# Patient Record
Sex: MALE | Race: WHITE
[De-identification: names, ages, dates, MRNs, and addresses within clinical notes are randomized per-mention and may not be internally consistent; named-entity substitution may affect disease eponyms.]

---

## 2017-09-10 ENCOUNTER — HOSPITAL ENCOUNTER (EMERGENCY)
Dept: HOSPITAL 45 - C.EDB | Age: 67
Discharge: HOME | End: 2017-09-10
Payer: COMMERCIAL

## 2017-09-10 VITALS
BODY MASS INDEX: 26.73 KG/M2 | HEIGHT: 70.98 IN | WEIGHT: 190.92 LBS | WEIGHT: 190.92 LBS | BODY MASS INDEX: 26.73 KG/M2 | HEIGHT: 70.98 IN

## 2017-09-10 VITALS — HEART RATE: 65 BPM | OXYGEN SATURATION: 97 % | SYSTOLIC BLOOD PRESSURE: 134 MMHG | DIASTOLIC BLOOD PRESSURE: 87 MMHG

## 2017-09-10 VITALS — TEMPERATURE: 97.88 F

## 2017-09-10 DIAGNOSIS — R10.9: ICD-10-CM

## 2017-09-10 DIAGNOSIS — Z79.82: ICD-10-CM

## 2017-09-10 DIAGNOSIS — N23: Primary | ICD-10-CM

## 2017-09-10 DIAGNOSIS — Z82.49: ICD-10-CM

## 2017-09-10 LAB
ALBUMIN/GLOB SERPL: 1.4 {RATIO} (ref 0.9–2)
ALP SERPL-CCNC: 75 U/L (ref 45–117)
ALT SERPL-CCNC: 28 U/L (ref 12–78)
ANION GAP SERPL CALC-SCNC: 11 MMOL/L (ref 3–11)
APPEARANCE UR: CLEAR
AST SERPL-CCNC: 20 U/L (ref 15–37)
BILIRUB UR-MCNC: (no result) MG/DL
BUN SERPL-MCNC: 15 MG/DL (ref 7–18)
BUN/CREAT SERPL: 13.3 (ref 10–20)
CALCIUM SERPL-MCNC: 9.8 MG/DL (ref 8.5–10.1)
CHLORIDE SERPL-SCNC: 105 MMOL/L (ref 98–107)
CO2 SERPL-SCNC: 23 MMOL/L (ref 21–32)
COLOR UR: YELLOW
CREAT CL PREDICTED SERPL C-G-VRATE: 67.5 ML/MIN
CREAT SERPL-MCNC: 1.1 MG/DL (ref 0.6–1.4)
EOSINOPHIL NFR BLD AUTO: 205 K/UL (ref 130–400)
GLOBULIN SER-MCNC: 3.1 GM/DL (ref 2.5–4)
GLUCOSE SERPL-MCNC: 161 MG/DL (ref 70–99)
HCT VFR BLD CALC: 45.2 % (ref 42–52)
MANUAL MICROSCOPIC REQUIRED?: NO
MCH RBC QN AUTO: 32.3 PG (ref 25–34)
MCHC RBC AUTO-ENTMCNC: 36.3 G/DL (ref 32–36)
MCV RBC AUTO: 89 FL (ref 80–100)
NITRITE UR QL STRIP: (no result)
PH UR STRIP: 7.5 [PH] (ref 4.5–7.5)
PMV BLD AUTO: 10.6 FL (ref 7.4–10.4)
POTASSIUM SERPL-SCNC: 3.9 MMOL/L (ref 3.5–5.1)
RBC # BLD AUTO: 5.08 M/UL (ref 4.7–6.1)
REVIEW REQ?: NO
SODIUM SERPL-SCNC: 139 MMOL/L (ref 136–145)
SP GR UR STRIP: 1.02 (ref 1–1.03)
URINE BILL WITH OR WITHOUT MIC: (no result)
URINE EPITHELIAL CELL AUTO: (no result) /LPF (ref 0–5)
UROBILINOGEN UR-MCNC: (no result) MG/DL
WBC # BLD AUTO: 14.5 K/UL (ref 4.8–10.8)
ZZUR CULT IF INDIC CLEAN CATCH: NO

## 2017-09-10 NOTE — DIAGNOSTIC IMAGING REPORT
ABD/PELVIS WITHOUT FOR STONE



CT DOSE: 966.87 mGycm



HISTORY: Flank pain  EVALUATE FLANK PAIN/HEMATURIA



TECHNIQUE: Multiaxial CT images of the abdomen and pelvis were performed without

the use of intravenous and oral contrast according to the standard department

stone protocol.  A dose lowering technique was utilized adhering to the

principles of ALARA.



COMPARISON STUDY: None.



FINDINGS: Lung bases are clear. Liver spleen and pancreas are unremarkable in

overall morphology. The adrenal glands are unremarkable.



Left kidney is negative for calcification or hydronephrosis.



There are findings of mild right renal hydronephrosis. There is a 6 x 4 mm

obstructing calculus at the right ureteropelvic junction. Distal aspects of the

ureters show no evidence for distention. Exophytic nodule from the mid right

kidney measuring 2.6 x 2.0 cm. Hounsfield units are indeterminate with this

potentially representing renal lesion versus hyperdense cyst.



The bowel pattern is nonobstructive. The appendix is normal. There are several

scattered colonic diverticuli with no evidence of diverticulitis. Prostate is

enlarged.



IMPRESSION:  



1. 6 x 4 mm obstructing calculus right ureteropelvic junction.

2. Mild/moderate right renal hydronephrosis.

3. Exophytic nodule mid right kidney measuring 2.6 x 2.0 cm. Renal ultrasound on

a routine basis is recommended to exclude the possibility of a renal mass versus

hyperdense cyst.

4. Scattered colonic diverticulosis 









The above report was generated using voice recognition software.  It may contain

grammatical, syntax or spelling errors.







Electronically signed by:  Derrick Gomez M.D.

9/10/2017 3:07 PM



Dictated Date/Time:  9/10/2017 3:02 PM

## 2017-09-10 NOTE — EMERGENCY ROOM VISIT NOTE
History


Report prepared by Deana:  Krystle Roberts


Under the Supervision of:  Dr. Tito Lomas M.D.


First contact with patient:  14:21


Chief Complaint:  FLANK PAIN


Stated Complaint:  EXTREME PAIN IN RIGHT SIDE, KIDNEY AREA, NAUSEA





History of Present Illness


The patient is a 67 year old male who presents to the Emergency Room with 

complaints of worsening right flank pain starting four hours ago. The patient 

states that the pain came on gradually. He states he noticed it when he went to 

[facility]. He reports that it worsened when at home watching the football game. He 

states that he couldn't stand the pain anymore so he decided to come into the 

hospital. The patient currently rates his pain as a 9/10 in severity. He notes 

that the pain stays in his back and does not radiate anywhere. He notes that he 

felt fine yesterday. The patient complains of nausea and feeling tingly. The 

patient denies pain in his testicles, burning with urination, frequent urination

, fever, and trauma. His wife notes that he is scheduled for a prostate MRI due 

to an increased PSA.





Patient arrived with previous records. They show a previous finding of a 6 mm 

stone within the right kidney.





   Source of History:  patient, spouse/significant other


   Onset:  four hours ago


   Position:  other (right flank)


   Symptom Intensity:  9/10


   Quality:  tingling


   Timing:  worsening


   Associated Symptoms:  + nausea, No fevers, No urinary symptoms


Note:


The patient denies pain in his testicles and trauma.





Review of Systems


See HPI for pertinent positives & negatives. A total of 10 systems reviewed and 

were otherwise negative.





Family History





FH: CHF (congestive heart failure)


  SISTER


FH: HTN (hypertension)


  BROTHER


  SISTER


No significant history


  FATHER


  MOTHER





Social History


Smoking Status:  Never Smoker


Marital Status:  


Housing Status:  lives with family


Occupation Status:  retired





Current/Historical Medications


Scheduled


Alfuzosin Hcl (Uroxatral), 10 MG PO DAILY


Aspirin (Aspirin Chewable), 81 MG PO DAILY


Atorvastatin (Atorvastatin Calcium), 10 MG PO DAILY


Levothyroxine Sodium (Levothyroxine Sodium), 125 MCG PO QAM


Multivitamins/Minerals (Mvi With Minerals), 1 TAB PO DAILY


Ondasetron Odt (Zofran Odt), 4 MG SL Q6H





Scheduled PRN


Oxycodone Ir (Roxicodone Ir), 1-2 TAB PO Q4H PRN for Pain





Allergies


Coded Allergies:  


     Tamsulosin (Unverified  Allergy, Unknown, ., 9/10/17)





Physical Exam


Vital Signs











  Date Time  Temp Pulse Resp B/P (MAP) Pulse Ox O2 Delivery O2 Flow Rate FiO2


 


9/10/17 16:14  65 18 134/87 97 Room Air  


 


9/10/17 14:38  51      


 


9/10/17 14:18 36.6 60 18 179/105 99 Room Air  











Physical Exam


GENERAL: Patient is in moderate distress secondary to pain.


HEENT: No acute trauma, normocephalic atraumatic, mucous membranes moist, no 

nasal congestion, no scleral icterus.


NECK: No stridor, no adenopathy, no meningismus, trachea is midline.


LUNGS: Clear to auscultation bilaterally, no wheeze, no rhonchi, breath sounds 

equal.


HEART: Without murmurs gallops or rubs, regular rate and rhythm.


ABDOMEN: Soft, nontender, bowel sounds positive, no hernias, no peritonitis.


BACK: Right flank discomfort with percussion.


EXTREMITIES: No cyanosis or edema, full range of motion of all the joints 

without pain or difficulty, no signs for acute trauma.


NEUROLOGIC: Oriented x 3, no acute motor or sensory deficits, no focal weakness.


SKIN: No rash, no jaundice, no diaphoresis.





Medical Decision & Procedures


ER Provider


Diagnostic Interpretation:


Radiology results as stated below per my review and radiologist interpretation:





ABD/PELVIS WITHOUT FOR STONE





CT DOSE: 966.87 mGycm





HISTORY: Flank pain  EVALUATE FLANK PAIN/HEMATURIA





TECHNIQUE: Multiaxial CT images of the abdomen and pelvis were performed without


the use of intravenous and oral contrast according to the standard department


stone protocol.  A dose lowering technique was utilized adhering to the


principles of ALARA.





COMPARISON STUDY: None.





FINDINGS: Lung bases are clear. Liver spleen and pancreas are unremarkable in


overall morphology. The adrenal glands are unremarkable.





Left kidney is negative for calcification or hydronephrosis.





There are findings of mild right renal hydronephrosis. There is a 6 x 4 mm


obstructing calculus at the right ureteropelvic junction. Distal aspects of the


ureters show no evidence for distention. Exophytic nodule from the mid right


kidney measuring 2.6 x 2.0 cm. Hounsfield units are indeterminate with this


potentially representing renal lesion versus hyperdense cyst.





The bowel pattern is nonobstructive. The appendix is normal. There are several


scattered colonic diverticuli with no evidence of diverticulitis. Prostate is


enlarged.





IMPRESSION:  





1. 6 x 4 mm obstructing calculus right ureteropelvic junction.


2. Mild/moderate right renal hydronephrosis.


3. Exophytic nodule mid right kidney measuring 2.6 x 2.0 cm. Renal ultrasound on


a routine basis is recommended to exclude the possibility of a renal mass versus


hyperdense cyst.


4. Scattered colonic diverticulosis 














The above report was generated using voice recognition software.  It may contain


grammatical, syntax or spelling errors.











Electronically signed by:  Derrick Gomez M.D.


9/10/2017 3:07 PM





Dictated Date/Time:  9/10/2017 3:02 PM





Laboratory Results


9/10/17 14:40








9/10/17 14:40

















Test


  9/10/17


14:40 9/10/17


15:34


 


Red Blood Count


  5.08 M/uL


(4.7-6.1) 


 


 


Mean Corpuscular Volume


  89.0 fL


() 


 


 


Mean Corpuscular Hemoglobin


  32.3 pg


(25-34) 


 


 


Mean Corpuscular Hemoglobin


Concent 36.3 g/dl


(32-36) 


 


 


RDW Standard Deviation


  40.8 fL


(36.4-46.3) 


 


 


RDW Coefficient of Variation


  12.7 %


(11.5-14.5) 


 


 


Mean Platelet Volume


  10.6 fL


(7.4-10.4) 


 


 


Anion Gap


  11.0 mmol/L


(3-11) 


 


 


Est Creatinine Clear Calc


Drug Dose 67.5 ml/min 


  


 


 


Estimated GFR (


American) 79.0 


  


 


 


Estimated GFR (Non-


American 68.1 


  


 


 


BUN/Creatinine Ratio 13.3 (10-20)  


 


Calcium Level


  9.8 mg/dl


(8.5-10.1) 


 


 


Total Bilirubin


  0.8 mg/dl


(0.2-1) 


 


 


Aspartate Amino Transf


(AST/SGOT) 20 U/L (15-37) 


  


 


 


Alanine Aminotransferase


(ALT/SGPT) 28 U/L (12-78) 


  


 


 


Alkaline Phosphatase


  75 U/L


() 


 


 


Total Protein


  7.3 gm/dl


(6.4-8.2) 


 


 


Albumin


  4.2 gm/dl


(3.4-5.0) 


 


 


Globulin


  3.1 gm/dl


(2.5-4.0) 


 


 


Albumin/Globulin Ratio 1.4 (0.9-2)  


 


Lipase


  218 U/L


() 


 


 


Urine Color  YELLOW 


 


Urine Appearance  CLEAR (CLEAR) 


 


Urine pH  7.5 (4.5-7.5) 


 


Urine Specific Gravity


  


  1.018


(1.000-1.030)


 


Urine Protein  NEG (NEG) 


 


Urine Glucose (UA)  NEG (NEG) 


 


Urine Ketones  1+ (NEG) 


 


Urine Occult Blood  2+ (NEG) 


 


Urine Nitrite  NEG (NEG) 


 


Urine Bilirubin  NEG (NEG) 


 


Urine Urobilinogen  NEG (NEG) 


 


Urine Leukocyte Esterase  NEG (NEG) 


 


Urine WBC (Auto)  1-5 /hpf (0-5) 


 


Urine RBC (Auto)  >30 /hpf (0-4) 


 


Urine Hyaline Casts (Auto)  0 /lpf (0-5) 


 


Urine Epithelial Cells (Auto)


  


  20-30 /lpf


(0-5)


 


Urine Bacteria (Auto)  NEG (NEG) 





Laboratory results reviewed by me.





Medications Administered











 Medications


  (Trade)  Dose


 Ordered  Sig/Mera


 Route  Start Time


 Stop Time Status Last Admin


Dose Admin


 


 Ondansetron HCl


  (Zofran Inj)  4 mg  NOW  STAT


 IV  9/10/17 14:25


 9/10/17 14:27 DC 9/10/17 14:41


4 MG


 


 Sodium Chloride  1,000 ml @ 


 999 mls/hr  Q1H1M STAT


 IV  9/10/17 14:25


 9/10/17 15:25 DC 9/10/17 14:41


999 MLS/HR


 


 Morphine Sulfate


  (MoRPHine


 SULFATE INJ)  4 mg  Q15M  PRN


 IV  9/10/17 14:30


 9/24/17 14:29  9/10/17 14:42


4 MG


 


 Ketorolac


 Tromethamine


  (Toradol Inj)  30 mg  NOW  STAT


 IV  9/10/17 14:25


 9/10/17 14:27 DC 9/10/17 14:41


30 MG


 


 Alfuzosin HCl


  (Uroxatral Tab)  10 mg  NOW  STAT


 PO  9/10/17 15:53


 9/10/17 15:54 DC 9/10/17 16:12


10 MG


 


 Oxycodone HCl


  (Roxicodone


 Immediate Rel 5MG


 Home Pack)  1 homepack  UD  ONCE


 PO  9/10/17 16:45


 9/10/17 16:46 DC 9/10/17 16:49


1 HOMEPACK


 


 Ondansetron HCl


  (ZOFRAN ODT 4MG


 Home Pack)  1 homepack  UD  ONCE


 PO  9/10/17 16:45


 9/10/17 16:46 DC 9/10/17 16:49


1 HOMEPACK











ED Course


1422: The patient was evaluated in room B11A. A complete history and physical 

exam was performed.





1425: Ordered Toradol Inj 30 mg IV, NSS 1000 ml @ 999 mls/hr IV, Zofran Inj 4 

mg IV.





1430: Ordered Morphine Sulfate 4 mg PRN IV pain.





1553: Ordered Alfuzosin HCl 10 mg PO.





1554: I reevaluated the patient and he is doing okay.





1618: Reevaluated the patient. Discussed results and discharge instructions: he 

verbalized understanding and agreement. The patient is ready for discharge.





1645: Ordered Ondansetron HCl 1 homepack PO, Oxycodone HCl 1 homepack PO.





Medical Decision


Differential diagnoses include renal colic, UTI, renal hematoma, dehydration, 

electrolyte imbalance, renal failure.





There is a mild leukocytosis, likely consistent with his pain.  No anemia.  No 

significant electrolyte abnormality, kidney failure or hepatitis.  Abdominal 

and pelvis CT shows a 6 mm right ureteral stone with hydronephrosis.  A nodule 

was seen on the right kidney for which follow-up was suggested.  Urinalysis 

shows hematuria, no infection.





The patient received IV saline, IV Toradol, IV morphine and IV Zofran, he is 

more comfortable.  He received oral Uroxatral.





The patient is stable, he is being discharged on Motrin, oxycodone, Uroxatral, 

Zofran.  He will strain all his urine for the stone.  He will return here for 

fever, vomiting or uncontrolled pain.  He will talk with his urologist tomorrow 

about follow-up.





[place] Drug Monitoring Program


Search Results:  no issues identified





Medication Reconcilliation


Current Medication List:  was personally reviewed by me





Blood Pressure Screening


Patient's blood pressure:  Elevated blood pressure


Blood pressure disposition:  Elevated BP felt to be situational





Impression





 Primary Impression:  


 Renal colic


 Additional Impression:  


 Right flank pain





Scribe Attestation


The scribe's documentation has been prepared under my direction and personally 

reviewed by me in its entirety. I confirm that the note above accurately 

reflects all work, treatment, procedures, and medical decision making performed 

by me.





Departure Information


Dispostion


Home / Self-Care





Prescriptions





Alfuzosin Hcl (Uroxatral) 10 Mg Tab


10 MG PO DAILY, #10 TAB


   Prov: Tito Lomas M.D.         9/10/17 


Ondasetron Odt (ZOFRAN ODT) 4 Mg Tab


4 MG SL Q6H for Nausea, #10 TAB


   Prov: Tito Lomas M.D.         9/10/17 


Oxycodone Ir (Roxicodone Ir) 5 Mg Tab


1-2 TAB PO Q4H Y for Pain, #10 TAB


   Prov: Tito Lomas M.D.         9/10/17





Referrals


No Doctor, Assigned (PCP)





Forms


HOME CARE DOCUMENTATION FORM,                                                 

               IMPORTANT VISIT INFORMATION





Patient Instructions


My [facility]





Additional Instructions





strain all the urine for the stone


stay hydrated


motrin for pain


oxy ir for severe pain, 1-2 tab as needed every 4 hours


stool softners to prevent constipation


Uroxatral to try and help pass the stone


zofran 1 tab every 6 hours for nausea


call and set up appt with your urologist tomorrow





return for worsening symptoms, fever, vomiting or uncontrolled pain





Problem Qualifiers

## 2017-09-12 ENCOUNTER — HOSPITAL ENCOUNTER (INPATIENT)
Dept: HOSPITAL 45 - C.EDB | Age: 67
LOS: 1 days | Discharge: HOME | DRG: 694 | End: 2017-09-13
Attending: FAMILY MEDICINE | Admitting: FAMILY MEDICINE
Payer: COMMERCIAL

## 2017-09-12 VITALS
HEIGHT: 70 IN | WEIGHT: 193.35 LBS | HEIGHT: 70 IN | BODY MASS INDEX: 27.68 KG/M2 | BODY MASS INDEX: 27.68 KG/M2 | WEIGHT: 193.35 LBS

## 2017-09-12 DIAGNOSIS — N40.0: ICD-10-CM

## 2017-09-12 DIAGNOSIS — K59.03: ICD-10-CM

## 2017-09-12 DIAGNOSIS — N20.1: Primary | ICD-10-CM

## 2017-09-12 DIAGNOSIS — Z82.49: ICD-10-CM

## 2017-09-12 DIAGNOSIS — I10: ICD-10-CM

## 2017-09-12 DIAGNOSIS — N23: ICD-10-CM

## 2017-09-12 DIAGNOSIS — Z79.82: ICD-10-CM

## 2017-09-12 DIAGNOSIS — Z96.653: ICD-10-CM

## 2017-09-12 DIAGNOSIS — E03.9: ICD-10-CM

## 2017-09-12 DIAGNOSIS — I73.9: ICD-10-CM

## 2017-09-12 DIAGNOSIS — E78.5: ICD-10-CM

## 2017-09-12 LAB
ALP SERPL-CCNC: 67 U/L (ref 45–117)
ALT SERPL-CCNC: 20 U/L (ref 12–78)
ANION GAP SERPL CALC-SCNC: 7 MMOL/L (ref 3–11)
APPEARANCE UR: (no result)
AST SERPL-CCNC: 15 U/L (ref 15–37)
BASOPHILS # BLD: 0.01 K/UL (ref 0–0.2)
BASOPHILS NFR BLD: 0.1 %
BILIRUB UR-MCNC: (no result) MG/DL
BUN SERPL-MCNC: 17 MG/DL (ref 7–18)
BUN/CREAT SERPL: 13.3 (ref 10–20)
CALCIUM SERPL-MCNC: 8.6 MG/DL (ref 8.5–10.1)
CHLORIDE SERPL-SCNC: 103 MMOL/L (ref 98–107)
CO2 SERPL-SCNC: 27 MMOL/L (ref 21–32)
COLOR UR: YELLOW
COMPLETE: YES
CREAT CL PREDICTED SERPL C-G-VRATE: 59.8 ML/MIN
CREAT SERPL-MCNC: 1.3 MG/DL (ref 0.6–1.4)
EOSINOPHIL NFR BLD AUTO: 176 K/UL (ref 130–400)
GLUCOSE SERPL-MCNC: 106 MG/DL (ref 70–99)
HCT VFR BLD CALC: 43.2 % (ref 42–52)
IG%: 0.6 %
IMM GRANULOCYTES NFR BLD AUTO: 8.2 %
LYMPHOCYTES # BLD: 1.01 K/UL (ref 1.2–3.4)
MANUAL MICROSCOPIC REQUIRED?: NO
MCH RBC QN AUTO: 31.3 PG (ref 25–34)
MCHC RBC AUTO-ENTMCNC: 34 G/DL (ref 32–36)
MCV RBC AUTO: 91.9 FL (ref 80–100)
MONOCYTES NFR BLD: 8.9 %
NEUTROPHILS # BLD AUTO: 0.1 %
NEUTROPHILS NFR BLD AUTO: 82.1 %
NITRITE UR QL STRIP: (no result)
PH UR STRIP: 5 [PH] (ref 4.5–7.5)
PMV BLD AUTO: 10.6 FL (ref 7.4–10.4)
POTASSIUM SERPL-SCNC: 4.1 MMOL/L (ref 3.5–5.1)
RBC # BLD AUTO: 4.7 M/UL (ref 4.7–6.1)
REVIEW REQ?: NO
SODIUM SERPL-SCNC: 137 MMOL/L (ref 136–145)
SP GR UR STRIP: 1.02 (ref 1–1.03)
URINE BILL WITH OR WITHOUT MIC: (no result)
URINE EPITHELIAL CELL AUTO: (no result) /LPF (ref 0–5)
UROBILINOGEN UR-MCNC: (no result) MG/DL
WBC # BLD AUTO: 12.31 K/UL (ref 4.8–10.8)

## 2017-09-12 NOTE — DIAGNOSTIC IMAGING REPORT
KUB



CLINICAL HISTORY: Right flank pain. History of kidney stones.    



COMPARISON STUDY:  CT of the abdomen and pelvis September 10, 2017. 



FINDINGS: Pelvic calcifications represent phleboliths, as shown on prior

abdominal CT. A 6 mm right ureteropelvic junction calculus is unchanged in

position since CT of September 10, 2017. No additional urinary calculi are

identified. There is no evidence for a bowel obstruction.



IMPRESSION:  No change in position of the 6 mm right ureteropelvic junction

calculus. 







Electronically signed by:  Akshat Rose M.D.

9/12/2017 8:25 PM



Dictated Date/Time:  9/12/2017 8:22 PM

## 2017-09-12 NOTE — EMERGENCY ROOM VISIT NOTE
History


Report prepared by Deana:  Krystle Roberts


Under the Supervision of:  Dr. Lauri Arroyo M.D.


First contact with patient:  19:27


Chief Complaint:  KIDNEY STONE


Stated Complaint:  PAIN FROM KIDNEY STONE





History of Present Illness


The patient is a 67 year old male who presents to the Emergency Room with 

complaints of a worsening right kidney stone starting a few days ago. The 

patient states that he was here two days ago and they found a 6 mm kidney 

stone. He states he has an appointment tomorrow with urology, but reports that 

he is struggling with pain control. The patient states that he cannot get 

comfortable and the Oxycodone hasn't been helping. He complains of being 

constipated from the Oxycodone. The patient currently rates his pain as a 7/10 

in severity. The patient denies taking Ibuprofen.





   Source of History:  patient


   Onset:  a few days ago


   Position:  other (right flank)


   Symptom Intensity:  7/10


   Timing:  worsening


Note:


The patient complains of constipation. The patient denies taking Ibuprofen.





Review of Systems


See HPI for pertinent positives & negatives. A total of 10 systems reviewed and 

were otherwise negative.





Past Medical & Surgical


Medical Problems:


(1) HTN (hypertension)


(2) Renal colic


Surgical Problems:


(1) History of total bilateral knee replacement








Family History





FH: CHF (congestive heart failure)


  SISTER


FH: HTN (hypertension)


  BROTHER


  SISTER


No significant history


  FATHER


  MOTHER





Social History


Smoking Status:  Never Smoker


Marital Status:  


Housing Status:  lives with family


Occupation Status:  retired





Current/Historical Medications


Scheduled


Alfuzosin Hcl (Uroxatral), 10 MG PO DAILY


Aspirin (Aspirin Chewable), 81 MG PO DAILY


Atorvastatin (Atorvastatin Calcium), 10 MG PO DAILY


Docusate Sodium (Stool Softener), 100 MG PO AS DIRECTED


Levothyroxine Sodium (Levothyroxine Sodium), 125 MCG PO QAM


Multivitamins/Minerals (Mvi With Minerals), 1 TAB PO DAILY


Ondasetron Odt (Zofran Odt), 4 MG SL Q6H





Scheduled PRN


Oxycodone Ir (Roxicodone Ir), 1-2 TAB PO Q4H PRN for Pain





Allergies


Coded Allergies:  


     Tamsulosin (Unverified  Allergy, Unknown, ., 9/12/17)





Physical Exam


Vital Signs











  Date Time  Temp Pulse Resp B/P (MAP) Pulse Ox O2 Delivery O2 Flow Rate FiO2


 


9/12/17 21:21  89      


 


9/12/17 20:17  92 16 151/94 96 Room Air  


 


9/12/17 18:51 37.5 92 20 159/91 96 Room Air  











Physical Exam


GENERAL: Patient is a healthy-appearing well-nourished 


HEAD: Normocephalic atraumatic


EYES: Ocular movements intact pupils equal and react to light


OROPHARYNX mucous membranes are moist no exudates present no erythema or edema 

present


NECK: Supple no nuchal rigidity


CHEST: Good equal expansion


LUNGS: Clear and equal to auscultation


CARDIAC: Normal S1 and S2


ABDOMEN: Soft nontender no guarding


BACK: No CVA tenderness


EXTREMITIES: No pain upon palpation normal muscle strength in all groups no 

clubbing cyanosis or edema


NEURO: Patient is following commands and answering questions appropriately. 

Alert and oriented x3 Cranial Nerves 2-12 grossly intact





Medical Decision & Procedures


ER Provider


Diagnostic Interpretation:


Radiology results as stated below per my review and radiologist interpretation:





KUB





CLINICAL HISTORY: Right flank pain. History of kidney stones.    





COMPARISON STUDY:  CT of the abdomen and pelvis September 10, 2017. 





FINDINGS: Pelvic calcifications represent phleboliths, as shown on prior


abdominal CT. A 6 mm right ureteropelvic junction calculus is unchanged in


position since CT of September 10, 2017. No additional urinary calculi are


identified. There is no evidence for a bowel obstruction.





IMPRESSION:  No change in position of the 6 mm right ureteropelvic junction


calculus. 











Electronically signed by:  Akshat Rose M.D.


9/12/2017 8:25 PM





Dictated Date/Time:  9/12/2017 8:22 PM








RENAL ULTRASOUND





CLINICAL HISTORY: Right flank pain.    





COMPARISON STUDY:  CT of the abdomen and pelvis September 10, 2017 and KUB


performed earlier today.





TECHNIQUE:  Sonography of the kidneys and the urinary bladder was performed.





FINDINGS: The right kidney measures 12.9 x 5.9 x 6.3 cm and the left measures


10.7 x 6.2 x 4.7 cm. There is a 2.8 cm right renal cyst. Mild right


hydronephrosis is similar to CT of September 10, 2017. The right ureteropelvic


junction calculus shown on CT and KUB is not visualized on this exam but may be


occult by sonography. There is no left hydronephrosis. The right ureteral jet


was not identified. The prostate is moderately enlarged.





IMPRESSION: 





1. Mild right hydronephrosis which is similar to CT of September 10, 2017. Right


ureteropelvic junction calculus shown on CT and KUB is not visualized on this


exam but is likely occult by sonography.





2. No left hydronephrosis.





3. Nonvisualization of the right ureteral jet.











Electronically signed by:  Akshat Rose M.D.


9/12/2017 9:19 PM





Dictated Date/Time:  9/12/2017 9:15 PM





Laboratory Results


9/12/17 19:45








Red Blood Count 4.70, Mean Corpuscular Volume 91.9, Mean Corpuscular Hemoglobin 

31.3, Mean Corpuscular Hemoglobin Concent 34.0, Mean Platelet Volume 10.6, 

Neutrophils (%) (Auto) 82.1, Lymphocytes (%) (Auto) 8.2, Monocytes (%) (Auto) 

8.9, Eosinophils (%) (Auto) 0.1, Basophils (%) (Auto) 0.1, Neutrophils # (Auto) 

10.10, Lymphocytes # (Auto) 1.01, Monocytes # (Auto) 1.10, Eosinophils # (Auto) 

0.01, Basophils # (Auto) 0.01





9/12/17 19:45

















Test


  9/12/17


19:45


 


White Blood Count


  12.31 K/uL


(4.8-10.8)


 


Red Blood Count


  4.70 M/uL


(4.7-6.1)


 


Hemoglobin


  14.7 g/dL


(14.0-18.0)


 


Hematocrit 43.2 % (42-52) 


 


Mean Corpuscular Volume


  91.9 fL


()


 


Mean Corpuscular Hemoglobin


  31.3 pg


(25-34)


 


Mean Corpuscular Hemoglobin


Concent 34.0 g/dl


(32-36)


 


Platelet Count


  176 K/uL


(130-400)


 


Mean Platelet Volume


  10.6 fL


(7.4-10.4)


 


Neutrophils (%) (Auto) 82.1 % 


 


Lymphocytes (%) (Auto) 8.2 % 


 


Monocytes (%) (Auto) 8.9 % 


 


Eosinophils (%) (Auto) 0.1 % 


 


Basophils (%) (Auto) 0.1 % 


 


Neutrophils # (Auto)


  10.10 K/uL


(1.4-6.5)


 


Lymphocytes # (Auto)


  1.01 K/uL


(1.2-3.4)


 


Monocytes # (Auto)


  1.10 K/uL


(0.11-0.59)


 


Eosinophils # (Auto)


  0.01 K/uL


(0-0.5)


 


Basophils # (Auto)


  0.01 K/uL


(0-0.2)


 


RDW Standard Deviation


  43.1 fL


(36.4-46.3)


 


RDW Coefficient of Variation


  12.8 %


(11.5-14.5)


 


Immature Granulocyte % (Auto) 0.6 % 


 


Immature Granulocyte # (Auto)


  0.08 K/uL


(0.00-0.02)


 


Urine Color YELLOW 


 


Urine Appearance CLOUDY (CLEAR) 


 


Urine pH 5.0 (4.5-7.5) 


 


Urine Specific Gravity


  1.019


(1.000-1.030)


 


Urine Protein 1+ (NEG) 


 


Urine Glucose (UA) NEG (NEG) 


 


Urine Ketones TRACE (NEG) 


 


Urine Occult Blood 3+ (NEG) 


 


Urine Nitrite NEG (NEG) 


 


Urine Bilirubin NEG (NEG) 


 


Urine Urobilinogen NEG (NEG) 


 


Urine Leukocyte Esterase NEG (NEG) 


 


Urine WBC (Auto) 1-5 /hpf (0-5) 


 


Urine RBC (Auto)


  10-30 /hpf


(0-4)


 


Urine Hyaline Casts (Auto) 1-5 /lpf (0-5) 


 


Urine Epithelial Cells (Auto) 0-5 /lpf (0-5) 


 


Urine Bacteria (Auto) NEG (NEG) 


 


Anion Gap


  7.0 mmol/L


(3-11)


 


Est Creatinine Clear Calc


Drug Dose 59.8 ml/min 


 


 


Estimated GFR (


American) 64.5 


 


 


Estimated GFR (Non-


American 55.7 


 


 


BUN/Creatinine Ratio 13.3 (10-20) 


 


Calcium Level


  8.6 mg/dl


(8.5-10.1)


 


Total Bilirubin


  1.1 mg/dl


(0.2-1)


 


Direct Bilirubin


  0.2 mg/dl


(0-0.2)


 


Aspartate Amino Transf


(AST/SGOT) 15 U/L (15-37) 


 


 


Alanine Aminotransferase


(ALT/SGPT) 20 U/L (12-78) 


 


 


Alkaline Phosphatase


  67 U/L


()


 


Total Protein


  6.9 gm/dl


(6.4-8.2)


 


Albumin


  3.6 gm/dl


(3.4-5.0)


 


Lipase


  151 U/L


()





Labs reviewed by ED physician.





Medications Administered











 Medications


  (Trade)  Dose


 Ordered  Sig/Mera


 Route  Start Time


 Stop Time Status Last Admin


Dose Admin


 


 Ketorolac


 Tromethamine


  (Toradol Inj)  30 mg  NOW  STAT


 IV  9/12/17 19:43


 9/12/17 19:47 DC 9/12/17 20:24


30 MG


 


 Hydromorphone HCl


  (Dilaudid Inj)  1 mg  NOW  STAT


 IV  9/12/17 19:43


 9/12/17 19:47 DC 9/12/17 20:24


1 MG


 


 Metoclopramide HCl


  (Reglan Inj)  10 mg  NOW  STAT


 IV  9/12/17 19:43


 9/12/17 19:47 DC 9/12/17 20:24


10 MG


 


 Sodium Chloride  1,000 ml @ 


 999 mls/hr  Q1H1M STAT


 IV  9/12/17 19:43


 9/12/17 20:43 DC 9/12/17 20:18


999 MLS/HR


 


 Ceftriaxone Sodium


  (Rocephin Inj)  1 gm  NOW  STAT


 IV  9/12/17 20:33


 9/12/17 20:34 DC 9/12/17 21:09


1 GM


 


 Sodium Chloride  1,000 ml @ 


 80 mls/hr  [id number]


 IV  9/12/17 21:30


 10/12/17 21:29  9/13/17 00:18


80 MLS/HR











ECG


Indication:  abdominal pain


Rate (beats per minute):  90


Rhythm:  normal sinus


Findings:  no acute ischemic change, no ectopy





ED Course


1938: Past medical records reviewed. The patient was evaluated in room B4B. A 

complete history and physical examination was performed. 





1943: Ordered NSS 1000 ml @ 999 mls/hr IV, Reglan Inj 10 mg IV, Dilaudid Inj 1 

mg IV, Toradol Inj 30 mg IV.





2033: Ordered Vancomycin HCl 1 gm IV, Rocephin Inj 1 gm IV.





2103: I discussed the patient's case with Dr. Calderon, he has agreed to evaluate 

the patient for further management and care.





Medical Decision


Etiologies such as appendicitis, diverticulitis, PUD, biliary pathology, UTI, 

pancreatitis, obstruction, mesenteric ischemia, aortic pathology, infections, 

inflammatory bowel disease, renal colic, as well as others were entertained. 





This is a 67-year-old male who presents emergency department complaining of 

flank pain.  The patient was just diagnosed with a CAT scan as a kidney stone.  

It is an exceptionally large kidney stone at 6 mm x 4 mm.  Based on this 

finding I sent the patient for KUB as well as an ultrasound.  The kidney stone 

does not appear to have moved and that time.  The patient cannot get his pain 

under control at home therefore an IV was established here, the patient is 

given Toradol as well as Compazine.  He is running a slight fever and has an 

elevation in his white blood count cell count.  For this reason the patient was 

started on Rocephin as well as vancomycin.  I did discuss the case with the 

hospitalist service who agreed to admit the patient.  Patient was in agreement 

with treatment plan.





Medication Reconcilliation


Current Medication List:  was personally reviewed by me





Blood Pressure Screening


Patient's blood pressure:  Elevated blood pressure


Will be further monitored by hospitalist.





Consults


Time Called:  2101


Consulting Physician:  Dr. Kam Yadav


Returned Call:  2103


I discussed the patient's case with Dr. Calderon, he has agreed to evaluate the 

patient for further management and care.





Impression





 Primary Impression:  


 Kidney stone





Scribe Attestation


The scribe's documentation has been prepared under my direction and personally 

reviewed by me in its entirety. I confirm that the note above accurately 

reflects all work, treatment, procedures, and medical decision making performed 

by me.





Departure Information


Dispostion


Being Evaluated By Hospitalist





Referrals


Dev Post M.D. (PCP)





Patient Instructions


My [facility]

## 2017-09-12 NOTE — DIAGNOSTIC IMAGING REPORT
RENAL ULTRASOUND



CLINICAL HISTORY: Right flank pain.    



COMPARISON STUDY:  CT of the abdomen and pelvis September 10, 2017 and KUB

performed earlier today.



TECHNIQUE:  Sonography of the kidneys and the urinary bladder was performed.



FINDINGS: The right kidney measures 12.9 x 5.9 x 6.3 cm and the left measures

10.7 x 6.2 x 4.7 cm. There is a 2.8 cm right renal cyst. Mild right

hydronephrosis is similar to CT of September 10, 2017. The right ureteropelvic

junction calculus shown on CT and KUB is not visualized on this exam but may be

occult by sonography. There is no left hydronephrosis. The right ureteral jet

was not identified. The prostate is moderately enlarged.



IMPRESSION: 



1. Mild right hydronephrosis which is similar to CT of September 10, 2017. Right

ureteropelvic junction calculus shown on CT and KUB is not visualized on this

exam but is likely occult by sonography.



2. No left hydronephrosis.



3. Nonvisualization of the right ureteral jet.







Electronically signed by:  Akshat Rose M.D.

9/12/2017 9:19 PM



Dictated Date/Time:  9/12/2017 9:15 PM

## 2017-09-12 NOTE — HISTORY & PHYSICAL EXAMINATION
DATE OF ADMISSION:  09/12/2017

 

PRIMARY CARE DOCTOR:  Dr. Post.

 

CHIEF COMPLAINT:  Right flank/kidney stone pain.

 

HISTORY OF PRESENT ILLNESS: 



History obtained from patient and records. 



Medical history significant for BPH, urolithiasis, hypertension, PVD, arthritis,

hypothyroidism.  



Recent confinement under orthopedic service 2013 for right knee surgery.  



As per the patient, in 05/2017, the patient was found to have a

nonobstructing right kidney stone on CT scan done at [facility]

in preparation for kidney donor candidacy, no symptoms.  

He was declined as a kidney transplant donor due to incidental finding of 

50-60% stenosis of the renal arteries. 



Last few days, the patient noted right flank pain, achy, some nausea, no 
vomiting.

Seen at the Emergency Room a few days ago.  

CAT scan of the abdomen and pelvis showed 6 x

4 mm obstructing calculus right UPJ with mild to moderate right

hydronephrosis, exophytic nodule mid right kidney.  

Patient discharged on pain medication.

Told to follow up w/ outpatient urology.  

No improvement of symptoms.  

Patient constipated last few days, some chills, no hematuria. 



Patient returned to the Emergency Room with intractable pain.

 

MEDICAL HISTORY:  As above.

 

SURGERIES:  He has had orthopedic procedures, prostate procedures.

 

HOME MEDICATIONS:  Include aspirin, atorvastatin, stool softener,

levothyroxine, multivitamins, Uroxatral.

 

ALLERGIES:  TAMSULOSIN.

 

FAMILY HISTORY:  Kidney disease, heart disease.

 

PERSONAL AND SOCIAL HISTORY:  Nonsmoker, no chronic intake of alcoholic

beverages.  Retired educator.  

 

REVIEW OF SYSTEMS:  As per HPI, all other ROS negative.

 

PHYSICAL EXAMINATION:

VITAL SIGNS:  Blood pressure was noted to be 150/93, pulse 72, RR 16,

temperature 36.5, sats 96 on room air.

GENERAL:  Noted to be slightly uncomfortable.  No respiratory distress. 

Looks younger for stated age.

SKIN:  Normal color.

HEENT:  Pink palpebral conjunctivae.  Dry mucosa.

NECK:  No JVD.  Supple.

CHEST:  Clear to auscultation.

HEART:  Regular rate and rhythm.

ABDOMEN:  Soft.

BACK:  No flank tenderness.

EXTREMITIES:  No edema, no tenderness.

NEUROLOGIC:  No gross focality.

 

LABORATORY DATA:  Hemoglobin 14.7, hematocrit 42.2, white cells 12.31,

platelets 176.  Sodium 137, potassium 4.1, chloride 103, CO2 of 27, BUN 17,

creatinine 0.3, glucose 106.

 

KUB x-ray showed no change in position of 6 mm right UPJ calculus.  Renal

ultrasound, mild right hydronephrosis, 2.8 mm right renal cyst.

 

ASSESSMENT:

1.  Right renal colic.

No sepsis.  

Failed outpatient treatment.  

Intractable symptoms.

2.  Narcotic-induced constipation.

3.  Hypertension, slightly elevated.

4.  History of BPH as per records

 

PLAN:  

GMF

Analgesia.  IV fluids.  Hold off on antibiotics for now as patient not septic.

Urology consult.  RE renal colic (Patient known to Dr. Cadena.)

Bowel regimen.  

DVT prophylaxis, SCDs.  

Full code.

 

 

 

MTDD

## 2017-09-13 VITALS
OXYGEN SATURATION: 95 % | SYSTOLIC BLOOD PRESSURE: 160 MMHG | HEART RATE: 72 BPM | TEMPERATURE: 98.42 F | DIASTOLIC BLOOD PRESSURE: 71 MMHG

## 2017-09-13 VITALS
OXYGEN SATURATION: 93 % | TEMPERATURE: 98.24 F | SYSTOLIC BLOOD PRESSURE: 124 MMHG | DIASTOLIC BLOOD PRESSURE: 81 MMHG | HEART RATE: 66 BPM

## 2017-09-13 VITALS — OXYGEN SATURATION: 95 %

## 2017-09-13 VITALS
SYSTOLIC BLOOD PRESSURE: 130 MMHG | HEART RATE: 66 BPM | OXYGEN SATURATION: 95 % | TEMPERATURE: 99.32 F | DIASTOLIC BLOOD PRESSURE: 80 MMHG

## 2017-09-13 VITALS
DIASTOLIC BLOOD PRESSURE: 101 MMHG | TEMPERATURE: 98.6 F | OXYGEN SATURATION: 98 % | HEART RATE: 73 BPM | SYSTOLIC BLOOD PRESSURE: 156 MMHG

## 2017-09-13 VITALS
TEMPERATURE: 98.42 F | HEART RATE: 68 BPM | SYSTOLIC BLOOD PRESSURE: 134 MMHG | OXYGEN SATURATION: 95 % | DIASTOLIC BLOOD PRESSURE: 82 MMHG

## 2017-09-13 VITALS — HEART RATE: 68 BPM | SYSTOLIC BLOOD PRESSURE: 115 MMHG | DIASTOLIC BLOOD PRESSURE: 73 MMHG | TEMPERATURE: 98.6 F

## 2017-09-13 VITALS
DIASTOLIC BLOOD PRESSURE: 70 MMHG | TEMPERATURE: 98.6 F | SYSTOLIC BLOOD PRESSURE: 128 MMHG | HEART RATE: 73 BPM | OXYGEN SATURATION: 98 %

## 2017-09-13 PROCEDURE — 0T768DZ DILATION OF RIGHT URETER WITH INTRALUMINAL DEVICE, VIA NATURAL OR ARTIFICIAL OPENING ENDOSCOPIC: ICD-10-PCS | Performed by: UROLOGY

## 2017-09-13 RX ADMIN — SODIUM CHLORIDE SCH MLS/HR: 900 INJECTION, SOLUTION INTRAVENOUS at 13:07

## 2017-09-13 RX ADMIN — SODIUM CHLORIDE SCH MLS/HR: 900 INJECTION, SOLUTION INTRAVENOUS at 00:18

## 2017-09-13 NOTE — [facility] OPERATIVE REPORT
Operative Report


Operative Date


Sep 13, 2017.





Pre-Operative Diagnosis





Right Uretero-pelvic Junction Nephrolithiasis





Post-Operative Diagnosis





Right Ureteropelvic Junction Nephrolithiasis





Procedure(s) Performed





Cystoscopy, Stent placement right





Surgeon


Dr. Angelic Cadena





Assistant Surgeon(s)


none





Estimated Blood Loss


10ML





Findings


radio-opaque right upper ureteral stone, tight UVJ, large vascular prostate





Fluids


500mL





Specimens





none per surgeon





Drains


6 fr 24 centimeter double J stent





Anesthesia


LMA





Complication(s)


None





Disposition


Recovery Room / PACU





Indications


6mm right upj stone failed outpatient management.





Description of Procedure


Patient was given general LMA anesthesia and placed in lithotomy position.  His 

genitals were prepped and draped in sterile fashion.  Time out held with team.  

I placed a 21 fr rigid cystoscope to bladder.  The urethra is unremarkable.  

The prostate is large long occlusive and elevates the bladder neck.  It bleeds 

upon scope insertion.  The UOs are medially displaced.  I placed a road runner 

wire up right ureter to kidney.  Wire went passed radio-opaque stone easily.  I 

passed 5 fr open ended and changed wire to stiff wire.  I passed a dual lumen 

catheter to try to calibrate the UVJ.  The dual lumen wont pass the UVJ.  I 

next tried to pass the flexible ureteroscope over the second wire and it wont 

pass the UVJ.  I then removed scope.  I placed a 6 fr 24 centimeter double J 

stent easily.  I replaced the rigid cystoscope and removed a few mLs of clot 

and observed the stent.  It is briskly effluxing urine with some debris from 

the right kidney.  I left bladder empty and concluded case.  I placed a 

belladonna and opium suppository for post-op pain.    


He transferred to recovery under my escort, in stable condition.


Plan:


Home today


Pyridium for dysuria x 3 days


flomax daily


oral pain meds as needed


back to OR next week for repeat attempt at stone treatment. 


   





ASA 2


clean contaminated case


46 seconds fluoro


ancef antibiotic on call


I attest to the content of the Intraoperative Record and any orders documented 

therein.  Any exceptions are noted below.

## 2017-09-13 NOTE — ANESTHESIOLOGY PROGRESS NOTE
Anesthesia Post Op Note


Date & Time


Sep 13, 2017 at 18:27





Vital Signs


Pain Intensity:  0





Vital Signs Past 12 Hours








  Date Time  Temp Pulse Resp B/P (MAP) Pulse Ox O2 Delivery O2 Flow Rate FiO2


 


9/13/17 18:17 37.0       


 


9/13/17 18:16    128/70    


 


9/13/17 18:13  71 16  98   


 


9/13/17 18:13  73 16     


 


9/13/17 18:11    139/98    


 


9/13/17 18:08  73 14     


 


9/13/17 18:08  72 14  100   


 


9/13/17 18:07  71 19     


 


9/13/17 18:07  71 19  98   


 


9/13/17 18:06    143/91    


 


9/13/17 18:02  74 16     


 


9/13/17 18:02  73 16  97   


 


9/13/17 18:01    146/90    


 


9/13/17 17:57  73 17     


 


9/13/17 17:57  73 17  98   


 


9/13/17 17:56    148/90    


 


9/13/17 17:52  72 16  100   


 


9/13/17 17:52  71 16     


 


9/13/17 17:51    146/87    


 


9/13/17 17:47  72 17     


 


9/13/17 17:47  71 17  100   


 


9/13/17 17:46    147/91    


 


9/13/17 17:42  77 15  100   


 


9/13/17 17:42  77 15     


 


9/13/17 17:41    147/95    


 


9/13/17 17:38    155/94    


 


9/13/17 17:37  79 16  100   


 


9/13/17 17:37  79 16     


 


9/13/17 17:37 37.9 80 16 155/94 100 Oxymask 10 


 


9/13/17 16:48 37.0 73 18 156/101 (119) 98 Room Air  


 


9/13/17 16:00      Room Air  


 


9/13/17 08:36     95 Room Air  


 


9/13/17 08:00     95 Room Air  


 


9/13/17 07:59 37.4 66 16 130/80 (97) 95 Room Air  


 


9/13/17 07:32 36.9 68 18 134/82 (99) 95 Room Air  











Notes


Mental Status:  alert / awake / arousable, participated in evaluation


Pt Amnestic to Procedure:  Yes


Nausea / Vomiting:  adequately controlled


Pain:  adequately controlled


Airway Patency, RR, SpO2:  stable & adequate


BP & HR:  stable & adequate


Hydration State:  stable & adequate


Anesthetic Complications:  no major complications apparent

## 2017-09-13 NOTE — DISCHARGE INSTRUCTIONS
Discharge Instructions


Date of Service


Sep 13, 2017.





Admission


Reason for Admission:  Renal Colic





Discharge


Discharge Diagnosis / Problem:  Renal Colic





Discharge Goals


Goal(s):  Decrease discomfort, Improve function, Diagnostic testing, 

Therapeutic intervention





Activity Recommendations


Activity Limitations:  resume your previous activity





.





Instructions / Follow-Up


Instructions / Follow-Up


Please follow-up with your primary care physician


Please follow-up with Dr. Angelic Cadena, urology, in one week


Take Pyridium as needed for pain


Take Flomax daily





Current Hospital Diet


Patient's current hospital diet: Regular Diet





Discharge Diet


Recommended Diet:  Regular Diet





Procedures


Procedures Performed:  


Cystoscopy, Stent placement right





Pending Studies


Studies pending at discharge:  no





Medical Emergencies








.


Who to Call and When:





Medical Emergencies:  If at any time you feel your situation is an emergency, 

please call 911 immediately.





.





Non-Emergent Contact


Non-Emergency issues call your:  Primary Care Provider, Urologist





.


.








"Provider Documentation" section prepared by Chayo Ron.








.





VTE Core Measure


Inpt VTE Proph given/why not?:  SCD's

## 2017-09-13 NOTE — UROLOGY CONSULTATION
History


General


Date of Service:


Sep 13, 2017.


Primary Care Physician:


Dev Post M.D.


Pt seen a urologist before?:  Yes





History of Present Illness


I am asked by Dr Fairbanks to evaluate and treat patient for right ureteral stone.

  he has failed outpatient med therapy.  he is also constipated.  The 6mm right 

UPJ stone has not moved at all since ct scan.  Pain is severe.  No fevers.





Imaging


Imaging:  CT, KUB, Ultrasound





Laboratory





Results Past 24 Hours








Test


  9/12/17


19:45 Range/Units


 


 


White Blood Count 12.31 4.8-10.8  K/uL


 


Red Blood Count 4.70 4.7-6.1  M/uL


 


Hemoglobin 14.7 14.0-18.0  g/dL


 


Hematocrit 43.2 42-52  %


 


Mean Corpuscular Volume 91.9   fL


 


Mean Corpuscular Hemoglobin 31.3 25-34  pg


 


Mean Corpuscular Hemoglobin


Concent 34.0


  32-36  g/dl


 


 


Platelet Count 176 130-400  K/uL


 


Mean Platelet Volume 10.6 7.4-10.4  fL


 


Neutrophils (%) (Auto) 82.1  %


 


Lymphocytes (%) (Auto) 8.2  %


 


Monocytes (%) (Auto) 8.9  %


 


Eosinophils (%) (Auto) 0.1  %


 


Basophils (%) (Auto) 0.1  %


 


Neutrophils # (Auto) 10.10 1.4-6.5  K/uL


 


Lymphocytes # (Auto) 1.01 1.2-3.4  K/uL


 


Monocytes # (Auto) 1.10 0.11-0.59  K/uL


 


Eosinophils # (Auto) 0.01 0-0.5  K/uL


 


Basophils # (Auto) 0.01 0-0.2  K/uL


 


RDW Standard Deviation 43.1 36.4-46.3  fL


 


RDW Coefficient of Variation 12.8 11.5-14.5  %


 


Immature Granulocyte % (Auto) 0.6  %


 


Immature Granulocyte # (Auto) 0.08 0.00-0.02  K/uL


 


Urine Color YELLOW  


 


Urine Appearance CLOUDY CLEAR  


 


Urine pH 5.0 4.5-7.5  


 


Urine Specific Gravity 1.019 1.000-1.030  


 


Urine Protein 1+ NEG  


 


Urine Glucose (UA) NEG NEG  


 


Urine Ketones TRACE NEG  


 


Urine Occult Blood 3+ NEG  


 


Urine Nitrite NEG NEG  


 


Urine Bilirubin NEG NEG  


 


Urine Urobilinogen NEG NEG  


 


Urine Leukocyte Esterase NEG NEG  


 


Urine WBC (Auto) 1-5 0-5  /hpf


 


Urine RBC (Auto) 10-30 0-4  /hpf


 


Urine Hyaline Casts (Auto) 1-5 0-5  /lpf


 


Urine Epithelial Cells (Auto) 0-5 0-5  /lpf


 


Urine Bacteria (Auto) NEG NEG  


 


Sodium Level 137 136-145  mmol/L


 


Potassium Level 4.1 3.5-5.1  mmol/L


 


Chloride Level 103   mmol/L


 


Carbon Dioxide Level 27 21-32  mmol/L


 


Anion Gap 7.0 3-11  mmol/L


 


Blood Urea Nitrogen 17 7-18  mg/dl


 


Creatinine


  1.30


  0.60-1.40


mg/dl


 


Est Creatinine Clear Calc


Drug Dose 59.8


   ml/min


 


 


Estimated GFR (


American) 64.5


   


 


 


Estimated GFR (Non-


American 55.7


   


 


 


BUN/Creatinine Ratio 13.3 10-20  


 


Random Glucose 106 70-99  mg/dl


 


Calcium Level 8.6 8.5-10.1  mg/dl


 


Total Bilirubin 1.1 0.2-1  mg/dl


 


Direct Bilirubin 0.2 0-0.2  mg/dl


 


Aspartate Amino Transf


(AST/SGOT) 15


  15-37  U/L


 


 


Alanine Aminotransferase


(ALT/SGPT) 20


  12-78  U/L


 


 


Alkaline Phosphatase 67   U/L


 


Total Protein 6.9 6.4-8.2  gm/dl


 


Albumin 3.6 3.4-5.0  gm/dl


 


Lipase 151   U/L





Labs were reviewed and are within normal limits unless listed below. Labs are 

available in the chart and at [facility]





Problem List


Medical Problems:


(1) Kidney stone


Status: Acute  





(2) Renal colic


Status: Acute  





(3) Right flank pain


Status: Acute  











Past History


hypertension


Past Surgical History:  orthopedic surgery (bilateral knee replace)





Family History





FH: CHF (congestive heart failure)


  SISTER


FH: HTN (hypertension)


  BROTHER


  SISTER


No significant history


  FATHER


  MOTHER





Social History


Hx Tobacco Use In Past Year?:  No


Smoking:  non-smoker


Alcohol:  socially


Marital status:  single, 


Housing status:  lives with family


Occupation status:  retired





Immunizations


History of Influenza Vaccine:  No


History of Tetanus Vaccine?:  No


History of Pneumococcal:  No


History of Hepatitis B Vaccine:  No





Allergies


Coded Allergies:  


     Tamsulosin (Unverified  Allergy, Unknown, ., 9/12/17)





Medications


Home Medications:





Home Meds and Scripts








 Medications  Dose


 Route/Sig


 Max Daily Dose Days Date Category


 


 Stool Softener


  (Docusate Sodium)


 100 Mg Cap  100 Mg


 PO AS DIRECTED


    9/12/17 Reported


 


 Uroxatral


  (Alfuzosin HCl)


 10 Mg Tab  10 Mg


 PO DAILY


    9/10/17 Rx


 


 Zofran Odt


  (Ondansetron HCl)


 4 Mg Tab  4 Mg


 SL Q6H


    9/10/17 Rx


 


 Roxicodone Ir


  (Oxycodone HCl) 5


 Mg Tab  1-2 Tab


 PO Q4H PRN


    9/10/17 Rx


 


 Atorvastatin


 Calcium


  (Atorvastatin) 10


 Mg Tab  10 Mg


 PO DAILY


    9/10/17 Reported


 


 Aspirin Chewable


  (Aspirin) 81 Mg


 Chew  81 Mg


 PO DAILY


    2/12/16 Reported


 


 Levothyroxine


 Sodium 125 Mcg Tab  125 Mcg


 PO QAM


    10/18/13 Reported


 


 Mvi With Minerals


  (Multivitamins/Minerals)


 Tab  1 Tab


 PO DAILY


    9/4/09 Reported








Inpatient Medications:





Current Inpatient Medications








 Medications


  (Trade)  Dose


 Ordered  Sig/Mera


 Route  Start Time


 Stop Time Status Last Admin


Dose Admin


 


 Miscellaneous


  (Iv Fluids


 Completed)  1 ea  PRN  PRN


 N/A  9/12/17 21:30


 9/12/18 21:29   


 


 


 Acetaminophen


  (Tylenol Tab)  650 mg  Q4H  PRN


 PO  9/12/17 21:30


 10/12/17 21:29   


 


 


 Sodium Chloride  1,000 ml @ 


 80 mls/hr  [id number]


 IV  9/12/17 21:30


 10/12/17 21:29  9/13/17 13:07


80 MLS/HR


 


 Ondansetron HCl


  (Zofran Inj)  4 mg  Q6H  PRN


 IV  9/12/17 21:30


 10/12/17 21:29   


 


 


 Ketorolac


 Tromethamine


  (Toradol Inj)  15 mg  Q6H  PRN


 IV.  9/12/17 21:30


 9/17/17 21:29   


 


 


 Hydromorphone HCl


  (Dilaudid Inj)  0.5 mg  Q3H  PRN


 IV  9/12/17 21:30


 9/26/17 21:29   


 


 


 Lorazepam


  (Ativan Inj)  0.5 mg  Q4H  PRN


 IV  9/12/17 21:30


 10/12/17 21:29   


 


 


 Senna/Docusate


 Sodium


  (Senokot S Tab)  1 tab  BID


 PO  9/13/17 09:00


 10/13/17 08:59  9/13/17 08:45


1 TAB


 


 Polyethylene


  (Miralax Powder


 Packet)  17 gm  DAILY  PRN


 PO  9/12/17 21:30


 10/12/17 21:29   


 


 


 Alfuzosin HCl


  (Uroxatral Tab)  10 mg  DAILY


 PO  9/13/17 09:00


 10/13/17 08:59  9/13/17 08:45


10 MG


 


 Aspirin


  (Aspirin Chew)  81 mg  DAILY


 PO  9/13/17 09:00


 10/13/17 08:59  9/13/17 08:45


81 MG


 


 Atorvastatin


 Calcium


  (Lipitor Tab)  10 mg  DAILY


 PO  9/13/17 09:00


 10/13/17 08:59  9/13/17 08:45


10 MG


 


 Levothyroxine


 Sodium


  (Synthroid Tab)  125 mcg  DAILYBB


 PO  9/13/17 06:30


 10/13/17 06:59  9/13/17 05:42


125 MCG


 


 Multivitamins/


 Minerals


  (Multivitamin W/


 Minerals Tab)  1 tab  DAILY


 PO  9/13/17 09:00


 10/13/17 08:59  9/13/17 08:45


1 TAB


 


 Oxycodone/


 Acetaminophen


  (Percocet


 5-325mg Tab)  pain not


 relieved


 by tylenol  Q4H  PRN


 PO  9/12/17 21:30


 9/26/17 21:29   


 











Review of Systems


Review of Systems


Constitutional:  No fever, No chills, No weight loss


Neurological:  No dizzy


Endocrine:  No excessive thirst, No too hot, No too cold, No tired/sluggish


Cardiovascular:  No palpitations, No swelling ankles/feet


Respiratory:  No shortness of breath, No chronic cough


Male :  + weak stream, + kidney stones, + nocturia more than once/night





Physical Exam


Vital Signs:





Vital Signs Past 12 Hours








  Date Time  Temp Pulse Resp B/P (MAP) Pulse Ox O2 Delivery O2 Flow Rate FiO2


 


9/13/17 08:36     95 Room Air  


 


9/13/17 08:00     95 Room Air  


 


9/13/17 07:59 37.4 66 16 130/80 (97) 95 Room Air  


 


9/13/17 07:32 36.9 68 18 134/82 (99) 95 Room Air  








Physical Exam:


General Appearance:  WD/WN, no apparent distress, + thin


Eyes:  bilateral eyes normal inspection


ENT:  hearing grossly normal


Neck:  no JVD, trachea midline


Respiratory/Chest:  no respiratory distress, no accessory muscle use


Cardiovascular:  no edema


Extremities:  non-tender, no pedal edema, no calf tenderness


Neurologic/Psychiatric:  alert, normal mood/affect, oriented x 3


Skin:  normal color, warm/dry, no rash


Lymphatic:  no adenopathy





Assessment & Plan


Assessment & Plan


Imaging:  CT





right UPJ stone


plan uscope laser litho basket stone extraction stent


I described surgery and risk of failure infection bleeding ureteral injury


ancef on call. 


home tonight.

## 2017-09-13 NOTE — DIAGNOSTIC IMAGING REPORT
FLUOROSCOPIC IMAGES FROM RETROGRADE EXAM, LITHOTRIPSY AND STENT INSERTION



CLINICAL HISTORY: CYSTO/LASER/STENT    



COMPARISON STUDY:  CT of the abdomen and pelvis September 10, 2017 and KUB and

renal ultrasound performed September 12, 2017.



Fluoroscopy time: 46 seconds.



FINDINGS: Single fluoroscopic image demonstrates placement of a right ureteral

stent. Proximal aspect of stent projects over right renal pelvis. Distal aspect

is not visualized on this exam. The right ureteral calculus shown on prior CT

projects over the proximal aspect of the stent.



IMPRESSION:  Fluoroscopic image demonstrating placement of right ureteral stent

with suspected visualization of the proximal right ureteral calculus. 









Electronically signed by:  Akshat Rose M.D.

9/13/2017 5:42 PM



Dictated Date/Time:  9/13/2017 5:40 PM

## 2017-09-13 NOTE — PROGRESS NOTE
Subjective


Date of Service:


Sep 13, 2017.


Subjective


Pt evaluation today including:  conversation w/ patient, physical exam, lab 

review, review of studies, review of inpatient medication list


Saw/examined the patient in room 257


No problems/issues to note; flank pain subsided almost completely


Denies fevers/chills





Problem List


Medical Problems:


(1) Kidney stone


Status: Acute  





(2) Renal colic


Status: Acute  





(3) Right flank pain


Status: Acute  











Review of Systems


Constitutional:  No fever, No chills


Abdomen:  No pain (resolved), No nausea, No vomiting, No diarrhea


Male :  No dysuria, No urinary frequency, No hematuria





Medications





Current Inpatient Medications








 Medications


  (Trade)  Dose


 Ordered  Sig/Mera


 Route  Start Time


 Stop Time Status Last Admin


Dose Admin


 


 Miscellaneous


  (Iv Fluids


 Completed)  1 ea  PRN  PRN


 N/A  9/12/17 21:30


 9/12/18 21:29   


 


 


 Acetaminophen


  (Tylenol Tab)  650 mg  Q4H  PRN


 PO  9/12/17 21:30


 10/12/17 21:29   


 


 


 Sodium Chloride  1,000 ml @ 


 80 mls/hr  [id number]


 IV  9/12/17 21:30


 10/12/17 21:29  9/13/17 13:07


80 MLS/HR


 


 Ondansetron HCl


  (Zofran Inj)  4 mg  Q6H  PRN


 IV  9/12/17 21:30


 10/12/17 21:29   


 


 


 Ketorolac


 Tromethamine


  (Toradol Inj)  15 mg  Q6H  PRN


 IV.  9/12/17 21:30


 9/17/17 21:29   


 


 


 Hydromorphone HCl


  (Dilaudid Inj)  0.5 mg  Q3H  PRN


 IV  9/12/17 21:30


 9/26/17 21:29   


 


 


 Lorazepam


  (Ativan Inj)  0.5 mg  Q4H  PRN


 IV  9/12/17 21:30


 10/12/17 21:29   


 


 


 Senna/Docusate


 Sodium


  (Senokot S Tab)  1 tab  BID


 PO  9/13/17 09:00


 10/13/17 08:59  9/13/17 08:45


1 TAB


 


 Polyethylene


  (Miralax Powder


 Packet)  17 gm  DAILY  PRN


 PO  9/12/17 21:30


 10/12/17 21:29   


 


 


 Alfuzosin HCl


  (Uroxatral Tab)  10 mg  DAILY


 PO  9/13/17 09:00


 10/13/17 08:59  9/13/17 08:45


10 MG


 


 Aspirin


  (Aspirin Chew)  81 mg  DAILY


 PO  9/13/17 09:00


 10/13/17 08:59  9/13/17 08:45


81 MG


 


 Atorvastatin


 Calcium


  (Lipitor Tab)  10 mg  DAILY


 PO  9/13/17 09:00


 10/13/17 08:59  9/13/17 08:45


10 MG


 


 Levothyroxine


 Sodium


  (Synthroid Tab)  125 mcg  DAILYBB


 PO  9/13/17 06:30


 10/13/17 06:59  9/13/17 05:42


125 MCG


 


 Multivitamins/


 Minerals


  (Multivitamin W/


 Minerals Tab)  1 tab  DAILY


 PO  9/13/17 09:00


 10/13/17 08:59  9/13/17 08:45


1 TAB


 


 Oxycodone/


 Acetaminophen


  (Percocet


 5-325mg Tab)  pain not


 relieved


 by tylenol  Q4H  PRN


 PO  9/12/17 21:30


 9/26/17 21:29   


 











Objective


Vital Signs











  Date Time  Temp Pulse Resp B/P (MAP) Pulse Ox O2 Delivery O2 Flow Rate FiO2


 


9/13/17 08:36     95 Room Air  


 


9/13/17 08:00     95 Room Air  


 


9/13/17 07:59 37.4 66 16 130/80 (97) 95 Room Air  


 


9/13/17 07:32 36.9 68 18 134/82 (99) 95 Room Air  


 


9/13/17 00:37 36.8 66 20 124/81 (95) 93 Room Air  


 


9/13/17 00:02 37.0 68 18 115/73  Room Air  


 


9/12/17 22:49  72 16 119/73 96   


 


9/12/17 22:42  72 16 119/73 95 Room Air  


 


9/12/17 21:48 37.1 78 16 124/79 94 Room Air  


 


9/12/17 21:21  89      


 


9/12/17 20:17  92 16 151/94 96 Room Air  


 


9/12/17 18:51 37.5 92 20 159/91 96 Room Air  











Physical Exam


General Appearance:  no apparent distress


Respiratory/Chest:  no respiratory distress, no accessory muscle use


Cardiovascular:  regular rate, rhythm


Abdomen:  normal bowel sounds, non tender, soft


Extremities:  normal inspection, no pedal edema





Laboratory Results





Last 24 Hours








Test


  9/12/17


19:45


 


White Blood Count 12.31 K/uL 


 


Red Blood Count 4.70 M/uL 


 


Hemoglobin 14.7 g/dL 


 


Hematocrit 43.2 % 


 


Mean Corpuscular Volume 91.9 fL 


 


Mean Corpuscular Hemoglobin 31.3 pg 


 


Mean Corpuscular Hemoglobin


Concent 34.0 g/dl 


 


 


Platelet Count 176 K/uL 


 


Mean Platelet Volume 10.6 fL 


 


Neutrophils (%) (Auto) 82.1 % 


 


Lymphocytes (%) (Auto) 8.2 % 


 


Monocytes (%) (Auto) 8.9 % 


 


Eosinophils (%) (Auto) 0.1 % 


 


Basophils (%) (Auto) 0.1 % 


 


Neutrophils # (Auto) 10.10 K/uL 


 


Lymphocytes # (Auto) 1.01 K/uL 


 


Monocytes # (Auto) 1.10 K/uL 


 


Eosinophils # (Auto) 0.01 K/uL 


 


Basophils # (Auto) 0.01 K/uL 


 


RDW Standard Deviation 43.1 fL 


 


RDW Coefficient of Variation 12.8 % 


 


Immature Granulocyte % (Auto) 0.6 % 


 


Immature Granulocyte # (Auto) 0.08 K/uL 


 


Urine Color YELLOW 


 


Urine Appearance CLOUDY 


 


Urine pH 5.0 


 


Urine Specific Gravity 1.019 


 


Urine Protein 1+ 


 


Urine Glucose (UA) NEG 


 


Urine Ketones TRACE 


 


Urine Occult Blood 3+ 


 


Urine Nitrite NEG 


 


Urine Bilirubin NEG 


 


Urine Urobilinogen NEG 


 


Urine Leukocyte Esterase NEG 


 


Urine WBC (Auto) 1-5 /hpf 


 


Urine RBC (Auto) 10-30 /hpf 


 


Urine Hyaline Casts (Auto) 1-5 /lpf 


 


Urine Epithelial Cells (Auto) 0-5 /lpf 


 


Urine Bacteria (Auto) NEG 


 


Sodium Level 137 mmol/L 


 


Potassium Level 4.1 mmol/L 


 


Chloride Level 103 mmol/L 


 


Carbon Dioxide Level 27 mmol/L 


 


Anion Gap 7.0 mmol/L 


 


Blood Urea Nitrogen 17 mg/dl 


 


Creatinine 1.30 mg/dl 


 


Est Creatinine Clear Calc


Drug Dose 59.8 ml/min 


 


 


Estimated GFR (


American) 64.5 


 


 


Estimated GFR (Non-


American 55.7 


 


 


BUN/Creatinine Ratio 13.3 


 


Random Glucose 106 mg/dl 


 


Calcium Level 8.6 mg/dl 


 


Total Bilirubin 1.1 mg/dl 


 


Direct Bilirubin 0.2 mg/dl 


 


Aspartate Amino Transf


(AST/SGOT) 15 U/L 


 


 


Alanine Aminotransferase


(ALT/SGPT) 20 U/L 


 


 


Alkaline Phosphatase 67 U/L 


 


Total Protein 6.9 gm/dl 


 


Albumin 3.6 gm/dl 


 


Lipase 151 U/L 











Assessment and Plan


This is a 67 year old male with a PMH of hypothyroidism, HLD presents with 

right renal colic





Right UVJ Nephrolithiasis


CT shows a 6mm UVJ stone


presented with extreme pain


started on Percocet PRN


noted Flomax allergy; started on an alpha blocker


IVFs


urology consulted for further input





Hypothyroid


continue current medications/dose (125mcg)





HLD


continue statin





DVT ppx


SCDs





FULL CODE

## 2017-09-13 NOTE — DISCHARGE SUMMARY
Discharge Summary


Date of Service


Sep 13, 2017.





Discharge Summary


Admission Date:


Sep 12, 2017 at 21:34


Discharge Date:  Sep 13, 2017


Discharge Disposition:  Home


Principal Diagnosis:


Renal Colic, R UVJ stone





Medication Reconciliation


New Medications:  


Phenazopyridine HCl (Phenazopyridine HCl) 200 Mg Tab


200 MG PO TID PRN for Bladder pain for 3 Days, #9 TAB





 


Changed Medications:  


Tamsulosin Hcl (Flomax) 0.4 Mg Cap


1 CAP PO DAILY for 30 Days, #30 CAP 0 Refills (Changed from: Alfuzosin Hcl (

Uroxatral) 10 Mg Tab 10 Mg PO DAILY #10 TAB)





 


Continued Medications:  


Aspirin (Aspirin Chewable) 81 Mg Chew


81 MG PO DAILY





Atorvastatin (Atorvastatin Calcium) 10 Mg Tab


10 MG PO DAILY





Docusate Sodium (Stool Softener) 100 Mg Cap


100 MG PO AS DIRECTED





Levothyroxine Sodium (Levothyroxine Sodium) 125 Mcg Tab


125 MCG PO QAM





Multivitamins/Minerals (Mvi With Minerals)  Tab


1 TAB PO DAILY





Ondasetron Odt (Zofran Odt) 4 Mg Tab


4 MG SL Q6H for Nausea, #10 TAB





Oxycodone Ir (Roxicodone Ir) 5 Mg Tab


1-2 TAB PO Q4H PRN for Pain, #10 TAB











Admission Information


Physical Exam (per Admitting):


DATE OF ADMISSION:  09/12/2017


 


PRIMARY CARE DOCTOR:  Dr. Post.


 


CHIEF COMPLAINT:  Right flank/kidney stone pain.


 


HISTORY OF PRESENT ILLNESS: 





History obtained from patient and records. 





Medical history significant for BPH, urolithiasis, hypertension, PVD, arthritis,


hypothyroidism.  





Recent confinement under orthopedic service 2013 for right knee surgery.  





As per the patient, in 05/2017, the patient was found to have a


nonobstructing right kidney stone on CT scan done at [facility]


in preparation for kidney donor candidacy, no symptoms.  


He was declined as a kidney transplant donor due to incidental finding of 


50-60% stenosis of the renal arteries. 





Last few days, the patient noted right flank pain, achy, some nausea, no 

vomiting.


Seen at the Emergency Room a few days ago.  


CAT scan of the abdomen and pelvis showed 6 x


4 mm obstructing calculus right UPJ with mild to moderate right


hydronephrosis, exophytic nodule mid right kidney.  


Patient discharged on pain medication.


Told to follow up w/ outpatient urology.  


No improvement of symptoms.  


Patient constipated last few days, some chills, no hematuria. 





Patient returned to the Emergency Room with intractable pain.


 


MEDICAL HISTORY:  As above.


 


SURGERIES:  He has had orthopedic procedures, prostate procedures.


 


HOME MEDICATIONS:  Include aspirin, atorvastatin, stool softener,


levothyroxine, multivitamins, Uroxatral.


 


ALLERGIES:  TAMSULOSIN.


 


FAMILY HISTORY:  Kidney disease, heart disease.


 


PERSONAL AND SOCIAL HISTORY:  Nonsmoker, no chronic intake of alcoholic


beverages.  Retired educator.  


 


REVIEW OF SYSTEMS:  As per HPI, all other ROS negative.


 


PHYSICAL EXAMINATION:


VITAL SIGNS:  Blood pressure was noted to be 150/93, pulse 72, RR 16,


temperature 36.5, sats 96 on room air.


GENERAL:  Noted to be slightly uncomfortable.  No respiratory distress. 


Looks younger for stated age.


SKIN:  Normal color.


HEENT:  Pink palpebral conjunctivae.  Dry mucosa.


NECK:  No JVD.  Supple.


CHEST:  Clear to auscultation.


HEART:  Regular rate and rhythm.


ABDOMEN:  Soft.


BACK:  No flank tenderness.


EXTREMITIES:  No edema, no tenderness.


NEUROLOGIC:  No gross focality.


 


LABORATORY DATA:  Hemoglobin 14.7, hematocrit 42.2, white cells 12.31,


platelets 176.  Sodium 137, potassium 4.1, chloride 103, CO2 of 27, BUN 17,


creatinine 0.3, glucose 106.


 


KUB x-ray showed no change in position of 6 mm right UPJ calculus.  Renal


ultrasound, mild right hydronephrosis, 2.8 mm right renal cyst.


 


ASSESSMENT:


1.  Right renal colic.


No sepsis.  


Failed outpatient treatment.  


Intractable symptoms.


2.  Narcotic-induced constipation.


3.  Hypertension, slightly elevated.


4.  History of BPH as per records


 


PLAN:  


GMF


Analgesia.  IV fluids.  Hold off on antibiotics for now as patient not septic.


Urology consult.  RE renal colic (Patient known to Dr. Cadena.)


Bowel regimen.  


DVT prophylaxis, SCDs.  


Full code.





Hospital Course


This is a 67 year old male with a PMH of hypothyroidism, HLD presents with 

right renal colic





Right UVJ Nephrolithiasis - stent placed as per urology


CT shows a 6mm UVJ stone


presented with extreme pain


started on Percocet PRN


noted Flomax allergy; started on an alpha blocker


IVFs


urology consulted for further input





Hypothyroid


continue current medications/dose (125mcg)





HLD


continue statin





DVT ppx


SCDs





FULL CODE


Total time spent on discharge = 20 minutes


This includes examination of the patient, discharge planning, medication 

reconciliation, and communication with other providers.





Discharge Instructions


Please follow-up with your primary care physician


Please follow-up with Dr. Angelic Cadena, urology, in one week


Take Pyridium as needed for pain


Take Flomax daily

## 2018-03-27 ENCOUNTER — HOSPITAL ENCOUNTER (INPATIENT)
Dept: HOSPITAL 45 - C.EDB | Age: 68
LOS: 5 days | Discharge: HOME | DRG: 854 | End: 2018-04-01
Attending: HOSPITALIST | Admitting: HOSPITALIST
Payer: COMMERCIAL

## 2018-03-27 VITALS — TEMPERATURE: 98.78 F

## 2018-03-27 VITALS
DIASTOLIC BLOOD PRESSURE: 75 MMHG | HEART RATE: 85 BPM | TEMPERATURE: 100.76 F | SYSTOLIC BLOOD PRESSURE: 125 MMHG | OXYGEN SATURATION: 94 %

## 2018-03-27 VITALS
BODY MASS INDEX: 26.39 KG/M2 | HEIGHT: 70 IN | WEIGHT: 184.31 LBS | HEIGHT: 70 IN | WEIGHT: 184.31 LBS | BODY MASS INDEX: 26.39 KG/M2

## 2018-03-27 VITALS
SYSTOLIC BLOOD PRESSURE: 143 MMHG | HEART RATE: 113 BPM | TEMPERATURE: 101.48 F | OXYGEN SATURATION: 93 % | DIASTOLIC BLOOD PRESSURE: 66 MMHG

## 2018-03-27 VITALS — HEART RATE: 113 BPM | SYSTOLIC BLOOD PRESSURE: 143 MMHG | DIASTOLIC BLOOD PRESSURE: 66 MMHG | TEMPERATURE: 101.48 F

## 2018-03-27 DIAGNOSIS — Z72.89: ICD-10-CM

## 2018-03-27 DIAGNOSIS — M70.21: ICD-10-CM

## 2018-03-27 DIAGNOSIS — Z91.81: ICD-10-CM

## 2018-03-27 DIAGNOSIS — Z79.899: ICD-10-CM

## 2018-03-27 DIAGNOSIS — E03.9: ICD-10-CM

## 2018-03-27 DIAGNOSIS — L03.113: ICD-10-CM

## 2018-03-27 DIAGNOSIS — A41.9: Primary | ICD-10-CM

## 2018-03-27 DIAGNOSIS — E78.5: ICD-10-CM

## 2018-03-27 DIAGNOSIS — Z79.82: ICD-10-CM

## 2018-03-27 LAB
ALBUMIN SERPL-MCNC: 3.7 GM/DL (ref 3.4–5)
ALP SERPL-CCNC: 68 U/L (ref 45–117)
ALT SERPL-CCNC: 23 U/L (ref 12–78)
AST SERPL-CCNC: 18 U/L (ref 15–37)
BASOPHILS # BLD: 0.02 K/UL (ref 0–0.2)
BASOPHILS NFR BLD: 0.1 %
BUN SERPL-MCNC: 17 MG/DL (ref 7–18)
CA-I BLD-SCNC: 1.14 MMOL/L (ref 1.12–1.32)
CALCIUM SERPL-MCNC: 9.2 MG/DL (ref 8.5–10.1)
CK MB SERPL-MCNC: 0.7 NG/ML (ref 0.5–3.6)
CO2 SERPL-SCNC: 27 MMOL/L (ref 21–32)
CREAT BLD-MCNC: 1 MG/DL (ref 0.6–1.3)
CREAT SERPL-MCNC: 0.96 MG/DL (ref 0.6–1.4)
EOS ABS #: 0.03 K/UL (ref 0–0.5)
EOSINOPHIL NFR BLD AUTO: 207 K/UL (ref 130–400)
GLUCOSE SERPL-MCNC: 104 MG/DL (ref 70–99)
HCT VFR BLD CALC: 44.1 % (ref 42–52)
HGB BLD-MCNC: 15.5 G/DL (ref 14–18)
IG#: 0.08 K/UL (ref 0–0.02)
IMM GRANULOCYTES NFR BLD AUTO: 7 %
INR PPP: 0.9 (ref 0.9–1.1)
ISTAT POTASSIUM: 4.1 MEQ/L (ref 3.3–5)
LYMPHOCYTES # BLD: 1.17 K/UL (ref 1.2–3.4)
MCH RBC QN AUTO: 32 PG (ref 25–34)
MCHC RBC AUTO-ENTMCNC: 35.1 G/DL (ref 32–36)
MCV RBC AUTO: 90.9 FL (ref 80–100)
MONO ABS #: 1.24 K/UL (ref 0.11–0.59)
MONOCYTES NFR BLD: 7.4 %
NEUT ABS #: 14.11 K/UL (ref 1.4–6.5)
NEUTROPHILS # BLD AUTO: 0.2 %
NEUTROPHILS NFR BLD AUTO: 84.8 %
PMV BLD AUTO: 10.7 FL (ref 7.4–10.4)
POTASSIUM SERPL-SCNC: 4.1 MMOL/L (ref 3.5–5.1)
PROT SERPL-MCNC: 7.5 GM/DL (ref 6.4–8.2)
RED CELL DISTRIBUTION WIDTH CV: 13.3 % (ref 11.5–14.5)
RED CELL DISTRIBUTION WIDTH SD: 43.8 FL (ref 36.4–46.3)
SODIUM BLD-SCNC: 141 MEQ/L (ref 135–144)
SODIUM SERPL-SCNC: 137 MMOL/L (ref 136–145)
WBC # BLD AUTO: 16.65 K/UL (ref 4.8–10.8)

## 2018-03-27 RX ADMIN — PIPERACILLIN AND TAZOBACTAM SCH MLS/HR: 3; .375 INJECTION, POWDER, LYOPHILIZED, FOR SOLUTION INTRAVENOUS; PARENTERAL at 21:40

## 2018-03-27 NOTE — DIAGNOSTIC IMAGING REPORT
CHEST ONE VIEW PORTABLE



CLINICAL HISTORY: 68 years-old Male presenting with fever. 



TECHNIQUE: Portable upright AP view of the chest was obtained.



COMPARISON: 10/18/2013.



FINDINGS:

Atherosclerosis of the aortic arch. Tortuosity of the descending thoracic aorta.

Cardiac silhouette normal in size. Lungs and pleural spaces clear. Degenerative

changes of the thoracic spine. Upper abdomen normal.



IMPRESSION:

1.  No acute cardiopulmonary disease.







Electronically signed by:  Dev Tabares M.D.

3/27/2018 1:33 PM



Dictated Date/Time:  3/27/2018 1:32 PM

## 2018-03-27 NOTE — DIAGNOSTIC IMAGING REPORT
R ELBOW MIN 3 VIEWS ROUTINE



CLINICAL HISTORY: Right elbow pain. Fever.    



COMPARISON: None



FINDINGS:  Alignment of the right elbow is anatomic. There is no joint effusion.

No fracture or suspicious lesion is present. 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

the right elbow. Note is made of diffuse soft tissue swelling of the right

elbow, most pronounced overlying the olecranon. 



IMPRESSION: 



1. No acute fracture or joint effusion of the right elbow.



2. No radiographic evidence of osteomyelitis.



3. Diffuse right elbow soft tissue swelling, most pronounced overlying the

olecranon.







Electronically signed by:  Akshat Rose M.D.

3/27/2018 1:20 PM



Dictated Date/Time:  3/27/2018 1:19 PM

## 2018-03-27 NOTE — HISTORY AND PHYSICAL
History & Physical


Date & Time of Service:


Mar 27, 2018 at 14:44


Chief Complaint:


R Elbow Inflammation


Primary Care Physician:


Dev Post M.D.


History of Present Illness


Source:  patient, clinic records, hospital records


This is a 68-year-old male with a past medical history of hypothyroidism, HLD 

and history of renal stones who presents with worsening right elbow pain and 

swelling 2 days.  Patient was carrying plywood up a flight of stairs ~6 weeks 

ago when he lost his balance and fell into the wall, hitting his elbow.  Has 

experienced pain and tenderness since then but has been able to continue daily 

routine including working on his house, so was not too concerned.  Started to 

notice some redness to his elbow earlier this week but as of 2 days ago, 

redness had spread down the forearm with associated warmth.  Went running this 

morning and once he returned, he felt unable to get warm.  Was seen by PCP and 

had a low grade fever ~100 degree F and was sent over to the ED with concern 

for septic joint.  Patient denies any lightheadedness, visual changes, confusion

, chest pain, shortness of breath, abdominal pain, nausea, vomiting, bowel or 

bladder changes or LE swelling.





Past Medical/Surgical History


Medical Problems:


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


Status: Chronic  





(2) HLD (hyperlipidemia)


Status: Chronic  





(3) Hypothyroidism


Status: Chronic  





(4) Renal colic


Status: Chronic  





Surgical Problems:


(1) History of total bilateral knee replacement


Status: Chronic  








Family History





FH: CHF (congestive heart failure)


  SISTER


FH: HTN (hypertension)


  BROTHER


  SISTER


No significant history


  FATHER


  MOTHER





Social History


Smoking Status:  Never Smoker


Alcohol Use:  socially (2 drinks every evening (wine) )


Marital Status:  


Housing status:  lives with significant other


Occupational Status:  retired





Immunizations


History of Influenza Vaccine:  No


History of Tetanus Vaccine?:  No


History of Pneumococcal:  No


History of Hepatitis B Vaccine:  No





Allergies


Coded Allergies:  


     No Known Allergies (Unverified , 3/27/18)





Home Medications


Scheduled


Aspirin (Aspirin Chewable), 81 MG PO QAM


Atorvastatin (Lipitor), 10 MG PO QAM


Levothyroxine Sodium (Levothyroxine Sodium), 125 MCG PO QAM


Multivitamins/Minerals (Mvi With Minerals), 1 TAB PO QAM





Scheduled PRN


Melatonin-Pyridoxine (Melatonin), 200 MCG PO HS PRN for Sleep





Review of Systems


Ten systems reviewed and negative except as noted in the HPI.





Physical Exam


Vital Signs











  Date Time  Temp Pulse Resp B/P (MAP) Pulse Ox O2 Delivery O2 Flow Rate FiO2


 


3/27/18 12:17 37.2 107 18 141/89 97 Room Air  








General Appearance:  WD/WN, no apparent distress


Head:  normocephalic, atraumatic


Eyes:  normal inspection, sclerae normal


ENT:  normal ENT inspection, hearing grossly normal, pharynx normal


Neck:  supple, thyroid normal, trachea midline


Respiratory/Chest:  chest non-tender, lungs clear, normal breath sounds, no 

respiratory distress, no accessory muscle use


Cardiovascular:  regular rate, rhythm, no murmur, normal peripheral pulses


Abdomen/GI:  non tender, soft, no organomegaly


Back:  normal inspection


Extremities/Musculoskelatal:  no calf tenderness, no pedal edema, + pertinent 

finding (Diffuse erythema, edema of R elbow with extension down forearm. Warm 

to touch, TTP at the olecranon. ROM intact. )


Neurologic/Psych:  no motor/sensory deficits, alert, normal mood/affect, 

oriented x 3


Skin:  normal color, warm/dry





Diagnostics


Laboratory Results





Results Past 24 Hours








Test


  3/27/18


13:00 3/27/18


13:12 3/27/18


13:17 Range/Units


 


 


White Blood Count 16.65   4.8-10.8  K/uL


 


Red Blood Count 4.85   4.7-6.1  M/uL


 


Hemoglobin 15.5   14.0-18.0  g/dL


 


Hematocrit 44.1   42-52  %


 


Mean Corpuscular Volume 90.9     fL


 


Mean Corpuscular Hemoglobin 32.0   25-34  pg


 


Mean Corpuscular Hemoglobin


Concent 35.1


  


  


  32-36  g/dl


 


 


Platelet Count 207   130-400  K/uL


 


Mean Platelet Volume 10.7   7.4-10.4  fL


 


Neutrophils (%) (Auto) 84.8    %


 


Lymphocytes (%) (Auto) 7.0    %


 


Monocytes (%) (Auto) 7.4    %


 


Eosinophils (%) (Auto) 0.2    %


 


Basophils (%) (Auto) 0.1    %


 


Neutrophils # (Auto) 14.11   1.4-6.5  K/uL


 


Lymphocytes # (Auto) 1.17   1.2-3.4  K/uL


 


Monocytes # (Auto) 1.24   0.11-0.59  K/uL


 


Eosinophils # (Auto) 0.03   0-0.5  K/uL


 


Basophils # (Auto) 0.02   0-0.2  K/uL


 


RDW Standard Deviation 43.8   36.4-46.3  fL


 


RDW Coefficient of Variation 13.3   11.5-14.5  %


 


Immature Granulocyte % (Auto) 0.5    %


 


Immature Granulocyte # (Auto) 0.08   0.00-0.02  K/uL


 


Prothrombin Time


  9.9


  


  


  9.0-12.0


SECONDS


 


Prothromb Time International


Ratio 0.9


  


  


  0.9-1.1  


 


 


Sodium Level 137   136-145  mmol/L


 


Potassium Level 4.1   3.5-5.1  mmol/L


 


Chloride Level 105     mmol/L


 


Carbon Dioxide Level 27   21-32  mmol/L


 


Anion Gap 6.0  18.0 16-25  mmol/L


 


Blood Urea Nitrogen 17   7-18  mg/dl


 


Creatinine


  0.96


  


  


  0.60-1.40


mg/dl


 


Est Creatinine Clear Calc


Drug Dose 73.9


  


  


   ml/min


 


 


Estimated GFR (


American) 92.4


  


  


   


 


 


Estimated GFR (Non-


American 79.8


  


  


   


 


 


BUN/Creatinine Ratio 17.6   10-20  


 


Random Glucose 104   70-99  mg/dl


 


Calcium Level 9.2   8.5-10.1  mg/dl


 


Magnesium Level 2.1   1.8-2.4  mg/dl


 


Total Bilirubin 0.8   0.2-1  mg/dl


 


Direct Bilirubin 0.2   0-0.2  mg/dl


 


Aspartate Amino Transf


(AST/SGOT) 18


  


  


  15-37  U/L


 


 


Alanine Aminotransferase


(ALT/SGPT) 23


  


  


  12-78  U/L


 


 


Alkaline Phosphatase 68     U/L


 


Total Creatine Kinase 73     U/L


 


Creatine Kinase MB 0.7   0.5-3.6  ng/ml


 


Creatine Kinase MB Ratio 1.0   0-3.0  


 


Troponin I < 0.015   0-0.045  ng/ml


 


Total Protein 7.5   6.4-8.2  gm/dl


 


Albumin 3.7   3.4-5.0  gm/dl


 


Bedside Lactic Acid Venous


  


  0.63


  


  0.90-1.70


mmol/L


 


Bedside Hemoglobin   14.3 14.0-18.0  g/dl


 


Bedside Hematocrit   42 42-52  %


 


Bedside Sodium   141 135-144  mEq/L


 


Bedside Potassium   4.1 3.3-5.0  mEq/L


 


Bedside Chloride   101 101-112  mEq/L


 


Bedside Total CO2   27 24-31  mEq/l


 


Bedside Blood Urea Nitrogen   18 7-18  mg/dl


 


Bedside Creatinine   1.0 0.6-1.3  mg/dl


 


Bedside Glucose (other)   112 70-99  mg/dl


 


Bedside Ionized Calcium (Nathan)


  


  


  1.14


  1.12-1.32


mmol/l








Microbiology Results


3/27/18 Blood Culture, Received


          Pending


3/27/18 Blood Culture, Received


          Pending





Diagnostic Radiology


R elbow XR: 


IMPRESSION: 


1. No acute fracture or joint effusion of the right elbow.


2. No radiographic evidence of osteomyelitis.


3. Diffuse right elbow soft tissue swelling, most pronounced overlying the


olecranon.


CXR normal





Impression


Assessment and Plan


This is a 68-year-old male with a past medical history of hypothyroidism, HLD 

and history of renal stones who presents with worsening right elbow pain and 

swelling 2 days.








RUE cellulitis, possible septic olecranon bursitis:


-Trauma to R elbow ~ 6 weeks ago


-Redness, swelling worsened over last 2 days 


- Meets SIRS criteria with tachycardia, leukocytosis of 16, known source of 

infection 


-Lactic acid wnl 


-R elbow XR with diffuse right elbow tissue swelling, most pronounced overlying 

the olecranon


 No evidence of osteomyelitis, no acute fracture or joint effusion


-Ortho consulted


   -24-48 hours of IV antibiotics


   -If no improvement, consider MRI vs surgical intervention


-Vanc and zosyn empirically 


-Blood cultures pending 


-IVF resuscitation 





Hypothyroidism:


-Cont levothyroxine 





HLD:


-Cont statin 





DVT Ppx: SCDs 


Code status: FULL per discussion with patient 


PCP: Sejal 


Dispo: Admit to med/surg. Plan to return home once medically stable.





Patient seen in collaboration with Dr. Elder. Please see addendum.  








Attending Note:





Patient is a 68 yr male with no significant PMH presents with history of 

worsening Right UE swelling, redness and tenderness. Patient admits to haveing 

a H/O trauma to his hand about 6 weeks ago. Also reports generalized weakness 

and fever. Denies any open wound, discharge.     








Physical Exam:


Vitals signs as noted above 


General Appearance:Moderately built and nourished, no apparent distress


Head:  normocephalic, Atraumatic 


Eyes:  normal inspection, EOMI, PERRL


Neck:  supple, Trachea midline


Respiratory/Chest: Normal breath sounds, CTA


Cardiovascular: S1, S2, No murmur


Abdomen/GI:Soft, Non tender, Bowel sounds present


Extremities/Musculoskelatal:normal inspection,RUE swelling, redness, tenderness 


Neurologic/Psych:AAOX3, grossly no focal neurological deficits 


Skin:normal color,warm and positive findings as above 








Assessment and Plan:





RUE cellulitis


Possible septic olecranon bursitis


Meets SIRS criteria


Started on IV Vanco and Zosyn


Appreciate Orthopedics Input


Follow up Blood cultures


IV fluids


Normal POC lactate levels


May need surgical intervention if clinically deteriorates





I personally reviewed the record. Patient is interviewed and examined at 

bedside. Patient's care is coordinated with Giovanna Farr PA-C. Please refer to 

the documentation above for details of patient's presentation and for 

discussion of  other issues.





Resuscitation Status








VTE Prophylaxis


Will order VTE Prophylaxis:  Yes

## 2018-03-27 NOTE — PHARMACY PROGRESS NOTE
Pharmacy Abx Initial Consult


Date of Service


Mar 27, 2018.





Pharmacy Dosing Scope


Date of Consult:  3/27/18


Consultation requested by: Giovanna Farr PAC





Pharmacy is consulted to initiate Vancomycin and Zosyn dosing therapy, order 

appropriate labs and adjust drug dose/frequency.





Subjective


The patient is a 68 year old male admitted on Mar 27, 2018 at 14:32 with septic 

joint following and incident with his elbow.  He had bumped recently while 

carrying a piece of plywood while working on his house.  It had not bothered 

him until today he noticed it was more red and warm and after returning from 

jogging he was unable to get warm.  He is now admitted for broad spectrum 

antibiotic therapy by DREA Farr.





Objective


Height (Feet):  5


Height (Inches):  10.00


Weight (Kilograms):  84.200


Vital Signs (Past 12Hrs)





Vital Signs Past 12 Hours








  Date Time  Temp Pulse Resp B/P (MAP) Pulse Ox O2 Delivery O2 Flow Rate FiO2


 


3/27/18 15:40 38.6 113 18 143/66 (91) 93 Room Air  


 


3/27/18 15:17 39.3 111 20 143/91 95   


 


3/27/18 15:06 39.3 111 20 143/91 95 Room Air  


 


3/27/18 12:17 37.2 107 18 141/89 97 Room Air  








Lab Results (24Hrs)





Laboratory Tests (24 Hours)








Test


  3/27/18


13:00


 


White Blood Count


  16.65 K/uL


(4.8-10.8)  H


 


Red Blood Count


  4.85 M/uL


(4.7-6.1)


 


Hemoglobin


  15.5 g/dL


(14.0-18.0)


 


Hematocrit


  44.1 % (42-52)


 


 


Mean Corpuscular Volume


  90.9 fL


()


 


Mean Corpuscular Hemoglobin


  32.0 pg


(25-34)


 


Mean Corpuscular Hemoglobin


Concent 35.1 g/dl


(32-36)


 


Platelet Count


  207 K/uL


(130-400)


 


Mean Platelet Volume


  10.7 fL


(7.4-10.4)  H


 


Neutrophils (%) (Auto) 84.8 %  


 


Lymphocytes (%) (Auto) 7.0 %  


 


Monocytes (%) (Auto) 7.4 %  


 


Eosinophils (%) (Auto) 0.2 %  


 


Basophils (%) (Auto) 0.1 %  


 


Neutrophils # (Auto)


  14.11 K/uL


(1.4-6.5)  H


 


Lymphocytes # (Auto)


  1.17 K/uL


(1.2-3.4)  L


 


Monocytes # (Auto)


  1.24 K/uL


(0.11-0.59)  H


 


Eosinophils # (Auto)


  0.03 K/uL


(0-0.5)


 


Basophils # (Auto)


  0.02 K/uL


(0-0.2)


 


Total Creatine Kinase


  73 U/L


()








Micro Results











 Date/Time


Source Procedure


Growth Status


 


 


 3/27/18 13:05


Blood Blood Culture


Pending Received


 


 3/27/18 13:00


Blood Blood Culture


Pending Received











Assessment & Plan


Assessment








68 year old male with septic joint (Elbow)





Plan





Vancomycin IV 


* Loading dose: 1500mg  (18 mg/kg)


* Maintenance dose: 1250 mg IV (15 mg/kg) every 12 hours


* Goal trough level : 15-20 mcg/mL


* Trough level ordered prior to noon dose on 3/29/18





Piperacillin/tazobactam


* 3.375 g bolus administered over 30 minutes, then 3.375g IV extended infusion 

every 8 hours for CrCl greater than 20 mL/min 








Pharmacy will continue to follow and will adjust dose/frequency as necessary.  

Thank you.

## 2018-03-27 NOTE — EMERGENCY ROOM VISIT NOTE
History


Report prepared by Gloriaibteressa:  Yessica Vega


Under the Supervision of:  Dr. Everette Loco D.O.


First contact with patient:  12:37


Chief Complaint:  ELBOW PAIN/INJURY


Stated Complaint:  R ELBOW INFLAMMATION





History of Present Illness


The patient is a 68 year old male who presents to the Emergency Room with 

complaints of worsening right elbow pain beginning two days ago. The patient 

reports swelling and redness to his right elbow beginning two days ago. He 

reports 10/10 stabbing pain when his elbow is being touched or movement. The 

patient reports he was carrying a piece of plywood and fell and hit his elbow 

about a month ago. He notes a fever beginning yesterday.  Patient denies any 

other complaints.  Pt denies headache, change in vision, chest pain, shortness 

of breath, nausea, vomiting, diarrhea, pain with urination, and melena.





   Source of History:  patient


   Onset:  two days ago


   Position:  elbow (right)


   Symptom Intensity:  10/10


   Quality:  stabbing


   Timing:  worsening


   Associated Symptoms:  + fevers, No chest pain, No SOB, No nausea, No vomiting

, No urinary symptoms





Review of Systems


See HPI for pertinent positives & negatives. A total of 10 systems reviewed and 

were otherwise negative.





Past Medical & Surgical


Medical Problems:


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


(2) HLD (hyperlipidemia)


(3) Hypothyroidism


(4) Renal colic


Surgical Problems:


(1) History of total bilateral knee replacement








Family History





FH: CHF (congestive heart failure)


  SISTER


FH: HTN (hypertension)


  BROTHER


  SISTER


No significant history


  FATHER


  MOTHER





Social History


Smoking Status:  Never Smoker


Marital Status:  single, 


Housing Status:  lives with family


Occupation Status:  retired





Current/Historical Medications


Scheduled


Aspirin (Aspirin Chewable), 81 MG PO QAM


Atorvastatin (Lipitor), 10 MG PO QAM


Levothyroxine Sodium (Levothyroxine Sodium), 125 MCG PO QAM


Multivitamins/Minerals (Mvi With Minerals), 1 TAB PO QAM





Scheduled PRN


Melatonin-Pyridoxine (Melatonin), 200 MCG PO HS PRN for Sleep





Allergies


Coded Allergies:  


     No Known Allergies (Unverified , 3/27/18)





Physical Exam


Vital Signs











  Date Time  Temp Pulse Resp B/P (MAP) Pulse Ox O2 Delivery O2 Flow Rate FiO2


 


3/27/18 12:17 37.2 107 18 141/89 97 Room Air  











Physical Exam


GENERAL: Sitting up in bed, alert, well appearing, well nourished, no distress, 

non-toxic 


EYE EXAM: normal conjunctiva. 


OROPHARYNX: no exudate, no erythema, lips, buccal mucosa, and tongue normal and 

mucous membranes are moist


NECK: supple, no nuchal rigidity, no adenopathy, non-tender


LUNGS: Clear to auscultation. Normal chest wall mechanics


HEART: no murmurs, S1 normal and S2 normal 


ABDOMEN: abdomen soft, non-tender, normo-active bowel sounds, no masses, no 

rebound or guarding. 


BACK: Back is symmetrical on inspection and there is no deformity, no midline 

tenderness, no CVA tenderness. 


SKIN: no rashes and no bruising 


UPPER EXTREMITIES: Swelling and erythema tracking from right wrist to just 

distal axilla, warm and tender to palpation, small amount of fluctuance over 

right elbow, radial pulses 2/6, gross sensation intact.


LOWER EXTREMITIES: No pitting edema.


NEURO EXAM: Normal sensorium, cranial nerves II-XII grossly intact, normal 

speech,  no gross weakness of arms, no gross weakness of legs.





Medical Decision & Procedures


ER Provider


Diagnostic Interpretation:


Radiology results as stated below per my review and the radiologist's 

interpretation: 





R ELBOW MIN 3 VIEWS ROUTINE





FINDINGS:  Alignment of the right elbow is anatomic. There is no joint effusion.


No fracture or suspicious lesion is present. 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


the right elbow. Note is made of diffuse soft tissue swelling of the right


elbow, most pronounced overlying the olecranon. 





IMPRESSION: 





1. No acute fracture or joint effusion of the right elbow.





2. No radiographic evidence of osteomyelitis.





3. Diffuse right elbow soft tissue swelling, most pronounced overlying the


olecranon.











Electronically signed by:  Akshat Rose M.D.





CHEST ONE VIEW PORTABLE





FINDINGS:


Atherosclerosis of the aortic arch. Tortuosity of the descending thoracic aorta.


Cardiac silhouette normal in size. Lungs and pleural spaces clear. Degenerative


changes of the thoracic spine. Upper abdomen normal.





IMPRESSION:


1.  No acute cardiopulmonary disease.











Electronically signed by:  Dev Tabares M.D.





Laboratory Results


3/27/18 13:00








Red Blood Count 4.85, Mean Corpuscular Volume 90.9, Mean Corpuscular Hemoglobin 

32.0, Mean Corpuscular Hemoglobin Concent 35.1, Mean Platelet Volume 10.7, 

Neutrophils (%) (Auto) 84.8, Lymphocytes (%) (Auto) 7.0, Monocytes (%) (Auto) 

7.4, Eosinophils (%) (Auto) 0.2, Basophils (%) (Auto) 0.1, Neutrophils # (Auto) 

14.11, Lymphocytes # (Auto) 1.17, Monocytes # (Auto) 1.24, Eosinophils # (Auto) 

0.03, Basophils # (Auto) 0.02





3/27/18 13:00

















Test


  3/27/18


13:00 3/27/18


13:12 3/27/18


13:17


 


White Blood Count


  16.65 K/uL


(4.8-10.8) 


  


 


 


Red Blood Count


  4.85 M/uL


(4.7-6.1) 


  


 


 


Hemoglobin


  15.5 g/dL


(14.0-18.0) 


  


 


 


Hematocrit 44.1 % (42-52)   


 


Mean Corpuscular Volume


  90.9 fL


() 


  


 


 


Mean Corpuscular Hemoglobin


  32.0 pg


(25-34) 


  


 


 


Mean Corpuscular Hemoglobin


Concent 35.1 g/dl


(32-36) 


  


 


 


Platelet Count


  207 K/uL


(130-400) 


  


 


 


Mean Platelet Volume


  10.7 fL


(7.4-10.4) 


  


 


 


Neutrophils (%) (Auto) 84.8 %   


 


Lymphocytes (%) (Auto) 7.0 %   


 


Monocytes (%) (Auto) 7.4 %   


 


Eosinophils (%) (Auto) 0.2 %   


 


Basophils (%) (Auto) 0.1 %   


 


Neutrophils # (Auto)


  14.11 K/uL


(1.4-6.5) 


  


 


 


Lymphocytes # (Auto)


  1.17 K/uL


(1.2-3.4) 


  


 


 


Monocytes # (Auto)


  1.24 K/uL


(0.11-0.59) 


  


 


 


Eosinophils # (Auto)


  0.03 K/uL


(0-0.5) 


  


 


 


Basophils # (Auto)


  0.02 K/uL


(0-0.2) 


  


 


 


RDW Standard Deviation


  43.8 fL


(36.4-46.3) 


  


 


 


RDW Coefficient of Variation


  13.3 %


(11.5-14.5) 


  


 


 


Immature Granulocyte % (Auto) 0.5 %   


 


Immature Granulocyte # (Auto)


  0.08 K/uL


(0.00-0.02) 


  


 


 


Prothrombin Time


  9.9 SECONDS


(9.0-12.0) 


  


 


 


Prothromb Time International


Ratio 0.9 (0.9-1.1) 


  


  


 


 


Est Creatinine Clear Calc


Drug Dose 73.9 ml/min 


  


  


 


 


Estimated GFR (


American) 92.4 


  


  


 


 


Estimated GFR (Non-


American 79.8 


  


  


 


 


BUN/Creatinine Ratio 17.6 (10-20)   


 


Calcium Level


  9.2 mg/dl


(8.5-10.1) 


  


 


 


Magnesium Level


  2.1 mg/dl


(1.8-2.4) 


  


 


 


Total Bilirubin


  0.8 mg/dl


(0.2-1) 


  


 


 


Direct Bilirubin


  0.2 mg/dl


(0-0.2) 


  


 


 


Aspartate Amino Transf


(AST/SGOT) 18 U/L (15-37) 


  


  


 


 


Alanine Aminotransferase


(ALT/SGPT) 23 U/L (12-78) 


  


  


 


 


Alkaline Phosphatase


  68 U/L


() 


  


 


 


Total Creatine Kinase


  73 U/L


() 


  


 


 


Creatine Kinase MB


  0.7 ng/ml


(0.5-3.6) 


  


 


 


Creatine Kinase MB Ratio 1.0 (0-3.0)   


 


Troponin I


  < 0.015 ng/ml


(0-0.045) 


  


 


 


Total Protein


  7.5 gm/dl


(6.4-8.2) 


  


 


 


Albumin


  3.7 gm/dl


(3.4-5.0) 


  


 


 


Bedside Lactic Acid Venous


  


  0.63 mmol/L


(0.90-1.70) 


 


 


Bedside Hemoglobin


  


  


  14.3 g/dl


(14.0-18.0)


 


Bedside Hematocrit   42 % (42-52) 


 


Bedside Sodium


  


  


  141 mEq/L


(135-144)


 


Bedside Potassium


  


  


  4.1 mEq/L


(3.3-5.0)


 


Bedside Chloride


  


  


  101 mEq/L


(101-112)


 


Bedside Total CO2


  


  


  27 mEq/l


(24-31)


 


Anion Gap


  


  


  18.0 mmol/L


(16-25)


 


Bedside Blood Urea Nitrogen


  


  


  18 mg/dl


(7-18)


 


Bedside Creatinine


  


  


  1.0 mg/dl


(0.6-1.3)


 


Bedside Glucose (other)


  


  


  112 mg/dl


(70-99)


 


Bedside Ionized Calcium (Nathan)


  


  


  1.14 mmol/l


(1.12-1.32)





Laboratory results per my review.





Medications Administered











 Medications


  (Trade)  Dose


 Ordered  Sig/Mera


 Route  Start Time


 Stop Time Status Last Admin


Dose Admin


 


 Sodium Chloride  1,000 ml @ 


 999 mls/hr  Q1H1M STAT


 IV  3/27/18 12:42


 3/27/18 13:42 DC 3/27/18 12:57


999 MLS/HR


 


 Ampicillin Sodium/


 Sulbactam Sodium


 3000 mg/Sodium


 Chloride  108 ml @ 


 200 mls/hr  NOW  STAT


 IV  3/27/18 12:42


 3/27/18 13:14 DC 3/27/18 13:01


200 MLS/HR


 


 Vancomycin HCl


 1500 mg/Sodium


 Chloride  530 ml @ 


 200 mls/hr  NOW  STAT


 IV  3/27/18 13:42


 3/27/18 16:20  3/27/18 13:45


200 MLS/HR











ED Course


ED COURSE: 


Vital signs were reviewed and showed Tachycardic


The patients medical record was reviewed


The above diagnostic studies were performed and reviewed.


ED treatments and interventions as stated above. 





1238: The patient was evaluated in room C1B. A complete history and physical 

examination was performed.





1317: I reviewed the patient's case with Dr. Mayen-Orthopedic.  He 

recommend bring the patient in for further management.





1335: I reviewed the patient's case with Mary Boo.  

She will evaluate the patient for further management.





1339: I updated the patient on the treatment plan. He is agreeable. 





1456: Upon reevaluation, the patient is resting comfortably.I discussed my 

findings with the patient and he understands and agrees with the treatment 

plan.   


Based on the patients age, coexisting illnesses, exam and lab findings the 

decision to treat as an outpatient was made.


The patient remained stable while under my care.


The patient will be evaluated for further management.





Medical Decision


Differential diagnosis:


Etiologies such as cellulitis, abscess, MRSA infection, DVT, necrotizing 

fasciitis, dermatitis, drug eruption, as well as others were entertained..








Patient is a 68-year-old male that presents to ER for right elbow pain.  He has 

a right olecranon bursitis along with cellulitis extending from his right wrist 

almost to his axilla.  CBC shows a leukocytosis of 16,000.  BMP along with LFTs

, bilirubin and troponin were negative.  Lactic acid was normal.  Ridging shows 

no acute fractures.  Discussed case with orthopedics internal medicine.  

Patient was given fluids and IV antibiotics.  He was admitted to internal 

medicine for olecranon bursitis associated with a cellulitis of the right upper 

extremity.





Medication Reconcilliation


Current Medication List:  was personally reviewed by me





Blood Pressure Screening


Patient's blood pressure:  Normal blood pressure





Consults


Time Called:  1320


Consulting Physician:  Mary Boo


Returned Call:  1335


I reviewed the patient's case with Mary Boo.  She 

will evaluate the patient for further management.


Additional Consults:  


   Time Called:  1310


   Consulted Physician:  Dr. Whitehead


   Returned Call:  1317


Additional Comments:


I reviewed the patient's case with Dr. Whitehead.  He recommend 

bring the patient in for further management.





Impression





 Primary Impression:  


 Sepsis


 Additional Impressions:  


 Olecranon bursitis


 Cellulitis of right elbow





Scribe Attestation


The scribe's documentation has been prepared under my direction and personally 

reviewed by me in its entirety. I confirm that the note above accurately 

reflects all work, treatment, procedures, and medical decision making performed 

by me.





Departure Information


Dispostion


Being Evaluated By Hospitalist





Referrals


No Doctor, Assigned (PCP)





Patient Instructions


My [facility]





Problem Qualifiers








 Primary Impression:  


 Sepsis


 Sepsis type:  sepsis due to unspecified organism  Qualified Codes:  A41.9 - 

Sepsis, unspecified organism


 Additional Impressions:  


 Olecranon bursitis


 Laterality:  right  Qualified Codes:  M70.21 - Olecranon bursitis, right elbow

## 2018-03-28 VITALS
HEART RATE: 86 BPM | SYSTOLIC BLOOD PRESSURE: 107 MMHG | OXYGEN SATURATION: 95 % | TEMPERATURE: 100.94 F | DIASTOLIC BLOOD PRESSURE: 53 MMHG

## 2018-03-28 VITALS
OXYGEN SATURATION: 98 % | SYSTOLIC BLOOD PRESSURE: 114 MMHG | HEART RATE: 70 BPM | DIASTOLIC BLOOD PRESSURE: 74 MMHG | TEMPERATURE: 98.06 F

## 2018-03-28 VITALS
TEMPERATURE: 98.42 F | OXYGEN SATURATION: 98 % | HEART RATE: 81 BPM | DIASTOLIC BLOOD PRESSURE: 86 MMHG | SYSTOLIC BLOOD PRESSURE: 130 MMHG

## 2018-03-28 VITALS — TEMPERATURE: 98.96 F

## 2018-03-28 VITALS — OXYGEN SATURATION: 98 %

## 2018-03-28 LAB
BUN SERPL-MCNC: 12 MG/DL (ref 7–18)
CALCIUM SERPL-MCNC: 8.5 MG/DL (ref 8.5–10.1)
CO2 SERPL-SCNC: 28 MMOL/L (ref 21–32)
CREAT SERPL-MCNC: 0.9 MG/DL (ref 0.6–1.4)
EOSINOPHIL NFR BLD AUTO: 183 K/UL (ref 130–400)
GLUCOSE SERPL-MCNC: 88 MG/DL (ref 70–99)
HCT VFR BLD CALC: 41 % (ref 42–52)
HGB BLD-MCNC: 14.3 G/DL (ref 14–18)
MCH RBC QN AUTO: 31.9 PG (ref 25–34)
MCHC RBC AUTO-ENTMCNC: 34.9 G/DL (ref 32–36)
MCV RBC AUTO: 91.5 FL (ref 80–100)
PMV BLD AUTO: 9.7 FL (ref 7.4–10.4)
POTASSIUM SERPL-SCNC: 3.9 MMOL/L (ref 3.5–5.1)
RED CELL DISTRIBUTION WIDTH CV: 13.5 % (ref 11.5–14.5)
RED CELL DISTRIBUTION WIDTH SD: 44.7 FL (ref 36.4–46.3)
SODIUM SERPL-SCNC: 139 MMOL/L (ref 136–145)
WBC # BLD AUTO: 17.34 K/UL (ref 4.8–10.8)

## 2018-03-28 PROCEDURE — 0M933ZX DRAINAGE OF RIGHT ELBOW BURSA AND LIGAMENT, PERCUTANEOUS APPROACH, DIAGNOSTIC: ICD-10-PCS | Performed by: ORTHOPAEDIC SURGERY

## 2018-03-28 RX ADMIN — ACETAMINOPHEN PRN MG: 325 TABLET ORAL at 15:45

## 2018-03-28 RX ADMIN — VANCOMYCIN HYDROCHLORIDE SCH MLS/HR: 1 INJECTION, POWDER, LYOPHILIZED, FOR SOLUTION INTRAVENOUS at 00:19

## 2018-03-28 RX ADMIN — VANCOMYCIN HYDROCHLORIDE SCH MLS/HR: 1 INJECTION, POWDER, LYOPHILIZED, FOR SOLUTION INTRAVENOUS at 12:07

## 2018-03-28 RX ADMIN — ACETAMINOPHEN PRN MG: 325 TABLET ORAL at 00:16

## 2018-03-28 RX ADMIN — PIPERACILLIN AND TAZOBACTAM SCH MLS/HR: 3; .375 INJECTION, POWDER, LYOPHILIZED, FOR SOLUTION INTRAVENOUS; PARENTERAL at 13:58

## 2018-03-28 RX ADMIN — CEFEPIME SCH MLS/MIN: 2 INJECTION, POWDER, FOR SOLUTION INTRAVENOUS at 22:00

## 2018-03-28 RX ADMIN — PIPERACILLIN AND TAZOBACTAM SCH MLS/HR: 3; .375 INJECTION, POWDER, LYOPHILIZED, FOR SOLUTION INTRAVENOUS; PARENTERAL at 06:06

## 2018-03-28 RX ADMIN — VANCOMYCIN HYDROCHLORIDE SCH MLS/HR: 1 INJECTION, POWDER, LYOPHILIZED, FOR SOLUTION INTRAVENOUS at 23:58

## 2018-03-28 NOTE — ORTHOPEDIC CONSULTATION
DATE OF CONSULTATION:  03/28/2018

 

The patient is seen in followup consultation.  Exam addressed to his right

elbow.  He still has cellulitis from the form area, posterior elbow area of

the medial arm with some lymphangitis.  So far, he has not had any major

improvement on IV vancomycin.  I did examine his olecranon bursa which had

some fluctuance, felt to have some fluid there.  So I did discuss aspirating

this with the patient.  He was agreeable with that.  I went ahead and

sterilely prepped the area with Betadine and then we placed 18 gauge needle

into the olecranon bursa.  I was able to aspirate 3 mL of clear bursal fluid.

 I did not see any evidence of cloudiness, any evidence of pus in the

olecranon bursa itself.  This was provided to the nurses to send to the lab

for stat Gram stain culture and sensitivity.  The patient is tentatively

placed n.p.o. for possible surgery if the patient does not show any

improvement on IV antibiotics.

## 2018-03-28 NOTE — ORTHOPEDIC PROGRESS NOTE
DATE: 03/28/2018

 

SUBJECTIVE:  The patient is a 68-year-old white male who was admitted for

possible septic olecranon bursitis yesterday.  He has been about 24 hours of

having IV antibiotics and he is currently lying in his bed.  He states that

he has not had much pain with the whole process but has been having fevers

off and on.  He states that he felt that he was getting a low grade fever at

the time of my visit.  He does not feel that the cellulitis has gone down

that much, but has noticed a little difference in the way the arm feels since

his admission.  He denies any numbness or tingling going down to the

extremity into the fingers and no overt pain at this time.

 

OBJECTIVE:

EXTREMITIES:  On examination of the patient's right upper extremity, he still

has moderate erythema of the forearm and up to the elbow and just above the

elbow at this time.  No open draining wounds at the elbow.  He has no

essential pain on palpation of the forearm or even no major pain at the

elbow.  He states he has some slight tenderness on palpation at the elbow, at

the olecranon bursa.  He has full range of motion of his arm at this time and

denies any pain.  There is some mild fluctuance noted at the olecranon bursa,

although the bursa itself is not overtly swollen.

 

ASSESSMENT:  Likely septic olecranon bursitis, slow to resolve.

 

PLAN:  Continue IV antibiotics at this time.  I will discuss with Dr. Campos

about possibly trying to aspirate the olecranon bursa this evening, if he

feels it is warranted, although the patient has been on 24 hours of IV

antibiotics.  Out of precaution, if he is no better by tomorrow, we will make

him n.p.o. after midnight tonight for possible irrigation and debridement of

the right olecranon bursa.

## 2018-03-28 NOTE — CLINICAL DOCUMENTATION QUERY
**** CLINICAL DOCUMENTATION QUERY****



Dr. TSAI, 



In your clinical opinion is this patient being managed for:

    

                        (x  ) systemic Sepsis due to infective/septic olecranon bursitis

                        (  ) Not Agree



                        (  ) Other explanation of clinical findings (Please Explain)

                        (  ) Unable to determine (Please Define)

                        (  ) Need to Discuss

                        



The medical record reflects the following clinical findings, treatment, and risk factors.  
  



Clinical Indicators: 69 yo male presenting with worsening elbow pain. Pt reported feeling 
like he couldn't get warm. Temp max 39.3, -113, WBC 16.65. H/P indicates pt meets 
SIRS criteria with a known infection source.

Treatment: 1L NSS bolus then continuous, IV ampicillin, IV vancomycin, blood cultures 
pending, IV zosyn, ortho consult

Risk Factors: infective/septic olecranon bursitis



Please clarify and document your clinical opinion in the progress notes and discharge 
summary. Terms such as "probable", "suspected", "likely", "questionable", "possible", or 
"still to be ruled out" are acceptable. 



*****IF IN AGREEMENT, YOU MUST DOCUMENT ABOVE DIAGNOSTIC STATEMENT IN DAILY PROGRESS NOTES 
AND DISCHARGE SUMMARY. This document is not part of the patient's record.*****

Thank You, Brunilda Henderson -[phone]

## 2018-03-29 VITALS
SYSTOLIC BLOOD PRESSURE: 123 MMHG | HEART RATE: 75 BPM | TEMPERATURE: 98.42 F | OXYGEN SATURATION: 95 % | DIASTOLIC BLOOD PRESSURE: 78 MMHG

## 2018-03-29 VITALS
HEART RATE: 77 BPM | DIASTOLIC BLOOD PRESSURE: 65 MMHG | OXYGEN SATURATION: 94 % | TEMPERATURE: 98.42 F | SYSTOLIC BLOOD PRESSURE: 128 MMHG

## 2018-03-29 VITALS
HEART RATE: 74 BPM | DIASTOLIC BLOOD PRESSURE: 75 MMHG | TEMPERATURE: 97.88 F | OXYGEN SATURATION: 98 % | SYSTOLIC BLOOD PRESSURE: 125 MMHG

## 2018-03-29 VITALS
SYSTOLIC BLOOD PRESSURE: 124 MMHG | OXYGEN SATURATION: 94 % | DIASTOLIC BLOOD PRESSURE: 80 MMHG | TEMPERATURE: 98.06 F | HEART RATE: 78 BPM

## 2018-03-29 LAB
CREAT SERPL-MCNC: 0.83 MG/DL (ref 0.6–1.4)
EOSINOPHIL NFR BLD AUTO: 212 K/UL (ref 130–400)
HCT VFR BLD CALC: 40.5 % (ref 42–52)
HGB BLD-MCNC: 14 G/DL (ref 14–18)
MCH RBC QN AUTO: 31.6 PG (ref 25–34)
MCHC RBC AUTO-ENTMCNC: 34.6 G/DL (ref 32–36)
MCV RBC AUTO: 91.4 FL (ref 80–100)
PMV BLD AUTO: 10.5 FL (ref 7.4–10.4)
RED CELL DISTRIBUTION WIDTH CV: 13.4 % (ref 11.5–14.5)
RED CELL DISTRIBUTION WIDTH SD: 44.8 FL (ref 36.4–46.3)
WBC # BLD AUTO: 15.45 K/UL (ref 4.8–10.8)

## 2018-03-29 PROCEDURE — 0MB30ZZ EXCISION OF RIGHT ELBOW BURSA AND LIGAMENT, OPEN APPROACH: ICD-10-PCS

## 2018-03-29 PROCEDURE — 3E0102A INTRODUCTION OF ANTI-INFECTIVE ENVELOPE INTO SUBCUTANEOUS TISSUE, OPEN APPROACH: ICD-10-PCS

## 2018-03-29 RX ADMIN — FENTANYL CITRATE PRN MCG: 50 INJECTION, SOLUTION INTRAMUSCULAR; INTRAVENOUS at 18:25

## 2018-03-29 RX ADMIN — FENTANYL CITRATE PRN MCG: 50 INJECTION, SOLUTION INTRAMUSCULAR; INTRAVENOUS at 18:13

## 2018-03-29 RX ADMIN — OXYCODONE HYDROCHLORIDE PRN MG: 5 TABLET ORAL at 21:54

## 2018-03-29 RX ADMIN — FENTANYL CITRATE PRN MCG: 50 INJECTION, SOLUTION INTRAMUSCULAR; INTRAVENOUS at 18:19

## 2018-03-29 RX ADMIN — ATORVASTATIN CALCIUM SCH MG: 10 TABLET, FILM COATED ORAL at 11:50

## 2018-03-29 RX ADMIN — Medication SCH TAB: at 11:50

## 2018-03-29 RX ADMIN — CEFEPIME SCH MLS/MIN: 2 INJECTION, POWDER, FOR SOLUTION INTRAVENOUS at 21:46

## 2018-03-29 RX ADMIN — CEFEPIME SCH MLS/MIN: 2 INJECTION, POWDER, FOR SOLUTION INTRAVENOUS at 10:12

## 2018-03-29 RX ADMIN — VANCOMYCIN HYDROCHLORIDE SCH MLS/HR: 1 INJECTION, POWDER, LYOPHILIZED, FOR SOLUTION INTRAVENOUS at 19:52

## 2018-03-29 RX ADMIN — VANCOMYCIN HYDROCHLORIDE SCH MLS/HR: 1 INJECTION, POWDER, LYOPHILIZED, FOR SOLUTION INTRAVENOUS at 12:43

## 2018-03-29 RX ADMIN — LEVOTHYROXINE SODIUM SCH MCG: 125 TABLET ORAL at 11:50

## 2018-03-29 RX ADMIN — FENTANYL CITRATE PRN MCG: 50 INJECTION, SOLUTION INTRAMUSCULAR; INTRAVENOUS at 18:08

## 2018-03-29 NOTE — PROGRESS NOTE
Internal Med Progress Note


Date of Service:


Mar 29, 2018.


Provider Documentation:





SUBJECTIVE:





still has significant swelling and pain of right upper extremity


NPO for possible procedure today


afebrile


no chest pain or sob


no nausea


ambulating in room ok











OBJECTIVE:





Vital Signs-as noted below





Exam:


General-alert and oriented. 


ENT-Normal hearing


Neck-no neck masses


Lungs-cta b/l no wheezing no  crackles present


Heart-S1 and S2 heard regular rate and rhythm no murmurs .


Abdomen-Soft bowel sounds present non tender no distension 


Extremities- erythema, tender and warmth of right upper extremity involving 

elbow arm and forearm


Neuro-alert and awake


moves extremities





Lab data as noted below.


ASSESSMENT & PLAN:


This is a 68-year-old male with a past medical history of hypothyroidism, HLD 

and history of renal stones who presents with worsening right elbow pain and 

swelling 2 days.








RUE cellulitis, possible septic olecranon bursitis:


possible sepsis. Presented with fever, tachycardia, leukocytosis


Trauma to R elbow ~ 6 weeks ago


currently on iv cefepime and iv vanco


orthopedics on board


continue same for now


s/p aspiration of olecranon bursa-await cx


possible I and D today


continue to monitor





Hypothyroidism:


on levothyroxine 





HLD:


on  statin 





DVT Ppx: SCDs 





Code status: FULL per discussion with patient 





PCP: Sejal 





DISPOSITION


to be determined


Vital Signs:











  Date Time  Temp Pulse Resp B/P (MAP) Pulse Ox O2 Delivery O2 Flow Rate FiO2


 


3/29/18 08:00      Room Air  


 


3/29/18 07:33 36.6 74 18 125/75 (92) 98 Room Air  


 


3/29/18 00:00      Room Air  


 


3/28/18 23:16 36.7 70 18 114/74 (87) 98 Room Air  


 


3/28/18 16:38 38.3 86 18 107/53 (71) 95 Room Air  


 


3/28/18 15:27     98 Room Air  








Lab Results:





Results Past 24 Hours








Test


  3/29/18


02:10 3/29/18


06:46 3/29/18


06:47 3/29/18


11:42 Range/Units


 


 


Urine Color YELLOW     


 


Urine Appearance CLEAR    CLEAR  


 


Urine pH 5.0    4.5-7.5  


 


Urine Specific Gravity 1.027    1.000-1.030  


 


Urine Protein NEG    NEG  


 


Urine Glucose (UA) NEG    NEG  


 


Urine Ketones TRACE    NEG  


 


Urine Occult Blood NEG    NEG  


 


Urine Nitrite NEG    NEG  


 


Urine Bilirubin NEG    NEG  


 


Urine Urobilinogen NEG    NEG  


 


Urine Leukocyte Esterase NEG    NEG  


 


Urine WBC (Auto) 0    0-5  /hpf


 


Urine RBC (Auto) 0-4    0-4  /hpf


 


Urine Hyaline Casts (Auto) 1-5    0-5  /lpf


 


Urine Epithelial Cells (Auto) 0-5    0-5  /lpf


 


Urine Bacteria (Auto) NEG    NEG  


 


White Blood Count  15.45   4.8-10.8  K/uL


 


Red Blood Count  4.43   4.7-6.1  M/uL


 


Hemoglobin  14.0   14.0-18.0  g/dL


 


Hematocrit  40.5   42-52  %


 


Mean Corpuscular Volume  91.4     fL


 


Mean Corpuscular Hemoglobin  31.6   25-34  pg


 


Mean Corpuscular Hemoglobin


Concent 


  34.6


  


  


  32-36  g/dl


 


 


RDW Standard Deviation  44.8   36.4-46.3  fL


 


RDW Coefficient of Variation  13.4   11.5-14.5  %


 


Platelet Count  212   130-400  K/uL


 


Mean Platelet Volume  10.5   7.4-10.4  fL


 


Creatinine


  


  


  0.83


  


  0.60-1.40


mg/dl


 


Est Creatinine Clear Calc


Drug Dose 


  


  85.5


  


   ml/min


 


 


Estimated GFR (


American) 


  


  103.3


  


   


 


 


Estimated GFR (Non-


American 


  


  89.2


  


   


 


 


Vancomycin Level Trough


  


  


  


  9.5


  SEE COMMENT


mcg/ml








Microbiology Results


3/28/18 Gram Stain - Final, Resulted


          


3/28/18 Bacterial Culture, Resulted


          Pending

## 2018-03-29 NOTE — PHARMACY PROGRESS NOTE
Pharmacy Abx Dose Short Note


Date of Service


Mar 29, 2018.





Assessment & Plan


Assessment


Mr. Colorado's trough came back, 9.2mcg/mL, prior to the 4th maintenance dose. 

Renal fxn continues to trend upward.  Will adjust interval from q12-->q8 set to 

start @ 2000 3/29/18. Trough ordered for 3/30/18 @1130 this will be prior to 

the 3rd maintenance dose. 








Pharmacy will continue to follow and will adjust dose/frequency as necessary.  

Thank you.

## 2018-03-29 NOTE — OPERATIVE REPORT
DATE OF OPERATION:  03/29/2018

 

PREOPERATIVE DIAGNOSIS:  Septic olecranon bursitis, right elbow.

 

POSTOPERATIVE DIAGNOSIS:  Septic olecranon bursitis, right elbow.

 

PROCEDURE:  Excisional debridement and culture septic olecranon bursitis,

right elbow.

 

SURGEON:  Dr. Byrne.

 

ANESTHESIA:  General.

 

COMPLICATIONS:  None.

 

DESCRIPTION OF PROCEDURE:  Following induction of adequate general

anesthesia, the patient's right arm was prepped and draped in usual sterile

manner.  The limb was exsanguinated with elevation only and the tourniquet

was inflated to 250 mmHg.  A longitudinal incision was made over the

olecranon, subcutaneous tissue was sharply dissected, electrocautery was used

for hemostasis.  An initial washout was carried out using about 100 mL of

pulsatile irrigation.  Thickened, multiloculated bursal tissue was noted, and

upon sharp dissection distally and radially, a pocket of purulence was

entered.  This was cultured.  This was the second set of cultures and initial

set prior to irrigation had been done.  The second set of cultures was

labeled abscess.  This was irrigated with pulsatile irrigation and then

continued sharp dissection was carried out until all visible and palpable

bursal tissue was removed.  A final irrigation of 3000 mL of pulsatile

irrigation with bacitracin was carried out.  The outer set of gloves was

removed.  The Stimulan beads with vancomycin that we prepared preoperatively

were administered and spread throughout the wound.  The incision was closed

using 3-0 nylon vertical mattress sutures.  A sterile dressing of 4 x 4s,

ABDs, sterile Webril and 4-inch Ace was applied.  The patient was awakened,

taken to recovery in stable and good condition.  He tolerated the procedure

well.

 

 

I attest to the content of the Intraoperative Record and any orders documented therein. Any exception
s are noted below.

## 2018-03-29 NOTE — ANESTHESIOLOGY PROGRESS NOTE
Anesthesia Post Op Note


Date & Time


Mar 29, 2018 at 18:21





Vital Signs


Pain Intensity:  5





Vital Signs Past 12 Hours








  Date Time  Temp Pulse Resp B/P (MAP) Pulse Ox O2 Delivery O2 Flow Rate FiO2


 


3/29/18 18:15  77 16 132/78 100 Oxymask 10 


 


3/29/18 18:05  70 12 126/69 98 Oxymask 10 


 


3/29/18 17:59 36.2 79 14 137/85 100 Oxymask 10 


 


3/29/18 08:00      Room Air  


 


3/29/18 07:33 36.6 74 18 125/75 (92) 98 Room Air  











Notes


Mental Status:  alert / awake / arousable, participated in evaluation


Pt Amnestic to Procedure:  Yes


Nausea / Vomiting:  adequately controlled


Pain:  adequately controlled


Airway Patency, RR, SpO2:  stable & adequate


BP & HR:  stable & adequate


Hydration State:  stable & adequate


Anesthetic Complications:  no major complications apparent

## 2018-03-29 NOTE — PROGRESS NOTE
Internal Med Progress Note


Date of Service:


Mar 28, 2018.


Provider Documentation:





SUBJECTIVE:





still right upper extremity  erythematous involving elbow fore arm and arm


has pain and tenderness


afebrile


no chest pain or sob


no cough











OBJECTIVE:





Vital Signs-as noted below





Exam:


General-alert and oriented. 


ENT-Normal hearing


Neck-no neck masses


Lungs-cta b/l no wheezing no  crackles present


Heart-S1 and S2 heard regular rate and rhythm no murmurs .


Abdomen-Soft bowel sounds present non tender no distension 


Extremities- erythema, tender and warmth of right upper extremity involving 

elbow arm and forearm


Neuro-alert and awake


moves extremities





Lab data as noted below.


ASSESSMENT & PLAN:


This is a 68-year-old male with a past medical history of hypothyroidism, HLD 

and history of renal stones who presents with worsening right elbow pain and 

swelling 2 days.








RUE cellulitis, possible septic olecranon bursitis:


possible sepsis. Presented with fever, tachycardia, leukocytosis


Trauma to R elbow ~ 6 weeks ago


on iv zosyn and iv vanco


orthopedics on board


continue same for now


await cx





Hypothyroidism:


on levothyroxine 





HLD:


on  statin 





DVT Ppx: SCDs 





Code status: FULL per discussion with patient 





PCP: Sejal 





DISPOSITION


to be determined


Vital Signs:











  Date Time  Temp Pulse Resp B/P (MAP) Pulse Ox O2 Delivery O2 Flow Rate FiO2


 


3/29/18 08:00      Room Air  


 


3/29/18 07:33 36.6 74 18 125/75 (92) 98 Room Air  


 


3/29/18 00:00      Room Air  


 


3/28/18 23:16 36.7 70 18 114/74 (87) 98 Room Air  


 


3/28/18 16:38 38.3 86 18 107/53 (71) 95 Room Air  


 


3/28/18 15:27     98 Room Air  








Lab Results:





Results Past 24 Hours








Test


  3/29/18


02:10 3/29/18


06:47 Range/Units


 


 


Urine Color YELLOW   


 


Urine Appearance CLEAR  CLEAR  


 


Urine pH 5.0  4.5-7.5  


 


Urine Specific Gravity 1.027  1.000-1.030  


 


Urine Protein NEG  NEG  


 


Urine Glucose (UA) NEG  NEG  


 


Urine Ketones TRACE  NEG  


 


Urine Occult Blood NEG  NEG  


 


Urine Nitrite NEG  NEG  


 


Urine Bilirubin NEG  NEG  


 


Urine Urobilinogen NEG  NEG  


 


Urine Leukocyte Esterase NEG  NEG  


 


Urine WBC (Auto) 0  0-5  /hpf


 


Urine RBC (Auto) 0-4  0-4  /hpf


 


Urine Hyaline Casts (Auto) 1-5  0-5  /lpf


 


Urine Epithelial Cells (Auto) 0-5  0-5  /lpf


 


Urine Bacteria (Auto) NEG  NEG  


 


Creatinine


  


  0.83


  0.60-1.40


mg/dl


 


Est Creatinine Clear Calc


Drug Dose 


  85.5


   ml/min


 


 


Estimated GFR (


American) 


  103.3


   


 


 


Estimated GFR (Non-


American 


  89.2


   


 








Microbiology Results


3/28/18 Gram Stain - Final, Resulted


          


3/28/18 Bacterial Culture, Resulted


          Pending

## 2018-03-30 VITALS
OXYGEN SATURATION: 97 % | HEART RATE: 66 BPM | TEMPERATURE: 98.06 F | DIASTOLIC BLOOD PRESSURE: 78 MMHG | SYSTOLIC BLOOD PRESSURE: 126 MMHG

## 2018-03-30 VITALS
HEART RATE: 66 BPM | SYSTOLIC BLOOD PRESSURE: 107 MMHG | DIASTOLIC BLOOD PRESSURE: 71 MMHG | OXYGEN SATURATION: 95 % | TEMPERATURE: 98.42 F

## 2018-03-30 VITALS
TEMPERATURE: 98.06 F | OXYGEN SATURATION: 98 % | DIASTOLIC BLOOD PRESSURE: 71 MMHG | HEART RATE: 62 BPM | SYSTOLIC BLOOD PRESSURE: 115 MMHG

## 2018-03-30 VITALS — OXYGEN SATURATION: 98 %

## 2018-03-30 LAB
ALBUMIN SERPL-MCNC: 2.6 GM/DL (ref 3.4–5)
ALP SERPL-CCNC: 59 U/L (ref 45–117)
ALT SERPL-CCNC: 17 U/L (ref 12–78)
AST SERPL-CCNC: 11 U/L (ref 15–37)
BASOPHILS # BLD: 0.02 K/UL (ref 0–0.2)
BASOPHILS NFR BLD: 0.1 %
BUN SERPL-MCNC: 14 MG/DL (ref 7–18)
CALCIUM SERPL-MCNC: 8.6 MG/DL (ref 8.5–10.1)
CO2 SERPL-SCNC: 25 MMOL/L (ref 21–32)
CREAT SERPL-MCNC: 0.65 MG/DL (ref 0.6–1.4)
EOS ABS #: 0.03 K/UL (ref 0–0.5)
EOSINOPHIL NFR BLD AUTO: 244 K/UL (ref 130–400)
GLUCOSE SERPL-MCNC: 100 MG/DL (ref 70–99)
HCT VFR BLD CALC: 38 % (ref 42–52)
HGB BLD-MCNC: 13.1 G/DL (ref 14–18)
IG#: 0.07 K/UL (ref 0–0.02)
IMM GRANULOCYTES NFR BLD AUTO: 7.2 %
LIPASE: 146 U/L (ref 73–393)
LYMPHOCYTES # BLD: 0.97 K/UL (ref 1.2–3.4)
MCH RBC QN AUTO: 31.2 PG (ref 25–34)
MCHC RBC AUTO-ENTMCNC: 34.5 G/DL (ref 32–36)
MCV RBC AUTO: 90.5 FL (ref 80–100)
MONO ABS #: 0.8 K/UL (ref 0.11–0.59)
MONOCYTES NFR BLD: 5.9 %
NEUT ABS #: 11.66 K/UL (ref 1.4–6.5)
NEUTROPHILS # BLD AUTO: 0.2 %
NEUTROPHILS NFR BLD AUTO: 86.1 %
PMV BLD AUTO: 9.7 FL (ref 7.4–10.4)
POTASSIUM SERPL-SCNC: 4.1 MMOL/L (ref 3.5–5.1)
PROT SERPL-MCNC: 6.1 GM/DL (ref 6.4–8.2)
RED CELL DISTRIBUTION WIDTH CV: 13.2 % (ref 11.5–14.5)
RED CELL DISTRIBUTION WIDTH SD: 43.4 FL (ref 36.4–46.3)
SODIUM SERPL-SCNC: 138 MMOL/L (ref 136–145)
WBC # BLD AUTO: 13.55 K/UL (ref 4.8–10.8)

## 2018-03-30 RX ADMIN — CEFEPIME SCH MLS/MIN: 2 INJECTION, POWDER, FOR SOLUTION INTRAVENOUS at 22:51

## 2018-03-30 RX ADMIN — VANCOMYCIN HYDROCHLORIDE SCH MLS/HR: 1 INJECTION, POWDER, LYOPHILIZED, FOR SOLUTION INTRAVENOUS at 04:08

## 2018-03-30 RX ADMIN — VANCOMYCIN HYDROCHLORIDE SCH MLS/HR: 1 INJECTION, POWDER, LYOPHILIZED, FOR SOLUTION INTRAVENOUS at 12:00

## 2018-03-30 RX ADMIN — Medication SCH TAB: at 08:11

## 2018-03-30 RX ADMIN — DOCUSATE SODIUM SCH MG: 100 CAPSULE, LIQUID FILLED ORAL at 08:11

## 2018-03-30 RX ADMIN — ATORVASTATIN CALCIUM SCH MG: 10 TABLET, FILM COATED ORAL at 08:11

## 2018-03-30 RX ADMIN — VANCOMYCIN HYDROCHLORIDE SCH MLS/HR: 1 INJECTION, POWDER, LYOPHILIZED, FOR SOLUTION INTRAVENOUS at 20:37

## 2018-03-30 RX ADMIN — CEFEPIME SCH MLS/MIN: 2 INJECTION, POWDER, FOR SOLUTION INTRAVENOUS at 10:18

## 2018-03-30 RX ADMIN — LEVOTHYROXINE SODIUM SCH MCG: 125 TABLET ORAL at 04:14

## 2018-03-30 NOTE — DIAGNOSTIC IMAGING REPORT
CT SCAN OF THE ABDOMEN AND PELVIS WITHOUT CONTRAST



CLINICAL HISTORY: flank pain    



COMPARISON STUDY:  9/10/2017 



TECHNIQUE: CT scan of the abdomen and pelvis was performed from the lung bases

to the proximal femurs. Images are reviewed in the axial, sagittal, and coronal

planes. IV contrast was not administered for this examination.  A dose lowering

technique was utilized adhering to the principles of ALARA.





CT DOSE: 479.96 mGy.cm

FINDINGS:



Lower chest: There are bibasal atelectatic changes



Liver: The unenhanced liver is normal in size, contour, and attenuation. There

is no intrahepatic biliary ductal dilatation.



Gallbladder: Unremarkable.



Spleen: Normal in size and attenuation.



Pancreas: Unremarkable.



Adrenal glands: Unremarkable.



Kidneys: No renal, ureteral, or bladder calculi are visualized. There is a 26 mm

right renal cyst.



Bowel: There are no transition zones indicate bowel obstruction. Appendix

appears normal. There is colonic diverticulosis. There are no acute

peridiverticular inflammatory changes.



Peritoneum: There is no intraperitoneal free air or abdominal ascites.



Vasculature: The abdominal aorta is normal in course and caliber.



Adenopathy: None.



Pelvic viscera: The prostate is enlarged measuring 71 mm transversely.



Skeletal structures: No destructive osseous lesions are seen.



IMPRESSION:  

1. No evidence of bowel obstruction. No evidence of free air

2. No evidence of acute diverticulitis. No evidence of acute appendicitis.

3. No renal, ureteral, or bladder calculi identified

4. Prostamegaly







Electronically signed by:  Jose A Candelario M.D.

3/30/2018 6:36 AM



Dictated Date/Time:  3/30/2018 6:33 AM

## 2018-03-30 NOTE — ORTHOPEDIC PROGRESS NOTE
Orthopedic Progress Note


Date of Service


Mar 30, 2018.





Subjective


Post OP Day:  1


Reports: feeling well





Objective


N/V intact (Fingers mobile), dressing C/D/I











  Date Time  Temp Pulse Resp B/P (MAP) Pulse Ox O2 Delivery O2 Flow Rate FiO2


 


3/30/18 15:00 36.7 66 20 126/78 (94) 97 Room Air  


 


3/30/18 08:00      Room Air  


 


3/30/18 07:34 36.7 62 18 115/71 (86) 98 Room Air  


 


3/30/18 03:23 36.9 66 18 107/71 (83) 95 Room Air  


 


3/30/18 00:00      Room Air  


 


3/29/18 23:20 36.9 75 18 123/78 (93) 95 Room Air  


 


3/29/18 20:52 36.7 78 18 124/80 (95) 94 Room Air  


 


3/29/18 20:10      Room Air  


 


3/29/18 19:45      Room Air  


 


3/29/18 19:17 36.9 77 18 128/65 (86) 94 Room Air  


 


3/29/18 18:50  78 16 121/68 94 Room Air  


 


3/29/18 18:35  75 16 118/66 94 Room Air  


 


3/29/18 18:25 37.0 86 18 131/77 95 Room Air  


 


3/29/18 18:15  77 16 132/78 100 Oxymask 10 


 


3/29/18 18:05  70 12 126/69 98 Oxymask 10 


 


3/29/18 17:59 36.2 79 14 137/85 100 Oxymask 10 








Laboratory Results 24 Hours:











Test


  3/30/18


05:19


 


White Blood Count 13.55 K/uL 


 


Red Blood Count 4.20 M/uL 


 


Hemoglobin 13.1 g/dL 


 


Hematocrit 38.0 % 


 


Mean Corpuscular Volume 90.5 fL 


 


Mean Corpuscular Hemoglobin 31.2 pg 


 


Mean Corpuscular Hemoglobin


Concent 34.5 g/dl 


 


 


Platelet Count 244 K/uL 


 


Mean Platelet Volume 9.7 fL 


 


Neutrophils (%) (Auto) 86.1 % 


 


Lymphocytes (%) (Auto) 7.2 % 


 


Monocytes (%) (Auto) 5.9 % 


 


Eosinophils (%) (Auto) 0.2 % 


 


Basophils (%) (Auto) 0.1 % 


 


Neutrophils # (Auto) 11.66 K/uL 


 


Lymphocytes # (Auto) 0.97 K/uL 


 


Monocytes # (Auto) 0.80 K/uL 


 


Eosinophils # (Auto) 0.03 K/uL 


 


Basophils # (Auto) 0.02 K/uL 








Additional Notes:


Preliminary cultures from OR 3/29 : S Aureus





Assessment & Plan


Assessment:


69 yo male stable POD #1 s/p I&D septic right olecranon bursa


Plan:


1.  Med management; cont IV vanco and cefepime


2.  D/C planning: await final cultures/sensitivities for appropriate abx

## 2018-03-30 NOTE — ANESTHESIOLOGY PROGRESS NOTE
Anesthesia Post Op Note


Date & Time


Mar 30, 2018 at 09:58





Vital Signs





Vital Signs Past 12 Hours








  Date Time  Temp Pulse Resp B/P (MAP) Pulse Ox O2 Delivery O2 Flow Rate FiO2


 


3/30/18 08:00      Room Air  


 


3/30/18 07:34 36.7 62 18 115/71 (86) 98 Room Air  


 


3/30/18 03:23 36.9 66 18 107/71 (83) 95 Room Air  


 


3/30/18 00:00      Room Air  


 


3/29/18 23:20 36.9 75 18 123/78 (93) 95 Room Air  











Notes


Mental Status:  alert / awake / arousable, participated in evaluation


Pt Amnestic to Procedure:  Yes


Nausea / Vomiting:  adequately controlled


Pain:  adequately controlled


Airway Patency, RR, SpO2:  stable & adequate


BP & HR:  stable & adequate


Hydration State:  stable & adequate


Anesthetic Complications:  no major complications apparent

## 2018-03-30 NOTE — PROGRESS NOTE
Internal Med Progress Note


Date of Service:


Mar 30, 2018.


Provider Documentation:





SUBJECTIVE:





s/p I and D of the right elbow


afebrile'


had back pain last night but ok now


no chest pain or sob


no nausea


otherwise doing fine











OBJECTIVE:





Vital Signs-as noted below





Exam:


General-alert and oriented. 


ENT-Normal hearing


Neck-no neck masses


Lungs-cta b/l no wheezing no  crackles present


Heart-S1 and S2 heard regular rate and rhythm no murmurs .


Abdomen-Soft bowel sounds present non tender no distension 


Extremities- s/p I and D of right elbow-in dressing


Neuro-alert and awake


moves extremities





Lab data as noted below.


ASSESSMENT & PLAN:


This is a 68-year-old male with a past medical history of hypothyroidism, HLD 

and history of renal stones who presents with worsening right elbow pain and 

swelling 2 days.








RUE cellulitis, possible septic olecranon bursitis:


possible sepsis. Presented with fever, tachycardia, leukocytosis


Trauma to R elbow ~ 6 weeks ago


currently on iv cefepime and iv vanco


orthopedics on board


continue same for now


s/p aspiration of olecranon bursa-await cx


possible I and D 3/29/18


initial cx staph


To continue current abx





Hypothyroidism:


on levothyroxine 





HLD:


on  statin 





DVT Ppx: SCDs 





Code status: FULL per discussion with patient 





PCP: Sejal 





DISPOSITION


possible d/c in 1-2 days


Vital Signs:











  Date Time  Temp Pulse Resp B/P (MAP) Pulse Ox O2 Delivery O2 Flow Rate FiO2


 


3/30/18 16:25     98 Room Air  


 


3/30/18 15:00 36.7 66 20 126/78 (94) 97 Room Air  


 


3/30/18 08:00      Room Air  


 


3/30/18 07:34 36.7 62 18 115/71 (86) 98 Room Air  


 


3/30/18 03:23 36.9 66 18 107/71 (83) 95 Room Air  


 


3/30/18 00:00      Room Air  


 


3/29/18 23:20 36.9 75 18 123/78 (93) 95 Room Air  


 


3/29/18 20:52 36.7 78 18 124/80 (95) 94 Room Air  


 


3/29/18 20:10      Room Air  


 


3/29/18 19:45      Room Air  


 


3/29/18 19:17 36.9 77 18 128/65 (86) 94 Room Air  


 


3/29/18 18:50  78 16 121/68 94 Room Air  


 


3/29/18 18:35  75 16 118/66 94 Room Air  


 


3/29/18 18:25 37.0 86 18 131/77 95 Room Air  


 


3/29/18 18:15  77 16 132/78 100 Oxymask 10 


 


3/29/18 18:05  70 12 126/69 98 Oxymask 10 


 


3/29/18 17:59 36.2 79 14 137/85 100 Oxymask 10 








Lab Results:





Results Past 24 Hours








Test


  3/30/18


05:19 3/30/18


05:30 3/30/18


11:22 Range/Units


 


 


White Blood Count 13.55   4.8-10.8  K/uL


 


Red Blood Count 4.20   4.7-6.1  M/uL


 


Hemoglobin 13.1   14.0-18.0  g/dL


 


Hematocrit 38.0   42-52  %


 


Mean Corpuscular Volume 90.5     fL


 


Mean Corpuscular Hemoglobin 31.2   25-34  pg


 


Mean Corpuscular Hemoglobin


Concent 34.5


  


  


  32-36  g/dl


 


 


Platelet Count 244   130-400  K/uL


 


Mean Platelet Volume 9.7   7.4-10.4  fL


 


Neutrophils (%) (Auto) 86.1    %


 


Lymphocytes (%) (Auto) 7.2    %


 


Monocytes (%) (Auto) 5.9    %


 


Eosinophils (%) (Auto) 0.2    %


 


Basophils (%) (Auto) 0.1    %


 


Neutrophils # (Auto) 11.66   1.4-6.5  K/uL


 


Lymphocytes # (Auto) 0.97   1.2-3.4  K/uL


 


Monocytes # (Auto) 0.80   0.11-0.59  K/uL


 


Eosinophils # (Auto) 0.03   0-0.5  K/uL


 


Basophils # (Auto) 0.02   0-0.2  K/uL


 


RDW Standard Deviation 43.4   36.4-46.3  fL


 


RDW Coefficient of Variation 13.2   11.5-14.5  %


 


Immature Granulocyte % (Auto) 0.5    %


 


Immature Granulocyte # (Auto) 0.07   0.00-0.02  K/uL


 


Sodium Level 138   136-145  mmol/L


 


Potassium Level 4.1   3.5-5.1  mmol/L


 


Chloride Level 107     mmol/L


 


Carbon Dioxide Level 25   21-32  mmol/L


 


Anion Gap 6.0   3-11  mmol/L


 


Blood Urea Nitrogen 14   7-18  mg/dl


 


Creatinine


  0.65


  


  


  0.60-1.40


mg/dl


 


Est Creatinine Clear Calc


Drug Dose 109.2


  


  


   ml/min


 


 


Estimated GFR (


American) 114.2


  


  


   


 


 


Estimated GFR (Non-


American 98.6


  


  


   


 


 


BUN/Creatinine Ratio 21.5   10-20  


 


Random Glucose 100   70-99  mg/dl


 


Calcium Level 8.6   8.5-10.1  mg/dl


 


Magnesium Level 2.2   1.8-2.4  mg/dl


 


Total Bilirubin 0.5   0.2-1  mg/dl


 


Aspartate Amino Transf


(AST/SGOT) 11


  


  


  15-37  U/L


 


 


Alanine Aminotransferase


(ALT/SGPT) 17


  


  


  12-78  U/L


 


 


Alkaline Phosphatase 59     U/L


 


Total Protein 6.1   6.4-8.2  gm/dl


 


Albumin 2.6   3.4-5.0  gm/dl


 


Globulin 3.5   2.5-4.0  gm/dl


 


Albumin/Globulin Ratio 0.7   0.9-2  


 


Lipase 146     U/L


 


Urine Color  YELLOW   


 


Urine Appearance  CLEAR  CLEAR  


 


Urine pH  5.0  4.5-7.5  


 


Urine Specific Gravity  1.022  1.000-1.030  


 


Urine Protein  NEG  NEG  


 


Urine Glucose (UA)  NEG  NEG  


 


Urine Ketones  NEG  NEG  


 


Urine Occult Blood  NEG  NEG  


 


Urine Nitrite  NEG  NEG  


 


Urine Bilirubin  NEG  NEG  


 


Urine Urobilinogen  NEG  NEG  


 


Urine Leukocyte Esterase  NEG  NEG  


 


Vancomycin Level Trough


  


  


  17.4


  SEE COMMENT


mcg/ml








Microbiology Results


3/29/18 Gram Stain - Final, Resulted


          


3/29/18 Bacterial Culture - Preliminary, Resulted


          NO GROWTH TO DATE.


3/29/18 Gram Stain - Final, Resulted


          


3/29/18 Bacterial Culture - Preliminary, Resulted


          Staphylococcus Aureus

## 2018-03-30 NOTE — PHARMACY PROGRESS NOTE
Pharmacy Abx Dose Short Note


Date of Service


Mar 30, 2018.





Assessment & Plan


Assessment





68 year old male receiving IV Vancomycin for treatment of septic joint (R elbow)


Day # 4 of antimicrobial therapy.








Plan





Vancomycin


* Trough level of 17.4 mcg/mL is therapeutic


* Continue dose of 1250 mg (15mg/kg) IV every 8 hours  


* Goal trough level for septic joint : 15 to 20 mcg/mL


* Trough level ordered for: 4/1 @ 0330 to ensure level remains within 

therapeutic range


* R elbow abscess growing Staph. aureus.  Waiting for sensitivity to determine 

possible de-escalation.








Pharmacy will continue to follow and will adjust dose/frequency as necessary.  

Thank you.

## 2018-03-31 VITALS
HEART RATE: 64 BPM | SYSTOLIC BLOOD PRESSURE: 144 MMHG | OXYGEN SATURATION: 95 % | DIASTOLIC BLOOD PRESSURE: 86 MMHG | TEMPERATURE: 98.24 F

## 2018-03-31 VITALS
TEMPERATURE: 98.24 F | HEART RATE: 66 BPM | OXYGEN SATURATION: 97 % | SYSTOLIC BLOOD PRESSURE: 124 MMHG | DIASTOLIC BLOOD PRESSURE: 82 MMHG

## 2018-03-31 VITALS
HEART RATE: 73 BPM | SYSTOLIC BLOOD PRESSURE: 148 MMHG | TEMPERATURE: 98.6 F | OXYGEN SATURATION: 95 % | DIASTOLIC BLOOD PRESSURE: 85 MMHG

## 2018-03-31 VITALS
OXYGEN SATURATION: 95 % | SYSTOLIC BLOOD PRESSURE: 122 MMHG | HEART RATE: 73 BPM | TEMPERATURE: 98.24 F | DIASTOLIC BLOOD PRESSURE: 78 MMHG

## 2018-03-31 VITALS — OXYGEN SATURATION: 97 %

## 2018-03-31 LAB
BASOPHILS # BLD: 0.06 K/UL (ref 0–0.2)
BASOPHILS NFR BLD: 0.6 %
BUN SERPL-MCNC: 11 MG/DL (ref 7–18)
CALCIUM SERPL-MCNC: 8.5 MG/DL (ref 8.5–10.1)
CO2 SERPL-SCNC: 27 MMOL/L (ref 21–32)
CREAT SERPL-MCNC: 0.73 MG/DL (ref 0.6–1.4)
EOS ABS #: 0.24 K/UL (ref 0–0.5)
EOSINOPHIL NFR BLD AUTO: 271 K/UL (ref 130–400)
GLUCOSE SERPL-MCNC: 88 MG/DL (ref 70–99)
HCT VFR BLD CALC: 38.6 % (ref 42–52)
HGB BLD-MCNC: 13.4 G/DL (ref 14–18)
IG#: 0.21 K/UL (ref 0–0.02)
IMM GRANULOCYTES NFR BLD AUTO: 15.3 %
LYMPHOCYTES # BLD: 1.6 K/UL (ref 1.2–3.4)
MCH RBC QN AUTO: 31.4 PG (ref 25–34)
MCHC RBC AUTO-ENTMCNC: 34.7 G/DL (ref 32–36)
MCV RBC AUTO: 90.4 FL (ref 80–100)
MONO ABS #: 1.13 K/UL (ref 0.11–0.59)
MONOCYTES NFR BLD: 10.8 %
NEUT ABS #: 7.2 K/UL (ref 1.4–6.5)
NEUTROPHILS # BLD AUTO: 2.3 %
NEUTROPHILS NFR BLD AUTO: 69 %
PMV BLD AUTO: 9.5 FL (ref 7.4–10.4)
POTASSIUM SERPL-SCNC: 3.9 MMOL/L (ref 3.5–5.1)
RED CELL DISTRIBUTION WIDTH CV: 13.3 % (ref 11.5–14.5)
RED CELL DISTRIBUTION WIDTH SD: 44 FL (ref 36.4–46.3)
SODIUM SERPL-SCNC: 139 MMOL/L (ref 136–145)
WBC # BLD AUTO: 10.44 K/UL (ref 4.8–10.8)

## 2018-03-31 PROCEDURE — 05HC33Z INSERTION OF INFUSION DEVICE INTO LEFT BASILIC VEIN, PERCUTANEOUS APPROACH: ICD-10-PCS | Performed by: HOSPITALIST

## 2018-03-31 RX ADMIN — Medication SCH TAB: at 09:13

## 2018-03-31 RX ADMIN — OXYCODONE HYDROCHLORIDE PRN MG: 5 TABLET ORAL at 03:53

## 2018-03-31 RX ADMIN — ACETAMINOPHEN PRN MG: 325 TABLET ORAL at 15:04

## 2018-03-31 RX ADMIN — ATORVASTATIN CALCIUM SCH MG: 10 TABLET, FILM COATED ORAL at 09:13

## 2018-03-31 RX ADMIN — VANCOMYCIN HYDROCHLORIDE SCH MLS/HR: 1 INJECTION, POWDER, LYOPHILIZED, FOR SOLUTION INTRAVENOUS at 03:48

## 2018-03-31 RX ADMIN — DOCUSATE SODIUM SCH MG: 100 CAPSULE, LIQUID FILLED ORAL at 09:13

## 2018-03-31 RX ADMIN — LEVOTHYROXINE SODIUM SCH MCG: 125 TABLET ORAL at 03:48

## 2018-03-31 RX ADMIN — DAPTOMYCIN SCH MLS/MIN: 50 INJECTION, POWDER, LYOPHILIZED, FOR SOLUTION INTRAVENOUS at 10:14

## 2018-03-31 NOTE — MEDICAL CONSULT
Consultation


Date of Consultation:


Mar 31, 2018.


Attending Physician:


Bill Reynolds MD


Reason for Consultation:


Septic olecranon bursitis


History of Present Illness


68-year-old male with history of hyperlipidemia, hypothyroidism, DJD, otherwise 

in good health, was well until approximately 5-6 weeks prior to admission when 

he fell hitting his right elbow.  Over the next several weeks, noted some 

intermittent swelling, but did not seek care until 2 days prior to admission 

when he had abrupt onset of progressively worsening swelling, redness, and pain 

associated with fever and chills.  He was found to have evidence of septic 

olecranon bursitis, and has now undergone surgical debridement.  Operative 

cultures now growing MRSA.  Patient has been treated with vancomycin and Zosyn.

  Blood cultures have been negative.  Patient feeling significantly better, 

fever has resolved.  Has been tolerating antibiotics without apparent 

difficulty.





Past Medical/Surgical History


Medical Problems:


(1) Cellulitis of right elbow


Status: Acute  





(2) Kidney stone


Status: Acute  





(3) Olecranon bursitis


Status: Acute  





(4) Renal colic


Status: Acute  





(5) Right flank pain


Status: Acute  





(6) Sepsis


Status: Acute  





Medical Problems:


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


(2) HLD (hyperlipidemia)


(3) Hypothyroidism


(4) Renal colic


Surgical Problems:


(1) History of total bilateral knee replacement











Family History





FH: CHF (congestive heart failure)


  SISTER


FH: HTN (hypertension)


  BROTHER


  SISTER


No significant history


  FATHER


  MOTHER





Social History


Smoking Status:  Never Smoker


Alcohol Use:  socially (2 drinks every evening (wine) )


Marital Status:  single, 


Housing Status:  lives with family


Occupation Status:  retired





Allergies


Coded Allergies:  


     No Known Allergies (Unverified , 3/27/18)





Current Inpatient Medications





Current Inpatient Medications








 Medications


  (Trade)  Dose


 Ordered  Sig/Mera


 Route  Start Time


 Stop Time Status Last Admin


Dose Admin


 


 Acetaminophen


  (Tylenol Tab)  650 mg  Q4H  PRN


 PO  3/27/18 14:45


 18 14:44  3/28/18 15:45


650 MG


 


 Atorvastatin


 Calcium


  (Lipitor Tab)  10 mg  QAM


 PO  3/29/18 11:00


 18 10:59 Future Hold 3/31/18 09:13


10 MG


 


 Levothyroxine


 Sodium


  (Synthroid Tab)  125 mcg  DAILYBB


 PO  3/29/18 11:00


 18 10:59  3/31/18 03:48


125 MCG


 


 Multivitamins/


 Minerals


  (Multivitamin W/


 Minerals Tab)  1 tab  QAM


 PO  3/29/18 11:00


 18 10:59  3/31/18 09:13


1 TAB


 


 Oxycodone HCl


  (Roxicodone


 Immediate Rel Tab)  `1-2 tabs


 for pain 1


 tab ...  Q6H  PRN


 PO  3/29/18 17:45


 18 17:44  3/31/18 03:53


5 MG


 


 Morphine Sulfate


  (MoRPHine


 SULFATE INJ)  2 mg  Q2HWA  PRN


 IV  3/29/18 17:45


 18 17:44   


 


 


 Morphine Sulfate


  (MoRPHine


 SULFATE INJ)  4 mg  Q2HWA  PRN


 IV  3/29/18 18:15


 18 18:14   


 


 


 Docusate Sodium


  (coLACE CAP)  100 mg  DAILY


 PO  3/30/18 08:00


 18 07:59  3/31/18 09:13


100 MG


 


 Daptomycin


  (Consult)  1 ea  UD  PRN


 N/A  3/31/18 09:45


 18 09:44   


 


 


 Daptomycin 450 mg/


 Syringe  9 ml @ 4.5


 mls/min  Q24H


 IV  3/31/18 10:00


 18 09:59  3/31/18 10:14


4.5 MLS/MIN











Review of Systems


All systems were reviewed and are negative except as per HPI





Physical Exam











  Date Time  Temp Pulse Resp B/P (MAP) Pulse Ox O2 Delivery O2 Flow Rate FiO2


 


3/31/18 07:19 36.8 66 18 124/82 (96) 97 Room Air  


 


3/31/18 00:27 36.8 73 17 122/78 (93) 95 Room Air  


 


3/31/18 00:00      Room Air  


 


3/30/18 16:25     98 Room Air  


 


3/30/18 15:00 36.7 66 20 126/78 (94) 97 Room Air  








General Appearance:  WD/WN, no apparent distress


Head:  normocephalic, atraumatic


Eyes:  normal inspection, EOMI, sclerae normal


ENT:  normal ENT inspection, hearing grossly normal, pharynx normal


Neck:  supple, no adenopathy, thyroid normal, trachea midline


Respiratory/Chest:  chest non-tender, lungs clear, normal breath sounds, no 

respiratory distress


Cardiovascular:  regular rate, rhythm, no gallop, no murmur


Abdomen/GI:  normal bowel sounds, non tender, soft, no organomegaly


Back:  normal inspection, no CVA tenderness


Extremities/Musculoskelatal:  no calf tenderness, normal capillary refill, 

pelvis stable


Neurologic/Psych:  alert, normal mood/affect, oriented x 3


Skin:  normal color, warm/dry, no rash, + pertinent finding (Surgical dressing 

intact, erythema of arm improved)


Lymphatic:  no adenopathy





Laboratory Results


------------------------





RUN DATE: 18                [facility] LAB             

    PAGE 1   


RUN TIME: 946                            Specimen Inquiry                    


--------------------------------------------------------------------------------

------------





PATIENT: GOVIND MACHUCA               ACCT #: [id number] LOC:  [id number]     U #

: [id number]


                                       AGE/SX: 68/M         ROOM: [facility]       REG

: 18


REG DR:  Bill Reynolds MD       :    1950   BED:  1          DIS

:         


                                       STATUS: ADM IN       TLOC:           


--------------------------------------------------------------------------------

------------








SPEC #: [id number]        ESTEFANIA:      STATUS:  RES            REQ 

#: [id number]


                            RECD:      SUBM DR: Bill Reynolds MD       


SOURCE: ABSCESS             ENTR:      [facility] DR: Dev Post M.D.

            


[facility]: Juan Garcia MD Schreckengost, Janea ., River Ahumada MD


ORDERED:  AER/CANDICE CULTSMR                                                      

   


COMMENTS: CULTURE #2                                                  


--------------------------------------------------------------------------------

------------





  Procedure                         Result                         Verified    

       Site


--------------------------------------------------------------------------------

------------





  GRAM STAIN  Final                                                


        RESULT                      MANY WBCs SEEN


                                    FEW GRAM POSITIVE COCCI





  OR AER/CANDICE CULT  Preliminary                                     


     Organism 1                     STAPH. AUREUS MRSA


        QUANITY                     FEW


        SENS                        SENSITIVITY TO FOLLOW





        SENSITIVITY RESULT INDICATES A METHICILLIN RESISTANT STAPH.


        AUREUS.  PHONED TO BETTY WEINSTEIN ON 18 AT 0724 BY Andre Delatorre.


        Results were verbalized back to CLAUDE.


        


        RESULTS WERE ALSO CALLED TO [facility]


        INFECTION CONTROL ANSWERING MACHINE ON 18 BY CLAUDE.


        


        [phone]





     1. STAPH. AUREUS MRSA


                       Target Route Dose               RX     AB   Cost M.I.C. 

   IQ    


                       ------ ----- ------------------ ------ -- ------ --------

- ------


       TRIMET/SULFA                                    S                <=0.5/

9.5       


     * OXACILLIN                                       R                >2     

         


       VANCOMYCIN                                      S                2      

         


       ERYTHROMYCIN                                    R                >4     

         


       TETRACYCLINE                                    S                <=4    

         


       CLINDAMYCIN                                     S                <=0.5  

         


       DAPTOMYCIN                                      S                <=0.5  

         


       RIFAMPIN                                        S                <=1    

         





         S = SENSITIVE  I = INTERMEDIATE  R = RESISTANT





--------------------------------------------------------------------------------

------------

















                                   ** END OF REPORT **     


Last 24 Hours








Test


  3/30/18


11:22 3/31/18


05:56


 


Vancomycin Level Trough 17.4 mcg/ml  


 


White Blood Count  10.44 K/uL 


 


Red Blood Count  4.27 M/uL 


 


Hemoglobin  13.4 g/dL 


 


Hematocrit  38.6 % 


 


Mean Corpuscular Volume  90.4 fL 


 


Mean Corpuscular Hemoglobin  31.4 pg 


 


Mean Corpuscular Hemoglobin


Concent 


  34.7 g/dl 


 


 


Platelet Count  271 K/uL 


 


Mean Platelet Volume  9.5 fL 


 


Neutrophils (%) (Auto)  69.0 % 


 


Lymphocytes (%) (Auto)  15.3 % 


 


Monocytes (%) (Auto)  10.8 % 


 


Eosinophils (%) (Auto)  2.3 % 


 


Basophils (%) (Auto)  0.6 % 


 


Neutrophils # (Auto)  7.20 K/uL 


 


Lymphocytes # (Auto)  1.60 K/uL 


 


Monocytes # (Auto)  1.13 K/uL 


 


Eosinophils # (Auto)  0.24 K/uL 


 


Basophils # (Auto)  0.06 K/uL 


 


RDW Standard Deviation  44.0 fL 


 


RDW Coefficient of Variation  13.3 % 


 


Immature Granulocyte % (Auto)  2.0 % 


 


Immature Granulocyte # (Auto)  0.21 K/uL 


 


Sodium Level  139 mmol/L 


 


Potassium Level  3.9 mmol/L 


 


Chloride Level  106 mmol/L 


 


Carbon Dioxide Level  27 mmol/L 


 


Anion Gap  6.0 mmol/L 


 


Blood Urea Nitrogen  11 mg/dl 


 


Creatinine  0.73 mg/dl 


 


Est Creatinine Clear Calc


Drug Dose 


  97.2 ml/min 


 


 


Estimated GFR (


American) 


  108.9 


 


 


Estimated GFR (Non-


American 


  94.0 


 


 


BUN/Creatinine Ratio  15.2 


 


Random Glucose  88 mg/dl 


 


Calcium Level  8.5 mg/dl 


 


Magnesium Level  2.1 mg/dl 











Assessment & Plan


68-year-old male with septic right olecranon bursitis with MRSA now status post 

incision and drainage.  Would recommend 1 week of IV daptomycin followed by 1-2 

weeks of oral Bactrim.  Case discussed with Orthopedics.  Will follow.

## 2018-03-31 NOTE — PROGRESS NOTE
Internal Med Progress Note


Date of Service:


Mar 31, 2018.


Provider Documentation:





SUBJECTIVE:





s/p I and D of the right elbow


erythema of right upper ext much improved


afebrile


ambulating fine


has some back pain on and off


no sob








OBJECTIVE:





Vital Signs-as noted below





Exam:


General-alert and oriented. 


ENT-Normal hearing


Neck-no neck masses


Lungs-cta b/l no wheezing no  crackles present


Heart-S1 and S2 heard regular rate and rhythm no murmurs .


Abdomen-Soft bowel sounds present non tender no distension 


Extremities- s/p I and D of right elbow-in dressing


Neuro-alert and awake


moves extremities





Lab data as noted below.


ASSESSMENT & PLAN:


This is a 68-year-old male with a past medical history of hypothyroidism, HLD 

and history of renal stones who presents with worsening right elbow pain and 

swelling 2 days.








RUE cellulitis, possible septic olecranon bursitis:


possible sepsis. Presented with fever, tachycardia, leukocytosis


Trauma to R elbow ~ 6 weeks ago


was on iv cefepime and iv vanco


orthopedics on board


continue same for now


s/p aspiration of olecranon bursa-await cx


possible I and D 3/29/18


cx mrsa


plan for one week of iv abx and then po abx for 1-2 weeks as per ID


plan for picc line


abx changed to iv daptomycin





Hypothyroidism:


on levothyroxine 





HLD:


on  statin 





DVT Ppx: SCDs 





Code status: FULL per discussion with patient 





PCP: Sejal 





DISPOSITION


possible d/c in am


Vital Signs:











  Date Time  Temp Pulse Resp B/P (MAP) Pulse Ox O2 Delivery O2 Flow Rate FiO2


 


3/31/18 15:16 37.0 73 18 148/85 (106) 95 Room Air  


 


3/31/18 08:00     97 Room Air  


 


3/31/18 07:19 36.8 66 18 124/82 (96) 97 Room Air  


 


3/31/18 00:27 36.8 73 17 122/78 (93) 95 Room Air  


 


3/31/18 00:00      Room Air  








Lab Results:





Results Past 24 Hours








Test


  3/31/18


05:56 Range/Units


 


 


White Blood Count 10.44 4.8-10.8  K/uL


 


Red Blood Count 4.27 4.7-6.1  M/uL


 


Hemoglobin 13.4 14.0-18.0  g/dL


 


Hematocrit 38.6 42-52  %


 


Mean Corpuscular Volume 90.4   fL


 


Mean Corpuscular Hemoglobin 31.4 25-34  pg


 


Mean Corpuscular Hemoglobin


Concent 34.7


  32-36  g/dl


 


 


Platelet Count 271 130-400  K/uL


 


Mean Platelet Volume 9.5 7.4-10.4  fL


 


Neutrophils (%) (Auto) 69.0  %


 


Lymphocytes (%) (Auto) 15.3  %


 


Monocytes (%) (Auto) 10.8  %


 


Eosinophils (%) (Auto) 2.3  %


 


Basophils (%) (Auto) 0.6  %


 


Neutrophils # (Auto) 7.20 1.4-6.5  K/uL


 


Lymphocytes # (Auto) 1.60 1.2-3.4  K/uL


 


Monocytes # (Auto) 1.13 0.11-0.59  K/uL


 


Eosinophils # (Auto) 0.24 0-0.5  K/uL


 


Basophils # (Auto) 0.06 0-0.2  K/uL


 


RDW Standard Deviation 44.0 36.4-46.3  fL


 


RDW Coefficient of Variation 13.3 11.5-14.5  %


 


Immature Granulocyte % (Auto) 2.0  %


 


Immature Granulocyte # (Auto) 0.21 0.00-0.02  K/uL


 


Sodium Level 139 136-145  mmol/L


 


Potassium Level 3.9 3.5-5.1  mmol/L


 


Chloride Level 106   mmol/L


 


Carbon Dioxide Level 27 21-32  mmol/L


 


Anion Gap 6.0 3-11  mmol/L


 


Blood Urea Nitrogen 11 7-18  mg/dl


 


Creatinine


  0.73


  0.60-1.40


mg/dl


 


Est Creatinine Clear Calc


Drug Dose 97.2


   ml/min


 


 


Estimated GFR (


American) 108.9


   


 


 


Estimated GFR (Non-


American 94.0


   


 


 


BUN/Creatinine Ratio 15.2 10-20  


 


Random Glucose 88 70-99  mg/dl


 


Calcium Level 8.5 8.5-10.1  mg/dl


 


Magnesium Level 2.1 1.8-2.4  mg/dl

## 2018-03-31 NOTE — ORTHOPEDIC PROGRESS NOTE
Orthopedic Progress Note


Date of Service


Mar 31, 2018.





Subjective


Post OP Day:  2


Reports: feeling well





Objective


N/V intact, incision C/D/I (Dressing changed, mild bloody drainage, no 

signicant erythema)











  Date Time  Temp Pulse Resp B/P (MAP) Pulse Ox O2 Delivery O2 Flow Rate FiO2


 


3/31/18 07:19 36.8 66 18 124/82 (96) 97 Room Air  


 


3/31/18 00:27 36.8 73 17 122/78 (93) 95 Room Air  


 


3/31/18 00:00      Room Air  


 


3/30/18 16:25     98 Room Air  


 


3/30/18 15:00 36.7 66 20 126/78 (94) 97 Room Air  








Laboratory Results 24 Hours:











Test


  3/31/18


05:56


 


White Blood Count 10.44 K/uL 


 


Red Blood Count 4.27 M/uL 


 


Hemoglobin 13.4 g/dL 


 


Hematocrit 38.6 % 


 


Mean Corpuscular Volume 90.4 fL 


 


Mean Corpuscular Hemoglobin 31.4 pg 


 


Mean Corpuscular Hemoglobin


Concent 34.7 g/dl 


 


 


Platelet Count 271 K/uL 


 


Mean Platelet Volume 9.5 fL 


 


Neutrophils (%) (Auto) 69.0 % 


 


Lymphocytes (%) (Auto) 15.3 % 


 


Monocytes (%) (Auto) 10.8 % 


 


Eosinophils (%) (Auto) 2.3 % 


 


Basophils (%) (Auto) 0.6 % 


 


Neutrophils # (Auto) 7.20 K/uL 


 


Lymphocytes # (Auto) 1.60 K/uL 


 


Monocytes # (Auto) 1.13 K/uL 


 


Eosinophils # (Auto) 0.24 K/uL 


 


Basophils # (Auto) 0.06 K/uL 








Additional Notes:


Cultures: MRSA





Assessment & Plan


Assessment:


67 yo male stable POD #2 s/p I&D septic right olecranon bursa, MRSA on culture


Plan:


1.  Med management; cont IV vanco and cefepime, consider PICC line and ID 

consult


2.  D/C planning: when decision made regarding PICC line and IV abx

## 2018-04-01 VITALS
SYSTOLIC BLOOD PRESSURE: 142 MMHG | TEMPERATURE: 98.24 F | DIASTOLIC BLOOD PRESSURE: 86 MMHG | OXYGEN SATURATION: 95 % | HEART RATE: 72 BPM

## 2018-04-01 VITALS
DIASTOLIC BLOOD PRESSURE: 86 MMHG | OXYGEN SATURATION: 95 % | HEART RATE: 72 BPM | SYSTOLIC BLOOD PRESSURE: 142 MMHG | TEMPERATURE: 98.24 F

## 2018-04-01 VITALS — OXYGEN SATURATION: 95 %

## 2018-04-01 LAB
BUN SERPL-MCNC: 11 MG/DL (ref 7–18)
CALCIUM SERPL-MCNC: 9.1 MG/DL (ref 8.5–10.1)
CO2 SERPL-SCNC: 27 MMOL/L (ref 21–32)
CREAT SERPL-MCNC: 0.77 MG/DL (ref 0.6–1.4)
EOSINOPHIL NFR BLD AUTO: 253 K/UL (ref 130–400)
GLUCOSE SERPL-MCNC: 92 MG/DL (ref 70–99)
HCT VFR BLD CALC: 41.2 % (ref 42–52)
HGB BLD-MCNC: 14.2 G/DL (ref 14–18)
MCH RBC QN AUTO: 31.1 PG (ref 25–34)
MCHC RBC AUTO-ENTMCNC: 34.5 G/DL (ref 32–36)
MCV RBC AUTO: 90.4 FL (ref 80–100)
PMV BLD AUTO: 9.8 FL (ref 7.4–10.4)
POTASSIUM SERPL-SCNC: 4 MMOL/L (ref 3.5–5.1)
RED CELL DISTRIBUTION WIDTH CV: 13.2 % (ref 11.5–14.5)
RED CELL DISTRIBUTION WIDTH SD: 43.5 FL (ref 36.4–46.3)
SODIUM SERPL-SCNC: 137 MMOL/L (ref 136–145)
WBC # BLD AUTO: 10.38 K/UL (ref 4.8–10.8)

## 2018-04-01 RX ADMIN — Medication SCH TAB: at 08:37

## 2018-04-01 RX ADMIN — LEVOTHYROXINE SODIUM SCH MCG: 125 TABLET ORAL at 06:24

## 2018-04-01 RX ADMIN — DAPTOMYCIN SCH MLS/MIN: 50 INJECTION, POWDER, LYOPHILIZED, FOR SOLUTION INTRAVENOUS at 10:32

## 2018-04-01 RX ADMIN — DOCUSATE SODIUM SCH MG: 100 CAPSULE, LIQUID FILLED ORAL at 08:37

## 2018-04-01 NOTE — ORTHOPEDIC PROGRESS NOTE
Orthopedic Progress Note


Date of Service


Apr 1, 2018.





Subjective


Post OP Day:  3


Reports: feeling well





Objective


N/V intact, incision C/D/I (Minimal bloody drainage)











  Date Time  Temp Pulse Resp B/P (MAP) Pulse Ox O2 Delivery O2 Flow Rate FiO2


 


4/1/18 07:11 36.8 72 18 142/86 (104) 95 Room Air  


 


4/1/18 00:00      Room Air  


 


3/31/18 22:23 36.8 64 18 144/86 (105) 95 Room Air  


 


3/31/18 20:00      Room Air  


 


3/31/18 16:00     97 Room Air  


 


3/31/18 15:16 37.0 73 18 148/85 (106) 95 Room Air  








Laboratory Results 24 Hours:











Test


  4/1/18


05:41


 


White Blood Count 10.38 K/uL 


 


Red Blood Count 4.56 M/uL 


 


Hemoglobin 14.2 g/dL 


 


Hematocrit 41.2 % 


 


Mean Corpuscular Volume 90.4 fL 


 


Mean Corpuscular Hemoglobin 31.1 pg 


 


Mean Corpuscular Hemoglobin


Concent 34.5 g/dl 


 


 


Platelet Count 253 K/uL 


 


Mean Platelet Volume 9.8 fL 











Assessment & Plan


Assessment:


69 yo male stable POD #3 s/p I&D septic right olecranon bursa, MRSA on culture, 

abx changed to Dapto, PICC line in place


Plan:


1.  Med management


2.  D/C planning: d/c when  has arranged home/outpt IV abx

## 2018-04-01 NOTE — DISCHARGE SUMMARY
Discharge Summary


Date of Service


Apr 1, 2018.





Discharge Summary


Admission Date:


Mar 27, 2018 at 14:32


Discharge Date:  Apr 1, 2018


Discharge Disposition:  Home with services


Principal Diagnosis:


RT SEPTIC OLECRANON BURSITIS S/P I AND D


Secondary Diagnoses/Problems:


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


Status: Chronic  





(2) HLD (hyperlipidemia)


Status: Chronic  





(3) Hypothyroidism


Status: Chronic  





(4) Renal colic


Status: Chronic


Procedures:


CXR:


1.  No acute cardiopulmonary disease.





ELBOW XRAY:


1. No acute fracture or joint effusion of the right elbow.





2. No radiographic evidence of osteomyelitis.





3. Diffuse right elbow soft tissue swelling, most pronounced overlying the


olecranon.





CT ABD/PELVIS:


1. No evidence of bowel obstruction. No evidence of free air


2. No evidence of acute diverticulitis. No evidence of acute appendicitis.


3. No renal, ureteral, or bladder calculi identified


4. Prostamegaly


Consultations:


ORTHO


ID





Medication Reconciliation


New Medications:  


Daptomycin (Daptomycin) 500 Mg Inj


450 MG IV DAILY for 4 Days





Lactobacillus (Acidophilus) 1 Cap Cap


2 CAP PO BID for 20 Days





Sulfamethoxazole-Trimethoprim (Bactrim Ds 800MG/160MG) 1 Tab Tab


1 TAB PO BID for 14 Days, #28 TAB





 


Continued Medications:  


Aspirin (Aspirin Chewable) 81 Mg Chew


81 MG PO QAM





Atorvastatin (Lipitor) 10 Mg Tab


10 MG PO QAM





Levothyroxine Sodium (Levothyroxine Sodium) 125 Mcg Tab


125 MCG PO QAM





Melatonin-Pyridoxine (Melatonin) 1 Tab Tab


200 MCG PO HS PRN for Sleep





Multivitamins/Minerals (Mvi With Minerals)  Tab


1 TAB PO QAM











Admission Information


HPI (per Admitting provider):


This is a 68-year-old male with a past medical history of hypothyroidism, HLD 

and history of renal stones who presents with worsening right elbow pain and 

swelling 2 days.  Patient was carrying plywood up a flight of stairs ~6 weeks 

ago when he lost his balance and fell into the wall, hitting his elbow.  Has 

experienced pain and tenderness since then but has been able to continue daily 

routine including working on his house, so was not too concerned.  Started to 

notice some redness to his elbow earlier this week but as of 2 days ago, 

redness had spread down the forearm with associated warmth.  Went running this 

morning and once he returned, he felt unable to get warm.  Was seen by PCP and 

had a low grade fever ~100 degree F and was sent over to the ED with concern 

for septic joint.  Patient denies any lightheadedness, visual changes, confusion

, chest pain, shortness of breath, abdominal pain, nausea, vomiting, bowel or 

bladder changes or LE swelling.


Physical Exam (per Admitting):  


   General Appearance:  WD/WN, no apparent distress


   Head:  normocephalic, atraumatic


   Eyes:  normal inspection, sclerae normal


   ENT:  normal ENT inspection, hearing grossly normal, pharynx normal


   Neck:  supple, thyroid normal, trachea midline


   Respiratory/Chest:  chest non-tender, lungs clear, normal breath sounds, no 

respiratory distress, no accessory muscle use


   Cardiovascular:  regular rate, rhythm, no murmur, normal peripheral pulses


   Abdomen/GI:  non tender, soft, no organomegaly


   Back:  normal inspection


   Extremities/Musculoskelatal:  no calf tenderness, no pedal edema, + 

pertinent finding (Diffuse erythema, edema of R elbow with extension down 

forearm. Warm to touch, TTP at the olecranon. ROM intact. )


   Neurologic/Psych:  no motor/sensory deficits, alert, normal mood/affect, 

oriented x 3


   Skin:  normal color, warm/dry





Hospital Course


This is a 68-year-old male with a past medical history of hypothyroidism, HLD 

and history of renal stones who presents with worsening right elbow pain and 

swelling 2 days.








RUE cellulitis, possible septic olecranon bursitis:


possible sepsis. Presented with fever, tachycardia, leukocytosis


Trauma to R elbow ~ 6 weeks ago


was on iv cefepime and iv vanco


orthopedics on board


continue same for now


s/p aspiration of olecranon bursa-await cx


possible I and D 3/29/18


cx mrsa


s/p picc line


abx changed to iv daptomycin to give one week from day of surgery then po 

Bactrim for 1-2 weeks per ID





Hypothyroidism:


on levothyroxine 





HLD:


to hold   statin while on daptomycin





discharged home


Total time spent on discharge = 35MINUTES


This includes examination of the patient, discharge planning, medication 

reconciliation, and communication with other providers.





Discharge Instructions


Discharge Instructions


Date of Service


Apr 1, 2018.





Admission


Reason for Admission:  Cellulitis Of Right Elbow





Discharge


Discharge Diagnosis / Problem:  Rt septic olecranon bursitis





Discharge Goals


Goal(s):  Decrease discomfort, Improve function





Activity Recommendations


Activity Limitations:  resume your previous activity





.





Instructions / Follow-Up


Instructions / Follow-Up


FOLLOWUP WITH FAMILY DOCTOR  AS SCHEDULED IN ONE WEEK





FOLLOWUP WITH ORTHOPEDICS Dr Byrne ~ 10-14 days, call [phone] for appt.





TO START TAKING ORAL ANTIBIOTIC BACTRIM ONE TAB TWICE DAILY FOR 10 TO 14 DAYS 

AFTER COMPLETING IV ANTIBIOTIC COURSE





TO HOLD LIPITOR WHILE ON IV ANTIBIOTIC AND RESUME USUAL HOME DOSE AFTER 

COMPLETION OF IV ANTIBIOTIC COURSE.





ORTHOPEDICS RECOMMENDATIONS:


Range of motion as tolerated right elbow.  Clean, dry dressing as needed.  

Wound may be left uncovered when dry.  Pt may remove dressing to shower.  Ice 

to elbow as needed for discomfort.  Follow-up with Dr Byrne ~ 10-14 days, 

call [phone] for appt.





Current Hospital Diet


Patient's current hospital diet: AHA Diet (Heart Healthy)





Discharge Diet


Recommended Diet:  AHA Diet (Heart Healthy)





Procedures


Procedures Performed:  


Incision and Drainage Right Olecranon Bursitis, Implantation of Stimulan beads





Pending Studies


Studies pending at discharge:  no





Medical Emergencies








.


Who to Call and When:





Medical Emergencies:  If at any time you feel your situation is an emergency, 

please call 911 immediately.





.





Non-Emergent Contact


Non-Emergency issues call your:  Primary Care Provider





.


.








"Provider Documentation" section prepared by Bill Reynolds.








.





Consultant Recommendations


Consultant Recommendations:


Range of motion as tolerated right elbow.  Clean, dry dressing as needed.  

Wound may be left uncovered when dry.  Pt may remove dressing to shower.  Ice 

to elbow as needed for discomfort.  Follow-up with Dr Byrne ~ 10-14 days, 

call [phone] for appt.

## 2018-04-01 NOTE — DISCHARGE INSTRUCTIONS
Discharge Instructions


Date of Service


Apr 1, 2018.





Admission


Reason for Admission:  Cellulitis Of Right Elbow





Discharge


Discharge Diagnosis / Problem:  Rt septic olecranon bursitis





Discharge Goals


Goal(s):  Decrease discomfort, Improve function





Activity Recommendations


Activity Limitations:  resume your previous activity





.





Instructions / Follow-Up


Instructions / Follow-Up


FOLLOWUP WITH FAMILY DOCTOR  AS SCHEDULED IN ONE WEEK





FOLLOWUP WITH ORTHOPEDICS Dr Byrne ~ 10-14 days, call [phone] for appt.





TO START TAKING ORAL ANTIBIOTIC BACTRIM ONE TAB TWICE DAILY FOR 10 TO 14 DAYS 

AFTER COMPLETING IV ANTIBIOTIC COURSE





TO HOLD LIPITOR WHILE ON IV ANTIBIOTIC AND RESUME USUAL HOME DOSE AFTER 

COMPLETION OF IV ANTIBIOTIC COURSE.





ORTHOPEDICS RECOMMENDATIONS:


Range of motion as tolerated right elbow.  Clean, dry dressing as needed.  

Wound may be left uncovered when dry.  Pt may remove dressing to shower.  Ice 

to elbow as needed for discomfort.  Follow-up with Dr Byrne ~ 10-14 days, 

call [phone] for appt.





Current Hospital Diet


Patient's current hospital diet: AHA Diet (Heart Healthy)





Discharge Diet


Recommended Diet:  AHA Diet (Heart Healthy)





Procedures


Procedures Performed:  


Incision and Drainage Right Olecranon Bursitis, Implantation of Stimulan beads





Pending Studies


Studies pending at discharge:  no





Medical Emergencies








.


Who to Call and When:





Medical Emergencies:  If at any time you feel your situation is an emergency, 

please call 911 immediately.





.





Non-Emergent Contact


Non-Emergency issues call your:  Primary Care Provider





.


.








"Provider Documentation" section prepared by Bill Reynolds.








.





Consultant Recommendations


Consultant Recommendations:


Range of motion as tolerated right elbow.  Clean, dry dressing as needed.  

Wound may be left uncovered when dry.  Pt may remove dressing to shower.  Ice 

to elbow as needed for discomfort.  Follow-up with Dr Byrne ~ 10-14 days, 

call [phone] for appt.

## 2018-04-01 NOTE — PROGRESS NOTE
Internal Med Progress Note


Date of Service:


Apr 1, 2018.


Provider Documentation:





SUBJECTIVE:





s/p I and D of the right elbow


erythema of right upper ext much improved


no chest pain or sob


ambulating fine


ok for discharge








OBJECTIVE:





Vital Signs-as noted below





Exam:


General-alert and oriented. 


ENT-Normal hearing


Neck-no neck masses


Lungs-cta b/l no wheezing no  crackles present


Heart-S1 and S2 heard regular rate and rhythm no murmurs .


Abdomen-Soft bowel sounds present non tender no distension 


Extremities- s/p I and D of right elbow-in dressing


Neuro-alert and awake


moves extremities





Lab data as noted below.


ASSESSMENT & PLAN:


This is a 68-year-old male with a past medical history of hypothyroidism, HLD 

and history of renal stones who presents with worsening right elbow pain and 

swelling 2 days.








RUE cellulitis, possible septic olecranon bursitis:


possible sepsis. Presented with fever, tachycardia, leukocytosis


Trauma to R elbow ~ 6 weeks ago


was on iv cefepime and iv vanco


orthopedics on board


continue same for now


s/p aspiration of olecranon bursa-await cx


possible I and D 3/29/18


cx mrsa


s/p picc line


abx changed to iv daptomycin to give one week from day of surgery then po 

Bactrim for 1-2 weeks per ID





Hypothyroidism:


on levothyroxine 





HLD:


to hold   statin while on daptomycin





discharged home


Vital Signs:











  Date Time  Temp Pulse Resp B/P (MAP) Pulse Ox O2 Delivery O2 Flow Rate FiO2


 


4/1/18 15:46 36.8 72 18  95 Room Air  


 


4/1/18 08:00     95 Room Air  


 


4/1/18 07:11 36.8 72 18 142/86 (104) 95 Room Air  


 


4/1/18 00:00      Room Air  


 


3/31/18 22:23 36.8 64 18 144/86 (105) 95 Room Air  


 


3/31/18 20:00      Room Air  








Lab Results:





Results Past 24 Hours








Test


  4/1/18


05:41 4/1/18


05:51 Range/Units


 


 


White Blood Count 10.38  4.8-10.8  K/uL


 


Red Blood Count 4.56  4.7-6.1  M/uL


 


Hemoglobin 14.2  14.0-18.0  g/dL


 


Hematocrit 41.2  42-52  %


 


Mean Corpuscular Volume 90.4    fL


 


Mean Corpuscular Hemoglobin 31.1  25-34  pg


 


Mean Corpuscular Hemoglobin


Concent 34.5


  


  32-36  g/dl


 


 


Platelet Count 253  130-400  K/uL


 


Mean Platelet Volume 9.8  7.4-10.4  fL


 


RDW Standard Deviation 43.5  36.4-46.3  fL


 


RDW Coefficient of Variation 13.2  11.5-14.5  %


 


Neutrophils % (Manual) 67.8   %


 


Lymphocytes % (Manual) 15.7   %


 


Monocytes % (Manual) 9.6   %


 


Eosinophils % (Manual) 1.7   %


 


Metamyelocytes % 1.7   %


 


Myelocytes % 3.5   %


 


Neutrophils # (Manual) 7.04  1.4-6.5  K/uL


 


Total Absolute Neutrophils 7.04  1.4-6.5  K/uL


 


Lymphocytes # (Manual) 1.63  1.2-3.4  K/uL


 


Total Absolute Lymphocytes 1.63  1.2-3.4  K/uL


 


Monocytes # (Manual) 1.00  0.11-0.59  K/uL


 


Eosinophils # (Manual) 0.18  0-0.5  K/uL


 


Metamyelocytes # 0.18  0-0  K/uL


 


Myelocytes # 0.36  0-0  K/uL


 


Red Blood Cell Morphology Unremarkable   


 


Sodium Level  137 136-145  mmol/L


 


Potassium Level  4.0 3.5-5.1  mmol/L


 


Chloride Level  104   mmol/L


 


Carbon Dioxide Level  27 21-32  mmol/L


 


Anion Gap  7.0 3-11  mmol/L


 


Blood Urea Nitrogen  11 7-18  mg/dl


 


Creatinine


  


  0.77


  0.60-1.40


mg/dl


 


Est Creatinine Clear Calc


Drug Dose 


  92.2


   ml/min


 


 


Estimated GFR (


American) 


  106.6


   


 


 


Estimated GFR (Non-


American 


  91.9


   


 


 


BUN/Creatinine Ratio  13.9 10-20  


 


Random Glucose  92 70-99  mg/dl


 


Calcium Level  9.1 8.5-10.1  mg/dl


 


Magnesium Level  2.1 1.8-2.4  mg/dl

## 2018-04-01 NOTE — CONSULTANT RECOMMENDATIONS
Consultant Recommendations


Date of Service


Apr 1, 2018.





Consultant Recommendations


Range of motion as tolerated right elbow.  Clean, dry dressing as needed.  

Wound may be left uncovered when dry.  Pt may remove dressing to shower.  Ice 

to elbow as needed for discomfort.  Follow-up with Dr Byrne ~ 10-14 days, 

call [phone] for appt.

## 2018-05-16 ENCOUNTER — HOSPITAL ENCOUNTER (OUTPATIENT)
Dept: HOSPITAL 45 - C.LABSPEC | Age: 68
Discharge: HOME | End: 2018-05-16
Payer: COMMERCIAL

## 2018-05-16 DIAGNOSIS — M71.121: Primary | ICD-10-CM

## 2018-08-02 ENCOUNTER — HOSPITAL ENCOUNTER (INPATIENT)
Dept: HOSPITAL 45 - C.EDB | Age: 68
LOS: 5 days | Discharge: HOME HEALTH | DRG: 871 | End: 2018-08-07
Attending: HOSPITALIST | Admitting: HOSPITALIST
Payer: COMMERCIAL

## 2018-08-02 VITALS
OXYGEN SATURATION: 96 % | TEMPERATURE: 98.24 F | SYSTOLIC BLOOD PRESSURE: 146 MMHG | HEART RATE: 83 BPM | DIASTOLIC BLOOD PRESSURE: 100 MMHG

## 2018-08-02 VITALS
HEIGHT: 70 IN | WEIGHT: 185.85 LBS | HEIGHT: 70 IN | BODY MASS INDEX: 26.61 KG/M2 | WEIGHT: 185.85 LBS | BODY MASS INDEX: 26.61 KG/M2

## 2018-08-02 DIAGNOSIS — Z51.81: ICD-10-CM

## 2018-08-02 DIAGNOSIS — Z86.14: ICD-10-CM

## 2018-08-02 DIAGNOSIS — B96.89: ICD-10-CM

## 2018-08-02 DIAGNOSIS — E78.5: ICD-10-CM

## 2018-08-02 DIAGNOSIS — A41.59: Primary | ICD-10-CM

## 2018-08-02 DIAGNOSIS — Z82.49: ICD-10-CM

## 2018-08-02 DIAGNOSIS — I10: ICD-10-CM

## 2018-08-02 DIAGNOSIS — Z79.82: ICD-10-CM

## 2018-08-02 DIAGNOSIS — I33.0: ICD-10-CM

## 2018-08-02 DIAGNOSIS — E03.9: ICD-10-CM

## 2018-08-02 DIAGNOSIS — Z79.899: ICD-10-CM

## 2018-08-02 LAB
ALBUMIN SERPL-MCNC: 3.9 GM/DL (ref 3.4–5)
ALP SERPL-CCNC: 68 U/L (ref 45–117)
ALT SERPL-CCNC: 24 U/L (ref 12–78)
AST SERPL-CCNC: 19 U/L (ref 15–37)
BASOPHILS # BLD: 0.02 K/UL (ref 0–0.2)
BASOPHILS NFR BLD: 0.1 %
BUN SERPL-MCNC: 17 MG/DL (ref 7–18)
CALCIUM SERPL-MCNC: 9.2 MG/DL (ref 8.5–10.1)
CK MB SERPL-MCNC: < 1 NG/ML (ref 0.5–3.6)
CO2 SERPL-SCNC: 26 MMOL/L (ref 21–32)
CREAT SERPL-MCNC: 1 MG/DL (ref 0.6–1.4)
EOS ABS #: 0 K/UL (ref 0–0.5)
EOSINOPHIL NFR BLD AUTO: 186 K/UL (ref 130–400)
GLUCOSE SERPL-MCNC: 112 MG/DL (ref 70–99)
HCT VFR BLD CALC: 47.9 % (ref 42–52)
HGB BLD-MCNC: 17.1 G/DL (ref 14–18)
IG#: 0.09 K/UL (ref 0–0.02)
IMM GRANULOCYTES NFR BLD AUTO: 8.2 %
INR PPP: 1 (ref 0.9–1.1)
LIPASE: 241 U/L (ref 73–393)
LYMPHOCYTES # BLD: 1.26 K/UL (ref 1.2–3.4)
MCH RBC QN AUTO: 32.4 PG (ref 25–34)
MCHC RBC AUTO-ENTMCNC: 35.7 G/DL (ref 32–36)
MCV RBC AUTO: 90.7 FL (ref 80–100)
MONO ABS #: 0.93 K/UL (ref 0.11–0.59)
MONOCYTES NFR BLD: 6.1 %
NEUT ABS #: 13.01 K/UL (ref 1.4–6.5)
NEUTROPHILS # BLD AUTO: 0 %
NEUTROPHILS NFR BLD AUTO: 85 %
PMV BLD AUTO: 10.5 FL (ref 7.4–10.4)
POTASSIUM SERPL-SCNC: 3.9 MMOL/L (ref 3.5–5.1)
PROT SERPL-MCNC: 7.5 GM/DL (ref 6.4–8.2)
PTT PATIENT: 31.1 SECONDS (ref 21–31)
RED CELL DISTRIBUTION WIDTH CV: 13.8 % (ref 11.5–14.5)
RED CELL DISTRIBUTION WIDTH SD: 44.7 FL (ref 36.4–46.3)
SODIUM SERPL-SCNC: 136 MMOL/L (ref 136–145)
WBC # BLD AUTO: 15.31 K/UL (ref 4.8–10.8)

## 2018-08-02 RX ADMIN — ENOXAPARIN SODIUM SCH MG: 40 INJECTION SUBCUTANEOUS at 21:00

## 2018-08-02 NOTE — HISTORY & PHYSICAL EXAMINATION
DATE OF ADMISSION:  08/02/2018



IM ATTENDING :



Patient seen and examined.  

History obtained from patient and records.  

Preceding documentation by Ms. Mary Ricardo PA-C reviewed.

 

In addition, serum procalcitonin was normal.



FINAL ASSESSMENT AND PLAN as follows :



1.  Possible sepsis

possible sources :

infective endocarditis.  possible splinter hemorrhages on the left upper 
extremity. (hx PICC line placement for MRSA septic olecranon bursitis sp Rx.)

diarrhea, rule out C. difficile.  (Recent antibiotic Rx given at [place] 
ER.)



2.  Hypertension, stable.  

3.  Renal artery stenosis as per records.  

 



PCU RE (concerns for infective endocarditis)

Cultures.  Stool C. diff.

Hold off on antibiotics until DARION/CS results known 

Cardiology consult. RE Possible DARION in a.m.

(ER provider already in touch with Dr. Briggs.)

DVT prophylaxis, Lovenox subQ. 

Full code.

 

 

 

MTDD

## 2018-08-02 NOTE — EMERGENCY ROOM VISIT NOTE
History


Report prepared by Deana:  Patricia Cole


Under the Supervision of:  Dr. Chandler Kuhn D.O.


First contact with patient:  17:06


Chief Complaint:  FEVER


Stated Complaint:  HIGH FEVER, SCABS ON LEFT HAND





History of Present Illness


The patient is a 68 year old male who presents to the Emergency Room with 

complaints of a persistent fever beginning yesterday. He notes his fever was 

102.2F, and leaves him feeling very weak. The patient reports he began feeling 

unwell 2 weeks ago, when he began noticing a rash on his fingers. The patient 

notes he was seen in an ED in [place] at that time for shoulder pain, but 

did not mention the rash as he believed it to be poison ivy. He states it began 

as a small, clear fluid-filled blister on his L wrist, and has spread into 

scabs on his L hand and up his L arm. The patient reports the scabbing areas 

are very painful when brushed together. He notes the tips of his fingers are 

numb. The patient notes he experienced 2 or 3 episodes of diarrhea today, and 

denies blood or mucus in his stool. He also notes dark urine. He denies joint 

swelling, headaches, leg redness, CP, SOB, or blood in urine. The patient 

states his PCP diagnosed him with shingles 1.5 weeks ago and started him on anti

-viral medications, which he completed yesterday. He mentions the PCP was not 

convinced the rash was due to shingles as it was unusual looking. The patient 

notes he was admitted 4 months ago for 6 days for MRSA on his elbow, which was 

treated with IV and oral antibiotics. He states his elbow has been asymptomatic 

for about a month now.





   Source of History:  patient


   Onset:  yesterday


   Position:  head


   Symptom Intensity:  102.2F


   Quality:  other (persistent)


   Associated Symptoms:  + diarrhea (2 or 3 episodes today), + urinary symptoms 

(dark urine, denies blood in urine), + rash, No headache, No chest pain, No SOB


   Note:


Associated symptom: numbness in tips of finger. Denies: joint swelling, leg 

redness





Review of Systems


See HPI for pertinent positives & negatives. A total of 10 systems reviewed and 

were otherwise negative.





Past Medical & Surgical


Medical Problems:


(1) Cellulitis of right elbow


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


(3) HLD (hyperlipidemia)


(4) Hypothyroidism


(5) Olecranon bursitis


(6) Renal colic


(7) Sepsis


(8) Sepsis


Surgical Problems:


(1) History of total bilateral knee replacement








Family History





FH: CHF (congestive heart failure)


  SISTER


FH: HTN (hypertension)


  BROTHER


  SISTER


No significant history


  FATHER


  MOTHER





Social History


Smoking Status:  Never Smoker


Smokeless Tobacco Use:  No


Alcohol Use:  occasionally (3-5 times/week)


Drug Use:  none


Marital Status:  


Housing Status:  lives with family


Occupation Status:  retired





Current/Historical Medications


Scheduled


Aspirin (Aspirin 81), 81 MG PO DAILY


Atorvastatin (Lipitor), 10 MG PO QAM


Levothyroxine Sodium (Levothyroxine Sodium), 125 MCG PO QAM


Multivitamins/Minerals (Mvi With Minerals), 1 TAB PO QAM





Scheduled PRN


Melatonin-Pyridoxine (Melatonin), 200 MCG PO HS PRN for Sleep





Allergies


Coded Allergies:  


     No Known Allergies (Unverified , 8/2/18)





Physical Exam


Vital Signs











  Date Time  Temp Pulse Resp B/P (MAP) Pulse Ox O2 Delivery O2 Flow Rate FiO2


 


8/2/18 20:16  100 18 122/72 95 Room Air  


 


8/2/18 18:19  88 18 125/76 95 Room Air  


 


8/2/18 18:00     95 Room Air  


 


8/2/18 17:08 37.3 109 18 140/81 95 Room Air  











Physical Exam


GENERAL:  Patient is awake, alert, and in no acute distress. Patient is resting 

comfortably and showing no signs of anxiety


EYES: The conjunctivae are clear.  The pupils are round and reactive. 


EARS, NOSE, MOUTH AND THROAT: The nose is without any evidence of any 

deformity. Mucous membranes are moist. Tongue is midline 


NECK: The neck is nontender and supple.


RESPIRATORY: Normal respiratory effort is noted. There is no evidence of 

wheezing rhonchi or rales to auscultation. 


CARDIOVASCULAR:  Regular rate and rhythm noted. There no murmurs rubs or 

gallops normal S1 normal S2 


GASTROINTESTINAL: The abdomen is soft. Bowel sounds are present in all 

quadrants. Abdomen is nontender. 


BACK:  No midline tenderness or or step-off noted range of motion in flexion 

extension as well as rotation no signs of muscle spasm noted.


MUSCULOSKELETAL/EXTREMITIES: There is no evidence of gross deformity. Full 

range of motion is noted in the hips and shoulders. 


SKIN: A rash noted to LUE only, raised and appears it may have been healing 

hemorrhagic bullae at one time. Palpable and tender, appears to be over the 

dorsum as well as partially the palm of the L hand, extending to the posterior 

aspect of the LUE 


NEUROLOGIC:  Patient is awake alert and oriented x3.





Medical Decision & Procedures


ER Provider


Diagnostic Interpretation:


Radiology results as stated below per my review and radiologist interpretation:





SINGLE VIEW CHEST





CLINICAL HISTORY:  Sepsis.





FINDINGS: An AP, portable, upright chest radiograph is compared to study dated


3/27/2018. The examination is mildly degraded by portable technique and patient


rotation.  The heart is top normal for projection and there is mild


atherosclerotic calcification of the thoracic aorta. The pulmonary vasculature


is noncongested. There is mild bibasilar atelectasis. The lungs and pleural


spaces are otherwise clear. No pneumothorax is seen. The skeletal structures are


osteopenic. The bony thorax is grossly intact.





IMPRESSION: No acute cardiopulmonary abnormality.





Electronically signed by:  Tito Arreaga M.D.


8/2/2018 5:45 PM





Dictated Date/Time:  8/2/2018 5:45 PM





Laboratory Results


8/2/18 18:00








Red Blood Count 5.28, Mean Corpuscular Volume 90.7, Mean Corpuscular Hemoglobin 

32.4, Mean Corpuscular Hemoglobin Concent 35.7, Mean Platelet Volume 10.5, 

Neutrophils (%) (Auto) 85.0, Lymphocytes (%) (Auto) 8.2, Monocytes (%) (Auto) 

6.1, Eosinophils (%) (Auto) 0.0, Basophils (%) (Auto) 0.1, Neutrophils # (Auto) 

13.01, Lymphocytes # (Auto) 1.26, Monocytes # (Auto) 0.93, Eosinophils # (Auto) 

0.00, Basophils # (Auto) 0.02





8/2/18 18:00

















Test


  8/2/18


18:00 8/2/18


18:18


 


White Blood Count


  15.31 K/uL


(4.8-10.8) 


 


 


Red Blood Count


  5.28 M/uL


(4.7-6.1) 


 


 


Hemoglobin


  17.1 g/dL


(14.0-18.0) 


 


 


Hematocrit 47.9 % (42-52)  


 


Mean Corpuscular Volume


  90.7 fL


() 


 


 


Mean Corpuscular Hemoglobin


  32.4 pg


(25-34) 


 


 


Mean Corpuscular Hemoglobin


Concent 35.7 g/dl


(32-36) 


 


 


Platelet Count


  186 K/uL


(130-400) 


 


 


Mean Platelet Volume


  10.5 fL


(7.4-10.4) 


 


 


Neutrophils (%) (Auto) 85.0 %  


 


Lymphocytes (%) (Auto) 8.2 %  


 


Monocytes (%) (Auto) 6.1 %  


 


Eosinophils (%) (Auto) 0.0 %  


 


Basophils (%) (Auto) 0.1 %  


 


Neutrophils # (Auto)


  13.01 K/uL


(1.4-6.5) 


 


 


Lymphocytes # (Auto)


  1.26 K/uL


(1.2-3.4) 


 


 


Monocytes # (Auto)


  0.93 K/uL


(0.11-0.59) 


 


 


Eosinophils # (Auto)


  0.00 K/uL


(0-0.5) 


 


 


Basophils # (Auto)


  0.02 K/uL


(0-0.2) 


 


 


RDW Standard Deviation


  44.7 fL


(36.4-46.3) 


 


 


RDW Coefficient of Variation


  13.8 %


(11.5-14.5) 


 


 


Immature Granulocyte % (Auto) 0.6 %  


 


Immature Granulocyte # (Auto)


  0.09 K/uL


(0.00-0.02) 


 


 


Erythrocyte Sedimentation Rate


  21 mm/hr


(0-14) 


 


 


Prothrombin Time


  10.4 SECONDS


(9.0-12.0) 


 


 


Prothromb Time International


Ratio 1.0 (0.9-1.1) 


  


 


 


Activated Partial


Thromboplast Time 31.1 SECONDS


(21.0-31.0) 


 


 


Partial Thromboplastin Ratio 1.2  


 


Urine Color DK YELLOW  


 


Urine Appearance CLEAR (CLEAR)  


 


Urine pH 5.0 (4.5-7.5)  


 


Urine Specific Gravity


  1.027


(1.000-1.030) 


 


 


Urine Protein 1+ (NEG)  


 


Urine Glucose (UA) NEG (NEG)  


 


Urine Ketones TRACE (NEG)  


 


Urine Occult Blood NEG (NEG)  


 


Urine Nitrite NEG (NEG)  


 


Urine Bilirubin NEG (NEG)  


 


Urine Urobilinogen NEG (NEG)  


 


Urine Leukocyte Esterase NEG (NEG)  


 


Urine WBC (Auto) 1-5 /hpf (0-5)  


 


Urine RBC (Auto) 0-4 /hpf (0-4)  


 


Urine Hyaline Casts (Auto) 1-5 /lpf (0-5)  


 


Urine Epithelial Cells (Auto)


  5-10 /lpf


(0-5) 


 


 


Urine Bacteria (Auto) NEG (NEG)  


 


Anion Gap


  7.0 mmol/L


(3-11) 


 


 


Est Creatinine Clear Calc


Drug Dose 71.0 ml/min 


  


 


 


Estimated GFR (


American) 88.0 


  


 


 


Estimated GFR (Non-


American 75.9 


  


 


 


BUN/Creatinine Ratio 16.6 (10-20)  


 


Calcium Level


  9.2 mg/dl


(8.5-10.1) 


 


 


Magnesium Level


  2.1 mg/dl


(1.8-2.4) 


 


 


Total Bilirubin


  0.8 mg/dl


(0.2-1) 


 


 


Aspartate Amino Transf


(AST/SGOT) 19 U/L (15-37) 


  


 


 


Alanine Aminotransferase


(ALT/SGPT) 24 U/L (12-78) 


  


 


 


Alkaline Phosphatase


  68 U/L


() 


 


 


Total Creatine Kinase


  73 U/L


() 


 


 


Creatine Kinase MB


  < 1.0 ng/ml


(0.5-3.6) 


 


 


Creatine Kinase MB Ratio  (0-3.0)  


 


Troponin I


  < 0.015 ng/ml


(0-0.045) 


 


 


C-Reactive Protein


  12.00 mg/dl


(0-0.29) 


 


 


Total Protein


  7.5 gm/dl


(6.4-8.2) 


 


 


Albumin


  3.9 gm/dl


(3.4-5.0) 


 


 


Globulin


  3.6 gm/dl


(2.5-4.0) 


 


 


Albumin/Globulin Ratio 1.1 (0.9-2)  


 


Lipase


  241 U/L


() 


 


 


Procalcitonin


  0.12 ng/ml


(0-0.5) 


 


 


Lyme Disease IgG Antibody NEG (NEG)  


 


Lyme Disease IgM Antibody NEG (NEG)  


 


Bedside Lactic Acid Venous


  


  0.79 mmol/L


(0.90-1.70)





Laboratory results per my review.





Medications Administered











 Medications


  (Trade)  Dose


 Ordered  Sig/Mera


 Route  Start Time


 Stop Time Status Last Admin


Dose Admin


 


 Sodium Chloride  1,000 ml @ 


 999 mls/hr  Q1H1M ONCE


 IV  8/2/18 17:27


 8/2/18 18:27 DC 8/2/18 18:17


999 MLS/HR


 


 Vancomycin HCl


 2000 mg/Sodium


 Chloride  540 ml @ 


 200 mls/hr  NOW  STAT


 IV  8/2/18 20:39


 8/2/18 23:20  8/2/18 20:56


200 MLS/HR











ECG Per My Interpretation


Indication:  tachycardia


Rate (beats per minute):  101


Rhythm:  sinus tachycardia


Findings:  no acute ischemic change, no ectopy


Comparison ECG Date:  3/27/18


Change:  no significant change





ED Course


1717: The patient was evaluated in room C10. A complete history and physical 

examination were performed. 





1727: OrderedNSS 1,000 ml @ 999 mls/hr IV





2039: Ordered Vancomycin HCl 2000 mg/Sodium Chloride 540 mls @ 200 mls/hr IV





1958: I discussed the case with Dr. Parsons [facility] cardiology. He 

recommended IV antibiotics and a transesophageal echo tomorrow. He recommended 

keeping patient NPO after midnight.





2031: I discussed the patient's case with Dr. Calderon, [facility] hospitalist. 

The patient will be evaluated for further management. 





2032: I updated the patient on the treatment plan. He will be admitted for 

further evaluation.





Medical Decision


Etiologies such as viral syndrome, otitis, pharyngitis, pneumonia, influenza, 

meningitis, urinary tract infection, sepsis, bacteremia, as well as others were 

entertained.





Nursing notes reviewed.





Additional history is obtained from the patient's family member.





Additional history was obtained from the patient's primary care office.





The patient is a 68-year-old male who presented to the emergency department for 

a rash on his upper extremity.  The patient's been having fevers for the last 

few days.  He was initially seen for this rash recently and was diagnosed with 

shingles.  He went for a follow-up with his primary care physician's office 

because he was having fevers and it was noted that the rash appeared to be in 

more than one dermatome and did not appear to be consistent with shingles.  His 

provider called me to discuss the patient coming to the emergency department.  

The patient was felt to be at significant risk for endocarditis because of his 

recent olecranon bursitis.  This was complicated by a MRSA infection.  The 

patient was treated with IV fluids and IV vancomycin in the emergency 

department.  I discussed patient's laboratory and radiographic studies with 

him.  I also discussed his case with the on-call [facility] cardiologist.  I 

also discussed his case with the on-call [facility] hospitalist.  They have 

agreed to evaluate patient in the emergency department for further management 

and disposition.  The patient was to be n.p.o. for transesophageal echo in the 

morning.





Medication Reconcilliation


Current Medication List:  was personally reviewed by me





Blood Pressure Screening


Patient's blood pressure:  Normal blood pressure


Blood pressure disposition:  Did not require urgent referral





Consults


Time Called:  1944


Consulting Physician:  Leif Tamez cardiology


Returned Call:  1958


I discussed the case with Leif Tamez cardiology. He recommended IV 

antibiotics and a transesophageal echo tomorrow. He recommended keeping patient 

NPO after midnight.


Additional Consults:  


   Time Called:  2023


   Consulted Physician:  Leif Lima hospitalist


   Returned Call:  2031


   Additional Comments:


I discussed the patient's case with Leif Lima hospitalist. The 

patient will be evaluated for further management.





Impression





 Primary Impression:  


 Fever


 Additional Impressions:  


 Rash


 SBE (subacute bacterial endocarditis)





Scribe Attestation


The scribe's documentation has been prepared under my direction and personally 

reviewed by me in its entirety. I confirm that the note above accurately 

reflects all work, treatment, procedures, and medical decision making performed 

by me.





Departure Information


Dispostion


Being Evaluated By Hospitalist





Referrals


Dev Post M.D. (PCP)





Patient Instructions


My [facility]





Problem Qualifiers








 Primary Impression:  


 Fever


 Fever type:  unspecified  Qualified Codes:  R50.9 - Fever, unspecified

## 2018-08-02 NOTE — HISTORY AND PHYSICAL
History & Physical


Date & Time of Service:


Aug 2, 2018 at 21:26


Chief Complaint:


High Fever, Scabs On Left Hand


Primary Care Physician:


Dev Post M.D.


History of Present Illness


Source:  patient, clinic records, hospital records


Pt is 69 y/o M with PMH hypothyroidism, hyperlipidemia presented to ER from 

PCPs office for fever and rash.  Patient states yesterday started with fever 

high of 102.2F and generalized weakness.  Taking ibuprofen for fever. Reports 2 

episodes of diarrhea today.  Approximately 2 weeks ago patient states started 

with erythematous painful lesion to left wrist which then progressed to left 

hand and left arm and also with some blisters with associated burning sensation 

in numbness of his fingertips. Also reports had swelling to left fingers.  Was 

seen by PCP diagnosed with possible shingles and started on Valtrex and Lyrica.

  Patient states finished Valtrex and stopped using Lyrica yesterday and 

reports upper arm and forearm with improvement of rash and edema of fingers.  

Reports continued sensitivity and burning sensation however is less than 

initial onset.  Patient denies other rashes or lesions. Patient with history 3/

2018 of right septic elbow bursitis (MRSA) S/P I&D by Dr. Byrne and also 

followed by ID - Dr Blair.  She was initially treated with vancomycin and Zosyn 

and transition to daptomycin and oral Bactrim.  Patient had PICC line at that 

time.  Patient states right elbow has been nontender, no recurrent erythema/

edema/discharge. Denies diaphoresis, N/V, melena, hematochezia, HA, dizziness, 

syncope, vision changes, neck pain, CP, SOB, orthopnea, palpitations, cough, 

sore throat, choking, otalgia, rhinorrhea, abdominal pain, urinary symptoms, 

weight changes.  Denies cardiac history or history of valve replacement





History stress echo 5/2017: No ischemia, EF: 60%.





Past Medical/Surgical History


Medical Problems:


(1) Cellulitis of right elbow


Status: Resolved  





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


Status: Chronic  





(3) HLD (hyperlipidemia)


Status: Chronic  





(4) Hypothyroidism


Status: Chronic  





(5) Olecranon bursitis


Status: Resolved  





(6) Renal colic


Status: Chronic  





(7) Sepsis


Status: Resolved  





Surgical Problems:


(1) History of total bilateral knee replacement


Status: Chronic  








Family History





FH: CHF (congestive heart failure)


  SISTER


FH: HTN (hypertension)


  BROTHER


  SISTER


No significant history


  FATHER


  MOTHER





Social History


Smoking Status:  Never Smoker


Smokeless Tobacco Use:  No


Alcohol Use:  none


Drug Use:  none


Marital Status:  


Housing status:  lives with significant other


Occupational Status:  retired





Immunizations


History of Influenza Vaccine:  No


History of Tetanus Vaccine?:  No


History of Pneumococcal:  No


History of Hepatitis B Vaccine:  No





Allergies


Coded Allergies:  


     No Known Allergies (Unverified , 8/2/18)





Home Medications


Scheduled


Aspirin (Aspirin 81), 81 MG PO DAILY


Atorvastatin (Lipitor), 10 MG PO QAM


Levothyroxine Sodium (Levothyroxine Sodium), 125 MCG PO QAM


Multivitamins/Minerals (Mvi With Minerals), 1 TAB PO QAM





Scheduled PRN


Melatonin-Pyridoxine (Melatonin), 200 MCG PO HS PRN for Sleep





Review of Systems


See HPI for pertinent positives & negatives. All other systems reviewed and 

were otherwise negative





Physical Exam


Vital Signs











  Date Time  Temp Pulse Resp B/P (MAP) Pulse Ox O2 Delivery O2 Flow Rate FiO2


 


8/2/18 20:16  100 18 122/72 95 Room Air  


 


8/2/18 18:19  88 18 125/76 95 Room Air  


 


8/2/18 18:00     95 Room Air  


 


8/2/18 17:08 37.3 109 18 140/81 95 Room Air  








General Appearance:  WD/WN, no apparent distress


Head:  normocephalic, atraumatic


Eyes:  normal inspection, PERRL, sclerae normal


ENT:  hearing grossly normal, pharynx normal, + pertinent finding (Mucous 

membranes moist)


Neck:  supple, trachea midline


Respiratory/Chest:  lungs clear, normal breath sounds, no respiratory distress


Cardiovascular:  regular rate, rhythm, no murmur, normal peripheral pulses


Abdomen/GI:  normal bowel sounds, non tender, soft


Back:  no CVA tenderness


Extremities/Musculoskelatal:  no calf tenderness, normal capillary refill, no 

pedal edema, normal range of motion


Neurologic/Psych:  alert, normal mood/affect, oriented x 3


Skin:  warm/dry, + pertinent finding (LUE: Splinter hemorrhages noted middle, 

index finger.  Scabs noted to left forearm, erythematous/purple nodules palmar 

aspect left hand. +petechiae fingers.)





Diagnostics


Laboratory Results





Results Past 24 Hours








Test


  8/2/18


18:00 8/2/18


18:18 Range/Units


 


 


White Blood Count 15.31  4.8-10.8  K/uL


 


Red Blood Count 5.28  4.7-6.1  M/uL


 


Hemoglobin 17.1  14.0-18.0  g/dL


 


Hematocrit 47.9  42-52  %


 


Mean Corpuscular Volume 90.7    fL


 


Mean Corpuscular Hemoglobin 32.4  25-34  pg


 


Mean Corpuscular Hemoglobin


Concent 35.7


  


  32-36  g/dl


 


 


Platelet Count 186  130-400  K/uL


 


Mean Platelet Volume 10.5  7.4-10.4  fL


 


Neutrophils (%) (Auto) 85.0   %


 


Lymphocytes (%) (Auto) 8.2   %


 


Monocytes (%) (Auto) 6.1   %


 


Eosinophils (%) (Auto) 0.0   %


 


Basophils (%) (Auto) 0.1   %


 


Neutrophils # (Auto) 13.01  1.4-6.5  K/uL


 


Lymphocytes # (Auto) 1.26  1.2-3.4  K/uL


 


Monocytes # (Auto) 0.93  0.11-0.59  K/uL


 


Eosinophils # (Auto) 0.00  0-0.5  K/uL


 


Basophils # (Auto) 0.02  0-0.2  K/uL


 


RDW Standard Deviation 44.7  36.4-46.3  fL


 


RDW Coefficient of Variation 13.8  11.5-14.5  %


 


Immature Granulocyte % (Auto) 0.6   %


 


Immature Granulocyte # (Auto) 0.09  0.00-0.02  K/uL


 


Erythrocyte Sedimentation Rate 21  0-14  mm/hr


 


Prothrombin Time


  10.4


  


  9.0-12.0


SECONDS


 


Prothromb Time International


Ratio 1.0


  


  0.9-1.1  


 


 


Activated Partial


Thromboplast Time 31.1


  


  21.0-31.0


SECONDS


 


Partial Thromboplastin Ratio 1.2   


 


Urine Color DK YELLOW   


 


Urine Appearance CLEAR  CLEAR  


 


Urine pH 5.0  4.5-7.5  


 


Urine Specific Gravity 1.027  1.000-1.030  


 


Urine Protein 1+  NEG  


 


Urine Glucose (UA) NEG  NEG  


 


Urine Ketones TRACE  NEG  


 


Urine Occult Blood NEG  NEG  


 


Urine Nitrite NEG  NEG  


 


Urine Bilirubin NEG  NEG  


 


Urine Urobilinogen NEG  NEG  


 


Urine Leukocyte Esterase NEG  NEG  


 


Urine WBC (Auto) 1-5  0-5  /hpf


 


Urine RBC (Auto) 0-4  0-4  /hpf


 


Urine Hyaline Casts (Auto) 1-5  0-5  /lpf


 


Urine Epithelial Cells (Auto) 5-10  0-5  /lpf


 


Urine Bacteria (Auto) NEG  NEG  


 


Sodium Level 136  136-145  mmol/L


 


Potassium Level 3.9  3.5-5.1  mmol/L


 


Chloride Level 102    mmol/L


 


Carbon Dioxide Level 26  21-32  mmol/L


 


Anion Gap 7.0  3-11  mmol/L


 


Blood Urea Nitrogen 17  7-18  mg/dl


 


Creatinine


  1.00


  


  0.60-1.40


mg/dl


 


Est Creatinine Clear Calc


Drug Dose 71.0


  


   ml/min


 


 


Estimated GFR (


American) 88.0


  


   


 


 


Estimated GFR (Non-


American 75.9


  


   


 


 


BUN/Creatinine Ratio 16.6  10-20  


 


Random Glucose 112  70-99  mg/dl


 


Calcium Level 9.2  8.5-10.1  mg/dl


 


Magnesium Level 2.1  1.8-2.4  mg/dl


 


Total Bilirubin 0.8  0.2-1  mg/dl


 


Aspartate Amino Transf


(AST/SGOT) 19


  


  15-37  U/L


 


 


Alanine Aminotransferase


(ALT/SGPT) 24


  


  12-78  U/L


 


 


Alkaline Phosphatase 68    U/L


 


Total Creatine Kinase 73    U/L


 


Creatine Kinase MB < 1.0  0.5-3.6  ng/ml


 


Creatine Kinase MB Ratio   0-3.0  


 


Troponin I < 0.015  0-0.045  ng/ml


 


C-Reactive Protein 12.00  0-0.29  mg/dl


 


Total Protein 7.5  6.4-8.2  gm/dl


 


Albumin 3.9  3.4-5.0  gm/dl


 


Globulin 3.6  2.5-4.0  gm/dl


 


Albumin/Globulin Ratio 1.1  0.9-2  


 


Lipase 241    U/L


 


Lyme Disease IgG Antibody NEG  NEG  


 


Lyme Disease IgM Antibody NEG  NEG  


 


Bedside Lactic Acid Venous


  


  0.79


  0.90-1.70


mmol/L








Microbiology Results


8/2/18 Blood Culture, Received


         Pending


8/2/18 Blood Culture, Received


         Pending


8/2/18 Blood Culture, Received


         Pending


8/2/18 Blood Culture, Received


         Pending





Diagnostic Radiology


CXR:


IMPRESSION: No acute cardiopulmonary abnormality.





EKG


EKG: sinus tachycardia, rate 101





Impression


Assessment and Plan


Pt is 69 y/o M with PMH hypothyroidism, hyperlipidemia presented with C/O fever 

1 day and rash times couple weeks. 





FEVER/LEUKOCYTOSIS


RASH


R/O ENDOCARDITIS


Patient C/O weakness, anorexia, fever high of 102.2F started yesterday. Denies 

CP/SOB. History septic right olecranon bursitis in 3/2018, positive MRSA, S/P I&

D, had PICC line, treated with daptomycin and oral Bactrim with reported 

improvement in no known recurrent pain, erythema, edema, discharge from site.  

2 weeks ago started with erythematous painful lesion to left wrist which then 

progressed to left hand and left arm and also with some blisters with 

associated burning sensation in numbness of his fingertips and swelling of 

fingers.  Seen by PCP diagnosed with possible shingles and started on Valtrex 

and Lyrica, finished yesterday.


-Today in ER patient afebrile (taking ibuprofen for fever), P: 109-100, R: 18, 

/72, 95% on RA.  WBC: 15.  POC lactic acid WNL. UA: unremarkable.  ESR: 21

, CRP: 12.  Negative Lyme.  Negative CXR.  Given vancomycin in ER.


-Patient has petechiae like rash to left hand and splinter hemorrhages to left 

fingers


-Pending pro calcitonin


-Consider C. difficile studies if increased diarrhea


-Antibiotics per attending physician


-Cardiology consult, consider echo possible DARION


-CBC in a.m.





HYPOTHYROIDISM


-On levothyroxine





HLD


-On atorvastatin





DVT Prophylaxis


-Per attending physician





Admit telemetry


Full code as per discussion with pt


Follows with Dr Post for routine care





Pt was seen with Dr Calderon. See addendum for further assessment and plan





Resuscitation Status








VTE Prophylaxis


Will order VTE Prophylaxis:  Yes





Assessment/Plan


IM ATTENDING :





Patient seen and examined.  


History obtained from patient and records.  


Preceding documentation by Ms. Mary Ricardo PA-C reviewed.


 


In addition, serum procalcitonin was normal.





FINAL ASSESSMENT AND PLAN as follows :





1.  Possible sepsis


possible sources :


infective endocarditis.  possible splinter hemorrhages on the left upper 

extremity. (hx PICC line placement for MRSA septic olecranon bursitis sp Rx.)


diarrhea, rule out C. difficile.  (Recent antibiotic Rx given at [facility] 

ER.)





2.  Hypertension, stable.  


3.  Renal artery stenosis as per records.  


 





PCU RE (concerns for infective endocarditis)


Cultures.  Stool C. diff.


Hold off on antibiotics until DARION/CS results known 


Cardiology consult. RE Possible DARION in a.m.


(ER provider already in touch with Dr. Briggs.)


Consider autoimmune workup if workup does not support infective endocarditis as 

etiology of splinter hemorrhages.


DVT prophylaxis, Lovenox subQ. 


Full code.

## 2018-08-02 NOTE — DIAGNOSTIC IMAGING REPORT
SINGLE VIEW CHEST



CLINICAL HISTORY:  Sepsis.



FINDINGS: An AP, portable, upright chest radiograph is compared to study dated

3/27/2018. The examination is mildly degraded by portable technique and patient

rotation.  The heart is top normal for projection and there is mild

atherosclerotic calcification of the thoracic aorta. The pulmonary vasculature

is noncongested. There is mild bibasilar atelectasis. The lungs and pleural

spaces are otherwise clear. No pneumothorax is seen. The skeletal structures are

osteopenic. The bony thorax is grossly intact.



IMPRESSION: No acute cardiopulmonary abnormality.







Electronically signed by:  Tito Arreaga M.D.

8/2/2018 5:45 PM



Dictated Date/Time:  8/2/2018 5:45 PM

## 2018-08-03 VITALS — HEART RATE: 80 BPM | DIASTOLIC BLOOD PRESSURE: 85 MMHG | SYSTOLIC BLOOD PRESSURE: 133 MMHG | OXYGEN SATURATION: 97 %

## 2018-08-03 VITALS — HEART RATE: 75 BPM | SYSTOLIC BLOOD PRESSURE: 140 MMHG | OXYGEN SATURATION: 97 % | DIASTOLIC BLOOD PRESSURE: 86 MMHG

## 2018-08-03 VITALS — DIASTOLIC BLOOD PRESSURE: 93 MMHG | SYSTOLIC BLOOD PRESSURE: 150 MMHG | OXYGEN SATURATION: 97 % | HEART RATE: 77 BPM

## 2018-08-03 VITALS — DIASTOLIC BLOOD PRESSURE: 89 MMHG | OXYGEN SATURATION: 98 % | SYSTOLIC BLOOD PRESSURE: 137 MMHG | HEART RATE: 83 BPM

## 2018-08-03 VITALS
TEMPERATURE: 98.42 F | HEART RATE: 78 BPM | DIASTOLIC BLOOD PRESSURE: 70 MMHG | OXYGEN SATURATION: 98 % | SYSTOLIC BLOOD PRESSURE: 119 MMHG

## 2018-08-03 VITALS — OXYGEN SATURATION: 99 % | SYSTOLIC BLOOD PRESSURE: 123 MMHG | DIASTOLIC BLOOD PRESSURE: 90 MMHG | HEART RATE: 80 BPM

## 2018-08-03 VITALS — SYSTOLIC BLOOD PRESSURE: 136 MMHG | OXYGEN SATURATION: 96 % | HEART RATE: 80 BPM | DIASTOLIC BLOOD PRESSURE: 89 MMHG

## 2018-08-03 VITALS
HEART RATE: 82 BPM | SYSTOLIC BLOOD PRESSURE: 123 MMHG | DIASTOLIC BLOOD PRESSURE: 73 MMHG | OXYGEN SATURATION: 96 % | TEMPERATURE: 99.5 F

## 2018-08-03 VITALS — SYSTOLIC BLOOD PRESSURE: 141 MMHG | DIASTOLIC BLOOD PRESSURE: 91 MMHG | OXYGEN SATURATION: 96 % | HEART RATE: 74 BPM

## 2018-08-03 VITALS — OXYGEN SATURATION: 95 %

## 2018-08-03 VITALS — HEART RATE: 81 BPM | OXYGEN SATURATION: 97 % | SYSTOLIC BLOOD PRESSURE: 128 MMHG | DIASTOLIC BLOOD PRESSURE: 92 MMHG

## 2018-08-03 VITALS
OXYGEN SATURATION: 97 % | HEART RATE: 90 BPM | DIASTOLIC BLOOD PRESSURE: 83 MMHG | TEMPERATURE: 97.7 F | SYSTOLIC BLOOD PRESSURE: 127 MMHG

## 2018-08-03 VITALS
HEART RATE: 94 BPM | OXYGEN SATURATION: 95 % | SYSTOLIC BLOOD PRESSURE: 136 MMHG | DIASTOLIC BLOOD PRESSURE: 80 MMHG | TEMPERATURE: 101.3 F

## 2018-08-03 VITALS
HEART RATE: 87 BPM | SYSTOLIC BLOOD PRESSURE: 140 MMHG | TEMPERATURE: 98.96 F | DIASTOLIC BLOOD PRESSURE: 88 MMHG | OXYGEN SATURATION: 87 %

## 2018-08-03 LAB
BASOPHILS # BLD: 0.03 K/UL (ref 0–0.2)
BASOPHILS NFR BLD: 0.2 %
EOS ABS #: 0.02 K/UL (ref 0–0.5)
EOSINOPHIL NFR BLD AUTO: 173 K/UL (ref 130–400)
HBA1C MFR BLD: 5.4 % (ref 4.5–5.6)
HCT VFR BLD CALC: 43.1 % (ref 42–52)
HGB BLD-MCNC: 15 G/DL (ref 14–18)
IG#: 0.08 K/UL (ref 0–0.02)
IMM GRANULOCYTES NFR BLD AUTO: 8.2 %
LYMPHOCYTES # BLD: 1.13 K/UL (ref 1.2–3.4)
MCH RBC QN AUTO: 31.6 PG (ref 25–34)
MCHC RBC AUTO-ENTMCNC: 34.8 G/DL (ref 32–36)
MCV RBC AUTO: 90.9 FL (ref 80–100)
MONO ABS #: 1.07 K/UL (ref 0.11–0.59)
MONOCYTES NFR BLD: 7.8 %
NEUT ABS #: 11.41 K/UL (ref 1.4–6.5)
NEUTROPHILS # BLD AUTO: 0.1 %
NEUTROPHILS NFR BLD AUTO: 83.1 %
PMV BLD AUTO: 10.4 FL (ref 7.4–10.4)
RED CELL DISTRIBUTION WIDTH CV: 13.8 % (ref 11.5–14.5)
RED CELL DISTRIBUTION WIDTH SD: 45.2 FL (ref 36.4–46.3)
WBC # BLD AUTO: 13.74 K/UL (ref 4.8–10.8)

## 2018-08-03 RX ADMIN — SODIUM CHLORIDE AND POTASSIUM CHLORIDE SCH MLS/HR: 9; 1.49 INJECTION, SOLUTION INTRAVENOUS at 00:47

## 2018-08-03 RX ADMIN — VANCOMYCIN HYDROCHLORIDE SCH MLS/HR: 1 INJECTION, POWDER, LYOPHILIZED, FOR SOLUTION INTRAVENOUS at 12:22

## 2018-08-03 RX ADMIN — Medication SCH TAB: at 08:52

## 2018-08-03 RX ADMIN — Medication SCH MG: at 08:52

## 2018-08-03 RX ADMIN — LOPERAMIDE HYDROCHLORIDE PRN MG: 2 CAPSULE ORAL at 22:00

## 2018-08-03 RX ADMIN — VANCOMYCIN HYDROCHLORIDE SCH MLS/HR: 1 INJECTION, POWDER, LYOPHILIZED, FOR SOLUTION INTRAVENOUS at 23:53

## 2018-08-03 RX ADMIN — CEFTRIAXONE SCH MLS/HR: 2 INJECTION, POWDER, FOR SOLUTION INTRAMUSCULAR; INTRAVENOUS at 16:27

## 2018-08-03 RX ADMIN — SODIUM CHLORIDE AND POTASSIUM CHLORIDE SCH MLS/HR: 9; 1.49 INJECTION, SOLUTION INTRAVENOUS at 21:59

## 2018-08-03 RX ADMIN — LEVOTHYROXINE SODIUM SCH MCG: 125 TABLET ORAL at 06:30

## 2018-08-03 RX ADMIN — ENOXAPARIN SODIUM SCH MG: 40 INJECTION SUBCUTANEOUS at 20:32

## 2018-08-03 RX ADMIN — ATORVASTATIN CALCIUM SCH MG: 10 TABLET, FILM COATED ORAL at 08:52

## 2018-08-03 RX ADMIN — LORAZEPAM PRN MG: 2 INJECTION INTRAMUSCULAR; INTRAVENOUS at 22:00

## 2018-08-03 NOTE — MEDICAL CONSULT
Consultation


Date of Consultation:


Aug 3, 2018.


Attending Physician:


Ollie Coker M.D.


Reason for Consultation:


Recurrent fever, history of MRSA bursitis


History of Present Illness


68-year-old male known to me from previous consultation in March for MRSA 

associated septic olecranon bursitis, who presents now with several days of 

fevers, chills, weakness, and acute onset of painful erythematous/purplish 

lesions involving his left upper extremity and hands and fingers.  He was seen 

by another physician and thought at possibly have shingles, and was started on 

Valtrex.  Fever persisted, and patient was referred for further management.  He 

has been started on broad-spectrum antibiotics, and echocardiogram has been 

obtained which shows evidence of mitral valve vegetation.  Blood cultures have 

been drawn and are pending.  Patient has been started empirically on vancomycin 

and ceftriaxone.  Has had no new embolic type lesions since admission.  States 

he is feeling better since on antibiotics, temperature has improved.





Past Medical/Surgical History


Medical Problems:


(1) Fever


Status: Acute  





(2) Kidney stone


Status: Acute  





(3) Rash


Status: Acute  





(4) Renal colic


Status: Acute  





(5) Right flank pain


Status: Acute  








Medical Problems:


(1) Cellulitis of right elbow


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


(3) HLD (hyperlipidemia)


(4) Hypothyroidism


(5) Olecranon bursitis


(6) Renal colic


(7) Sepsis


(8) Sepsis


Surgical Problems:


(1) History of total bilateral knee replacement











Family History





FH: CHF (congestive heart failure)


  SISTER


FH: HTN (hypertension)


  BROTHER


  SISTER


No significant history


  FATHER


  MOTHER





Social History


Smoking Status:  Never Smoker


Smokeless Tobacco Use:  No


Alcohol Use:  none


Drug Use:  none


Marital Status:  


Housing Status:  lives with family


Occupation Status:  retired





Allergies


Coded Allergies:  


     No Known Allergies (Unverified , 8/2/18)





Current Inpatient Medications





Current Inpatient Medications








 Medications


  (Trade)  Dose


 Ordered  Sig/Mera


 Route  Start Time


 Stop Time Status Last Admin


Dose Admin


 


 Enoxaparin Sodium


  (Lovenox Inj)  40 mg  Q24H


 SC  8/2/18 21:00


 9/1/18 20:59   


 


 


 Potassium


 Chloride/Sodium


 Chloride  1,000 ml @ 


 60 mls/hr  [id number]


 IV  8/2/18 23:45


 9/1/18 23:44  8/3/18 00:47


60 MLS/HR


 


 Acetaminophen


  (Tylenol Tab)  650 mg  Q4H  PRN


 PO  8/2/18 22:15


 9/1/18 22:14   


 


 


 Nitroglycerin


  (Nitrostat Tab)  0.4 mg  UD  PRN


 SL  8/2/18 22:15


 9/1/18 22:14   


 


 


 Aspirin


  (Ecotrin Tab)  81 mg  DAILY


 PO  8/3/18 09:00


 9/2/18 08:59  8/3/18 08:52


81 MG


 


 Atorvastatin


 Calcium


  (Lipitor Tab)  10 mg  QAM


 PO  8/3/18 09:00


 9/2/18 08:59  8/3/18 08:52


10 MG


 


 Levothyroxine


 Sodium


  (Synthroid Tab)  125 mcg  DAILYBB


 PO  8/3/18 06:00


 9/2/18 06:59  8/3/18 06:30


125 MCG


 


 Multivitamins/


 Minerals


  (Multivitamin W/


 Minerals Tab)  1 tab  QAM


 PO  8/3/18 09:00


 9/2/18 08:59  8/3/18 08:52


1 TAB


 


 Tramadol HCl


  (Ultram Tab)  25 mg  Q6H  PRN


 PO  8/2/18 22:15


 9/1/18 22:14   


 


 


 Prochlorperazine


 Edisylate 5 mg/


 Syringe  5 ml @ 5


 mls/min  Q6H  PRN


 IV  8/2/18 22:15


 9/1/18 22:14   


 


 


 Lorazepam


  (Ativan Inj)  0.5 mg  Q4H  PRN


 IV  8/2/18 22:15


 9/1/18 22:14   


 


 


 Morphine Sulfate


  (MoRPHine


 SULFATE INJ)  4 mg  Q3H  PRN


 IV  8/2/18 22:15


 8/16/18 22:14   


 


 


 Vancomycin HCl


  (Consult)  1 ea  UD  PRN


 N/A  8/4/18 09:00


 9/3/18 08:59   


 


 


 Vancomycin HCl


 1500 mg/Sodium


 Chloride  530 ml @ 


 200 mls/hr  Q12H


 IV  8/3/18 12:00


 9/14/18 11:59  8/3/18 12:22


200 MLS/HR











Review of Systems


Constitutional:  + fever, + chills, + sweats, + weakness, + fatigue


Eyes:  No problem reported


ENT:  No problem reported


Respiratory:  No problem reported


Cardiovascular:  No problem reported


Abdomen:  + nausea


Musculoskeletal:  + joint pain, + muscle pain


Genitourinary - Male:  No problem reported


Neurologic:  + problem reported (See HPI)


Psychiatric:  No problem reported


Endocrine:  No problem reported


Hematologic / Lymphatic:  No problem reported


Integumentary:  + new/changing skin lesions


Allergic / Immunologic:  No problem reported





Physical Exam











  Date Time  Temp Pulse Resp B/P (MAP) Pulse Ox O2 Delivery O2 Flow Rate FiO2


 


8/3/18 13:00 36.5 90 18 127/83 (98) 97 Room Air  


 


8/3/18 12:11 37.2 87 18 140/88 (105) 87   


 


8/3/18 11:49  82 16 133/86 (102) 96 Room Air  


 


8/3/18 11:39  80 14 131/86 (101) 93 Room Air  


 


8/3/18 11:29  83 16 137/87 (104) 94 Room Air  


 


8/3/18 11:19  83 12 133/87 (102) 97 Nasal Cannula 2 


 


8/3/18 11:17      Nasal Cannula 2 


 


8/3/18 11:15  77 20 150/93 97 Nasal Cannula 2 


 


8/3/18 11:10  74 16 141/91 96 Nasal Cannula 2 


 


8/3/18 11:05  80 16 133/85 97 Nasal Cannula 2 


 


8/3/18 11:00  75 20 140/86 97 Nasal Cannula 2 


 


8/3/18 10:55  80 12 136/89 96 Nasal Cannula 2 


 


8/3/18 10:53      Nasal Cannula 2 


 


8/3/18 10:50  81 18 128/92 97 Nasal Cannula 2 


 


8/3/18 10:45  80 16 123/90 99 Nasal Cannula 2 


 


8/3/18 10:43  83 16 137/89 98 Nasal Cannula 2 


 


8/3/18 09:59 37.2 81 12 131/86 (101) 97 Room Air  


 


8/3/18 08:00      Room Air  


 


8/3/18 04:23      Room Air  


 


8/3/18 03:04 37.5 82 18 123/73 (90) 96 Room Air  


 


8/2/18 23:35 36.8 83 18 146/100 96 Room Air  


 


8/2/18 22:16 37.2 88 18 122/72 95 Room Air  


 


8/2/18 20:16  100 18 122/72 95 Room Air  


 


8/2/18 18:19  88 18 125/76 95 Room Air  


 


8/2/18 18:00     95 Room Air  


 


8/2/18 17:08 37.3 109 18 140/81 95 Room Air  








General Appearance:  WD/WN, no apparent distress


Head:  normocephalic, atraumatic


Eyes:  normal inspection, EOMI, sclerae normal


ENT:  normal ENT inspection, hearing grossly normal, pharynx normal


Neck:  supple, no adenopathy, thyroid normal, trachea midline


Respiratory/Chest:  chest non-tender, lungs clear, normal breath sounds, no 

respiratory distress


Cardiovascular:  regular rate, rhythm, no gallop, no murmur


Abdomen/GI:  normal bowel sounds, non tender, soft, no organomegaly


Back:  normal inspection, no CVA tenderness


Extremities/Musculoskelatal:  no calf tenderness, normal capillary refill, non-

tender


Neurologic/Psych:  alert, normal mood/affect, oriented x 3


Skin:  normal color, + pertinent finding (Multiple purpuric lesions involving 

left hand, wrists, splinter hemorrhages left finger beds)


Lymphatic:  no adenopathy





Laboratory Results











 Date/Time


Source Procedure


Growth Status


 


 


 8/2/18 18:08


Blood Blood Culture


Pending Received


 


 8/2/18 18:00


Blood Blood Culture


Pending Received


 


 8/2/18 17:50


Blood Blood Culture


Pending Received


 


 8/2/18 17:40


Blood Blood Culture


Pending Received


 


 8/3/18 00:02


Stool C.difficile Toxin B Gene (PCR) - Final


No C. difficile toxin B gene detected Complete








Last 24 Hours








Test


  8/2/18


18:00 8/2/18


18:18 8/3/18


07:41


 


White Blood Count 15.31 K/uL   13.74 K/uL 


 


Red Blood Count 5.28 M/uL   4.74 M/uL 


 


Hemoglobin 17.1 g/dL   15.0 g/dL 


 


Hematocrit 47.9 %   43.1 % 


 


Mean Corpuscular Volume 90.7 fL   90.9 fL 


 


Mean Corpuscular Hemoglobin 32.4 pg   31.6 pg 


 


Mean Corpuscular Hemoglobin


Concent 35.7 g/dl 


  


  34.8 g/dl 


 


 


Platelet Count 186 K/uL   173 K/uL 


 


Mean Platelet Volume 10.5 fL   10.4 fL 


 


Neutrophils (%) (Auto) 85.0 %   83.1 % 


 


Lymphocytes (%) (Auto) 8.2 %   8.2 % 


 


Monocytes (%) (Auto) 6.1 %   7.8 % 


 


Eosinophils (%) (Auto) 0.0 %   0.1 % 


 


Basophils (%) (Auto) 0.1 %   0.2 % 


 


Neutrophils # (Auto) 13.01 K/uL   11.41 K/uL 


 


Lymphocytes # (Auto) 1.26 K/uL   1.13 K/uL 


 


Monocytes # (Auto) 0.93 K/uL   1.07 K/uL 


 


Eosinophils # (Auto) 0.00 K/uL   0.02 K/uL 


 


Basophils # (Auto) 0.02 K/uL   0.03 K/uL 


 


RDW Standard Deviation 44.7 fL   45.2 fL 


 


RDW Coefficient of Variation 13.8 %   13.8 % 


 


Immature Granulocyte % (Auto) 0.6 %   0.6 % 


 


Immature Granulocyte # (Auto) 0.09 K/uL   0.08 K/uL 


 


Erythrocyte Sedimentation Rate 21 mm/hr   


 


Prothrombin Time 10.4 SECONDS   


 


Prothromb Time International


Ratio 1.0 


  


  


 


 


Activated Partial


Thromboplast Time 31.1 SECONDS 


  


  


 


 


Partial Thromboplastin Ratio 1.2   


 


Urine Color DK YELLOW   


 


Urine Appearance CLEAR   


 


Urine pH 5.0   


 


Urine Specific Gravity 1.027   


 


Urine Protein 1+   


 


Urine Glucose (UA) NEG   


 


Urine Ketones TRACE   


 


Urine Occult Blood NEG   


 


Urine Nitrite NEG   


 


Urine Bilirubin NEG   


 


Urine Urobilinogen NEG   


 


Urine Leukocyte Esterase NEG   


 


Urine WBC (Auto) 1-5 /hpf   


 


Urine RBC (Auto) 0-4 /hpf   


 


Urine Hyaline Casts (Auto) 1-5 /lpf   


 


Urine Epithelial Cells (Auto) 5-10 /lpf   


 


Urine Bacteria (Auto) NEG   


 


Sodium Level 136 mmol/L   


 


Potassium Level 3.9 mmol/L   


 


Chloride Level 102 mmol/L   


 


Carbon Dioxide Level 26 mmol/L   


 


Anion Gap 7.0 mmol/L   


 


Blood Urea Nitrogen 17 mg/dl   


 


Creatinine 1.00 mg/dl   


 


Est Creatinine Clear Calc


Drug Dose 71.0 ml/min 


  


  


 


 


Estimated GFR (


American) 88.0 


  


  


 


 


Estimated GFR (Non-


American 75.9 


  


  


 


 


BUN/Creatinine Ratio 16.6   


 


Random Glucose 112 mg/dl   


 


Estimated Average Glucose 108 mg/dl   


 


Hemoglobin A1c 5.4 %   


 


Calcium Level 9.2 mg/dl   


 


Magnesium Level 2.1 mg/dl   


 


Total Bilirubin 0.8 mg/dl   


 


Aspartate Amino Transf


(AST/SGOT) 19 U/L 


  


  


 


 


Alanine Aminotransferase


(ALT/SGPT) 24 U/L 


  


  


 


 


Alkaline Phosphatase 68 U/L   


 


Total Creatine Kinase 73 U/L   


 


Creatine Kinase MB < 1.0 ng/ml   


 


Creatine Kinase MB Ratio    


 


Troponin I < 0.015 ng/ml   


 


C-Reactive Protein 12.00 mg/dl   


 


Total Protein 7.5 gm/dl   


 


Albumin 3.9 gm/dl   


 


Globulin 3.6 gm/dl   


 


Albumin/Globulin Ratio 1.1   


 


Lipase 241 U/L   


 


Procalcitonin 0.12 ng/ml   


 


Lyme Disease IgG Antibody NEG   


 


Lyme Disease IgM Antibody NEG   


 


Bedside Lactic Acid Venous  0.79 mmol/L  











Assessment & Plan


68-year-old male with episode of MRSA associated olecranon bursitis several 

months ago with apparent resolution, now presents with febrile illness with 

echocardiogram consistent with endocarditis and physical picture of multiple 

emboli to the left arm and hand.  Ceftriaxone will be added to vancomycin 

pending blood culture results to cover potential of gram-negative pathogen such 

as HACEK organisms.  Watch closely for further embolic phenomena.  Will follow.

## 2018-08-03 NOTE — POST SEDATION ASSESSMENT
Post Sedation Assessment


General


Date of Sedation


Aug 3, 2018.


Vital Signs:





Vital Signs Past 12 Hours








  Date Time  Temp Pulse Resp B/P (MAP) Pulse Ox O2 Delivery O2 Flow Rate FiO2


 


8/3/18 11:29  83 16 137/87 (104) 94 Room Air  


 


8/3/18 11:19  83 12 133/87 (102) 97 Nasal Cannula 2 


 


8/3/18 09:59 37.2 81 12 131/86 (101) 97 Room Air  


 


8/3/18 08:00      Room Air  


 


8/3/18 04:23      Room Air  


 


8/3/18 03:04 37.5 82 18 123/73 (90) 96 Room Air  











Post Procedure Recovery Score


Activity:  (2) Moves 4 extremities *


Respiration:  (2) Deep breath/cough


Circulation:  (2) +/-20% PreAnes Value


Consciousness:  (2) Fully Awake


Oxygen Saturation:  (2) > 92% On Room Air


Post Anesthesia Score:  10





Discharge Sedation


Level of Care:  Fast Track Phase II





Post Sedation Plan


On clinical assessment, the patient appears to have tolerated the sedation 

without complications. Patient is recovering as anticipated.





Patient will continue to be monitored by nursing and may be discharged when 

sedation discharge criteria are met per below protocol. 





Upon Completions of procedure and additional 15 minutes continue every 5 minute 

vital signs and the P.A.R. score; then discharge to a Phase I or Fast Track to 

Phase II per the following guidelines:





* Discharge Patient to appropriate Phase II area if PAR is 8 or greater or 

return to pre- procedure baseline.  The post - procedure orders will be as 

directed. 





* If PAR score is less than 8 or not return to pre-procedure baseline then 

patient will follow Phase I monitoring till PAR is reached for Phase II.  The 

Phase I may be done in procedure room or may call to secure a Phase I area.





* If naloxone or flumazenil are used for reversal, hold in Phase I for an 

additional 60 -120 minutes before discharge to Phase II.  Please call the 

Sedation Physician to re-evaluate and complete post-note for discharge to Phase 

II area. 





Do NOT discharge from procedure sedation or Phase 1 until post- sedation 

evaluation note is complete by procedure /sedation MD





Sedation Discharge Instructions to be given to the patient at discharge to home.

## 2018-08-03 NOTE — CARDIOLOGY CONSULTATION
Cardiology Consultation


Date of Consultation:  Aug 3, 2018


History of Present Illness


Santos Colorado is a 68 year old male seen in cardiology consultation per the 

request of Dr. Lyons for recent episode of fevers, and concern for 

endocarditis.


The patient's recent medical history dates back to March, 2018 when he had been 

found to have an inflamed left elbow.  He underwent surgical intervention with 

orthopedic surgery for findings of a septic right olecranon bursitis with 

intraoperative cultures yielding MRSA.  Per the infectious disease note, it 

appears the initial plan was for the patient to be discharged on 1 week of IV 

daptomycin therapy and then he was to be transitioned to Bactrim for another 

week.  The patient states however that upon outpatient follow-up at that time 

he had been having persistent drainage from his right elbow and therefore he 

recalls being placed on a prolonged course of oral Bactrim as directed by Dr. Byrne of orthopedics and then by Dr. Blair of infectious disease.  The patient 

believes he may have been on Bactrim for an interval of up to 2 months at which 

time the drainage had apparently improved.





He has had an ulceration on the radial aspect of his left wrist that has been 

slow to heal for the last few weeks.





The patient has a long-standing history of a cystic abnormality at the 

posterior portion of the shoulder adjacent to his trapezius muscle.  This has 

not been painful in the past.  He recently started using a new tool belt that 

included suspenders that ran over this area of the shoulder, but he did not 

think this initially had caused him discomfort.  He had gone on vacation 

recently in [place] and developed severe left shoulder pain at the site 

of this chronic cystic abnormality that seem to be more enlarged.  He was seen 

in the emergency department at [facility] in [facility] 

on 7/7/17 he describes being treated with a course of ibuprofen and oral 

antibiotic.





He returned home and subsequently improved in terms of his shoulder pain.  He 

then however developed a blistering skin eruption limited to his left hand in 

between his fingers and on the hand but not anywhere else on his body.  Been 

seen by family practice, there is concern that perhaps this was a shingles 

outbreak.





The skin eruption however worsened, and starting 2 days ago he started having 

fevers and debilitating chills.  He therefore had slept for hours.  Yesterday 

he had recurrent subjective fever prompting evaluation at [facility] 

practice at [facility].  He was referred to the emergency room for further 

assessment.


 


The patient was seen by the undersigned in room 235-1 on the telemetry floor.


He was comfortable.  He has had subjective warmth overnight, but no objective 

fever on his vital signs.  He denies any chest pain or shortness of breath.  He 

does have diarrhea that is started over the last few days.





Past Medical/Surgical History


Problem List:


Medical History:


(1) septic bursitis of the right elbow, March, 2018, with intraoperative 

cultures yielding MRSA


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


(3) HLD (hyperlipidemia)


(4) Hypothyroidism


(5)  Renal colic








Surgical History:


(1) History of total bilateral knee replacement


(2) surgical drainage of olecranon bursitis


(3) cystoscopy





History





Social History:  


The patient is a retired educator.


He lives with his spouse, Nelly.


His daughter, Dorita accompanies him at the bedside.  He is a non-smoker.


  





Family History: 


No family history of heart disease.





Review Of Systems


General: Positive for fevers chills heavy perspiration as noted in the HPI


Head: The patient denies headache and prior head trauma.


Cardiovascular: The patient denies chest pain or chest discomfort, dyspnea on 

exertion, palpitations, PND, orthopnea, edema, spontaneous shortness of breath, 

syncope and near syncope.


Pulmonary: The patient denies cough, wheeze, pleurisy, hemoptysis, sputum, and 

excessive snoring.


Gastrointestinal: Positive for recent diarrhea, no bloody bowel movements


Skin: Positive for blistering eruption of the left hand


Musculoskeletal: Positive for past elbow pain which is resolved, recent 

shoulder pain.


Neurological: The patient denies prior stroke and seizures, or recent 

neurologic symptoms


Point review of systems is reviewed and is otherwise negative





Allergies


Coded Allergies:  


     No Known Allergies (Unverified , 8/2/18)





Medications





Reported Home Medications








 Medications  Dose


 Route/Sig


 Max Daily Dose Days Date Category


 


 Aspirin 81


  (Aspirin) 81 Mg


 Tab  81 Mg


 PO DAILY


    8/2/18 Reported


 


 Melatonin


  (Melatonin-Pyridoxine)


 1 Tab Tab  200 Mcg


 PO HS PRN


    3/27/18 Reported


 


 Lipitor


  (Atorvastatin


 Calcium) 10 Mg Tab  10 Mg


 PO QAM


    9/10/17 Reported


 


 Levothyroxine


 Sodium 125 Mcg Tab  125 Mcg


 PO QAM


    10/18/13 Reported


 


 Mvi With Minerals


  (Multivitamins/Minerals)


 Tab  1 Tab


 PO QAM


    9/4/09 Reported











Physical Exam


Vital Signs (Last 8hrs):





Last 8 Hrs








  Date Time  Temp Pulse Resp B/P (MAP) Pulse Ox O2 Delivery O2 Flow Rate FiO2


 


8/3/18 04:23      Room Air  


 


8/3/18 03:04 37.5 82 18 123/73 (90) 96 Room Air  








General Appearance: Alert and Oriented x3. NAD. 


Head: Normocephalic Atraumatic. 


Eyes: PERRLA, EOMI, conjunctiva and sclera clear


Neck:  Supple. No carotid bruits noted. No JVD. No HJD. 


Respiratory: Breath sounds clear to auscultation bilaterally. No w/r/r. 


Cardiovascular: Reg rate and rhythm. S1 and S2 noted. No murmurs, rubs, 

gallops. PMI non displace. 


Abdomen: Normal bowel sounds, soft nontender. no abdominal bruits. 


Extremities: No edema, no clubbing or cyanosis.  There is a healing ulcer over 

the radial aspect of the left wrist, there are healing purpuric blistering 

lesions of the left hand


Neuro:  No focal deficits. 


Psychiatric: Normal affect.





Data


Last Resulted


8/3/18 07:41








Red Blood Count 4.74, Mean Corpuscular Volume 90.9, Mean Corpuscular Hemoglobin 

31.6, Mean Corpuscular Hemoglobin Concent 34.8, Mean Platelet Volume 10.4, 

Neutrophils (%) (Auto) 83.1, Lymphocytes (%) (Auto) 8.2, Monocytes (%) (Auto) 

7.8, Eosinophils (%) (Auto) 0.1, Basophils (%) (Auto) 0.2, Neutrophils # (Auto) 

11.41, Lymphocytes # (Auto) 1.13, Monocytes # (Auto) 1.07, Eosinophils # (Auto) 

0.02, Basophils # (Auto) 0.03





Last Resulted


8/2/18 18:00











Past 24 Hours








Test


  8/2/18


18:00 Range/Units


 


 


Creatine Kinase MB < 1.0  0.5-3.6  ng/ml


 


Creatine Kinase MB Ratio   0-3.0  


 


Prothromb Time International


Ratio 1.0 


  0.9-1.1  


 


 


Prothrombin Time


  10.4 


  9.0-12.0


SECONDS


 


Total Creatine Kinase 73    U/L


 


Troponin I < 0.015  0-0.045  ng/ml








EKG performed 8/2/2018 and reviewed independently revealed sinus tachycardia 

101 bpm.





Preliminary thoracic echocardiogram summary 8/3/2018:


There is normal left ventricular wall thickness.


The left ventricular wall motion is normal.


The LV ejection Fraction = 60-65%.


Mild aortic regurgitation.


There is a small linear mobile echodensity, anterior to the anterior portion of 

the mitral valve annulus measuring 0.8 cm x 0.2 cm (image 98). This could be 

artifactual, however given clinical history, agitation must be considered, and 

the transesophageal echocardiogram is recommended for further assessment.





Assessment & Plan


Impression:


68-year-old male


 Recent febrile illness, with associated purpuric blistering rash limited to 

left hand, past history of MRSA olecranon bursitis








Discussion recommendations:


The patient underwent a transthoracic echocardiogram today with a small linear 

echodensity adherent to the anterior mitral annulus as outlined above.  Given 

his clinical history, recommend further assessment with transesophageal 

echocardiogram.  Continue antibiotics, vancomycin for now.  Blood cultures have 

been drawn x 4.


Patient is afebrile, and nontoxic in appearance.  The lesions on his hand are 

actually resolving.


Given his complex past infectious history for which he is previously followed 

with Dr. Blair of infectious disease, I have placed a consult for Dr. Blair and I 

discussed the case with him by telephone.


To proceed with transesophageal echocardiogram today.  Patient is n.p.o. except 

for meds.

## 2018-08-03 NOTE — PROGRESS NOTE
Medicine Progress Note


Date & Time of Visit:


Aug 3, 2018 at 18:21.


Subjective


Pt was seen and examined 


Lying in bed with no distress


Pt  continues to have fever


He had DARION done today


Denies any chest pain, palpitation, dizziness and SOB





Objective





Last 8 Hrs








  Date Time  Temp Pulse Resp B/P (MAP) Pulse Ox O2 Delivery O2 Flow Rate FiO2


 


8/3/18 16:23 38.5 94 20 136/80 (98) 95 Room Air  


 


8/3/18 16:00     95 Room Air  


 


8/3/18 13:00 36.5 90 18 127/83 (98) 97 Room Air  


 


8/3/18 12:11 37.2 87 18 140/88 (105) 87   


 


8/3/18 11:49  82 16 133/86 (102) 96 Room Air  


 


8/3/18 11:39  80 14 131/86 (101) 93 Room Air  


 


8/3/18 11:29  83 16 137/87 (104) 94 Room Air  


 


8/3/18 11:19  83 12 133/87 (102) 97 Nasal Cannula 2 


 


8/3/18 11:17      Nasal Cannula 2 


 


8/3/18 11:15  77 20 150/93 97 Nasal Cannula 2 


 


8/3/18 11:10  74 16 141/91 96 Nasal Cannula 2 


 


8/3/18 11:05  80 16 133/85 97 Nasal Cannula 2 


 


8/3/18 11:00  75 20 140/86 97 Nasal Cannula 2 


 


8/3/18 10:55  80 12 136/89 96 Nasal Cannula 2 


 


8/3/18 10:53      Nasal Cannula 2 


 


8/3/18 10:50  81 18 128/92 97 Nasal Cannula 2 


 


8/3/18 10:45  80 16 123/90 99 Nasal Cannula 2 


 


8/3/18 10:43  83 16 137/89 98 Nasal Cannula 2 








Physical Exam:


General- No acute distress 


Head-  atraumatic


Eyes- PERRL, EOMI


ENT- oropharynx clear


Neck- supple, no JVD


Lungs- clear to auscultation 


Heart- regular rhythm; no murmur


Abdomen- normal bowel sounds, soft


Extremities-no calf tenderness


Neuro- alert, oriented x 3; PERRL, EOMI; no facial palsy


Skin- warm & dry


Laboratory Results:





Last 24 Hours








Test


  8/3/18


07:41


 


White Blood Count 13.74 K/uL 


 


Red Blood Count 4.74 M/uL 


 


Hemoglobin 15.0 g/dL 


 


Hematocrit 43.1 % 


 


Mean Corpuscular Volume 90.9 fL 


 


Mean Corpuscular Hemoglobin 31.6 pg 


 


Mean Corpuscular Hemoglobin


Concent 34.8 g/dl 


 


 


Platelet Count 173 K/uL 


 


Mean Platelet Volume 10.4 fL 


 


Neutrophils (%) (Auto) 83.1 % 


 


Lymphocytes (%) (Auto) 8.2 % 


 


Monocytes (%) (Auto) 7.8 % 


 


Eosinophils (%) (Auto) 0.1 % 


 


Basophils (%) (Auto) 0.2 % 


 


Neutrophils # (Auto) 11.41 K/uL 


 


Lymphocytes # (Auto) 1.13 K/uL 


 


Monocytes # (Auto) 1.07 K/uL 


 


Eosinophils # (Auto) 0.02 K/uL 


 


Basophils # (Auto) 0.03 K/uL 


 


RDW Standard Deviation 45.2 fL 


 


RDW Coefficient of Variation 13.8 % 


 


Immature Granulocyte % (Auto) 0.6 % 


 


Immature Granulocyte # (Auto) 0.08 K/uL 














 Date/Time


Source Procedure


Growth Status


 


 


 8/3/18 00:02


Stool C.difficile Toxin B Gene (PCR) - Final


No C. difficile toxin B gene detected Complete











Assessment & Plan


ENDOCARDITIS


Present with fever/chills and osler's nodes/Janeway lesions on the hand


Recently completed treatment for shingles


Hx of right septic olecranon bursitis in 3/18 with MRSA


ECHO showed small linear echodensity noted in the long axis view on the 

noncoronary cusp of aortic valve


DARION performed today by Dr. Briggs showed small mobile echodensity on the 

mitral valve consistent with small vegetation. and small linear echodensity on 

the aortic valve.


ID on board recommended to continue Vanco and adding IV rocephin


Procalcitonin normal


Blood cx pending


Will need a long course IV abx treatment


Will get a PICC line once blood cx showed no growth 


Will need a repeat DARION in 4-6 weeks to ensure stability.








HYPOTHYROIDISM


On levothyroxine





HLD


On atorvastatin





DVT Prophylaxis


On Lovenox sub q





Code Status FULL CODE


Current Inpatient Medications:





Current Inpatient Medications








 Medications


  (Trade)  Dose


 Ordered  Sig/Mera


 Route  Start Time


 Stop Time Status Last Admin


Dose Admin


 


 Enoxaparin Sodium


  (Lovenox Inj)  40 mg  Q24H


 SC  8/2/18 21:00


 9/1/18 20:59   


 


 


 Potassium


 Chloride/Sodium


 Chloride  1,000 ml @ 


 60 mls/hr  [id number]


 IV  8/2/18 23:45


 9/1/18 23:44  8/3/18 00:47


60 MLS/HR


 


 Acetaminophen


  (Tylenol Tab)  650 mg  Q4H  PRN


 PO  8/2/18 22:15


 9/1/18 22:14  8/3/18 16:27


650 MG


 


 Nitroglycerin


  (Nitrostat Tab)  0.4 mg  UD  PRN


 SL  8/2/18 22:15


 9/1/18 22:14   


 


 


 Aspirin


  (Ecotrin Tab)  81 mg  DAILY


 PO  8/3/18 09:00


 9/2/18 08:59  8/3/18 08:52


81 MG


 


 Atorvastatin


 Calcium


  (Lipitor Tab)  10 mg  QAM


 PO  8/3/18 09:00


 9/2/18 08:59  8/3/18 08:52


10 MG


 


 Levothyroxine


 Sodium


  (Synthroid Tab)  125 mcg  DAILYBB


 PO  8/3/18 06:00


 9/2/18 06:59  8/3/18 06:30


125 MCG


 


 Multivitamins/


 Minerals


  (Multivitamin W/


 Minerals Tab)  1 tab  QAM


 PO  8/3/18 09:00


 9/2/18 08:59  8/3/18 08:52


1 TAB


 


 Tramadol HCl


  (Ultram Tab)  25 mg  Q6H  PRN


 PO  8/2/18 22:15


 9/1/18 22:14   


 


 


 Prochlorperazine


 Edisylate 5 mg/


 Syringe  5 ml @ 5


 mls/min  Q6H  PRN


 IV  8/2/18 22:15


 9/1/18 22:14   


 


 


 Lorazepam


  (Ativan Inj)  0.5 mg  Q4H  PRN


 IV  8/2/18 22:15


 9/1/18 22:14   


 


 


 Morphine Sulfate


  (MoRPHine


 SULFATE INJ)  4 mg  Q3H  PRN


 IV  8/2/18 22:15


 8/16/18 22:14   


 


 


 Vancomycin HCl


  (Consult)  1 ea  UD  PRN


 N/A  8/4/18 09:00


 9/3/18 08:59   


 


 


 Vancomycin HCl


 1500 mg/Sodium


 Chloride  530 ml @ 


 200 mls/hr  Q12H


 IV  8/3/18 12:00


 9/14/18 11:59  8/3/18 12:22


200 MLS/HR


 


 Ceftriaxone


 Sodium 2000 mg/


 Dextrose  70 ml @ 


 100 mls/hr  Q24H


 IV  8/3/18 16:30


 9/14/18 16:29  8/3/18 16:27


100 MLS/HR

## 2018-08-03 NOTE — TEE
*NOTICE TO RECEIVING PARTY AGENCY**  This information is strictly Confidential and 
protected under [place] law.  [place] law prohibits you from making any further 
disclosure of this information unless further disclosure is expressly permitted by the 
written consent of the person to whom it pertains or is authorized by law.  A general 
authorization for the release of medical or other information is not sufficient for this 
purpose.  Hospital accepts no responsibility if the information is made available to any 
other person, INCLUDING THE PATIENT.



Interpretation Summary

  *  Name: GOVIND MACHUCA  Study Date: 2018 09:44 AM  BP: 137/87 mmHg

  *  MRN: [id number]  Patient Location: .2T\S\S235\S\1  HR: 76

  *  : 1950 (M/d/yyyy)  Gender: Male  Height: 70 in

  *  Age: 68 yrs  Ethnicity: [place]  Weight: 184 lb

  *  Ordering Physician: Brandan Briggs

  *  Referring Physician: Self, Referred

  *  Performed By: Yun Quick RDCS

  *  Accession# [id number]  Account# [id number]

  *  Reason For Study: Endocarditis

  *  BSA: 2.0 m2

  *  Start time:  10:43 am

  *  End time:  11:17 am

  *  -- Conclusions --

  *  Study was technically adequate for the referral indication.

  *  There is borderline to mild prolapse of the posterior mitral valve leaflet.

  *  There is trace mitral regurgitation.

  *  There is a small mobile linear echodensity adherent to the atrial aspect of the 
posterior mitral valve leaflet.

  *  Taking the patient's clinical history into consideration, findings consistent with a 
small vegetation.

  *  There is no evidence of valvular perforation or abscess.

  *  The aortic valve is trileaflet.

  *  Mild aortic valve sclerosis is present.

  *  Mild aortic regurgitation.

  *  No hemodynamically significant valvular aortic stenosis.

  *  There is a small linear echodensity noted in the long axis view on the noncoronary 
cusp of the aortic valve consistent with a nonvascular strand versus vegetation.

Procedure Details

  *  The transesophageal portion of this study was personally supervised by the 
undersigned interpreting physician.

  *  DARION Probe #2 utilized for procedure.

  *  The study was performed in Cardiopulmonary Department.

  *  Time out was conducted by the physician, nurse, and echo tech with positive 
identification of patient and procedure.

  *  Informed consent for Transesophageal Echocardiogram was obtained prior to the 
procedure.

  *  An intravenous line was placed. A topical anesthetic agent was used for oropharangeal 
anesthesia. A bite block was inserted.

  *  The patient's vital signs, including blood pressure, heart rate, pulse oximetry and 
cardiac rhythm were monitored throughout the procedure .

  *  Fentanyl 100 mcg was administered for procedural sedation.

  *  Midazolam 3 mg administered for sedation.

  *  The posterior oropharynx was anesthetized using a topical anesthetic spray. A bite 
guard was inserted.

  *  A multifrequency, multiplane transesopheageal echocardiographic endoscope was 
inserted and manipulated in the standard fashion to achieve multiplane views.

  *  The transesophageal probe was passed without difficulty.

  *  The usual views were obtained; basal, mid-esophageal, transgastric and aortic views.

  *  The patient tolerated the procedure well without evidence of orophangeal or 
esophageal trauma.

  *  A 2D transesophageal echocardiogram with spectral and color flow Doppler was 
performed.

  *  Contrast injection with agitated saline was performed.

  *  A 2D transesophageal echocardiogram with Doppler and color flow Doppler was 
performed.

Left Ventricle

  *  The left ventricle is normal in size.

  *  There is normal left ventricular wall thickness.

  *  Left ventricular systolic function is normal.

  *  Ejection Fraction = 60-65%.

  *  The left ventricular wall motion is normal.

Right Ventricle

  *  The right ventricle is normal in size and function.

Atria

  *  The left atrial size is normal.

  *  The left atrial appendage was not assessed.

  *  Right atrial size is normal.

  *  The interatrial septum is intact with no evidence for an atrial septal defect.

Mitral Valve

  *  There is borderline to mild prolapse of the posterior mitral valve leaflet.

  *  There is no mitral valve stenosis.

  *  There is trace mitral regurgitation.

  *  There is a small mobile linear echodensity adherent to the atrial aspect of the 
posterior mitral valve leaflet. Taking the patient's clinical history into consideration, 
findings consistent with a small vegetation. There is no evidence of valvular perforation 
or abscess.

Tricuspid Valve

  *  The tricuspid valve is normal.

  *  There is no tricuspid stenosis.

  *  Doppler findings do not suggest pulmonary hypertension.

  *  Significant tricuspid regurgitation is absent.

Aortic Valve

  *  The aortic valve is trileaflet.

  *  Mild aortic valve sclerosis is present.

  *  There is a small linear echodensity noted in the long axis view on the noncoronary 
cusp of the aortic valve consistent with a nonvascular strand versus vegetation.

  *  No hemodynamically significant valvular aortic stenosis.

  *  Mild aortic regurgitation.

Pulmonic Valve

  *  The pulmonic valve is not well seen, but is grossly normal.

Great Vessels

  *  The aortic root is normal size.

  *  There is a moderate degree of nonmobile atheromatous plaque noted in the visualized 
portion of the descending thoracic aorta and aortic arch.

Pericardium

  *  There is no pericardial effusion.

## 2018-08-03 NOTE — PRE SEDATION ASSESSMENT
Pre Sedation Assessment


General


Date of Sedation:


Aug 3, 2018.





Vital Signs Past 12 Hours








  Date Time  Temp Pulse Resp B/P (MAP) Pulse Ox O2 Delivery O2 Flow Rate FiO2


 


8/3/18 04:23      Room Air  


 


8/3/18 03:04 37.5 82 18 123/73 (90) 96 Room Air  


 


8/2/18 23:35 36.8 83 18 146/100 96 Room Air  


 


8/2/18 22:16 37.2 88 18 122/72 95 Room Air  











Review


Cardiovascular:  regular rate, rhythm, no edema, no gallop, no JVD


Lungs:  lungs clear





Pre-Sedation Airway Assessment


Smoking Status:  Never Smoker


Hx of Sleep Apnea:  No


Hx of difficult intubation:  No


Short Thick Neck:  No


Oral Cavity:  WNL


Mallampati Classification:  Class II


ASA Classification:  Class II





NPO Status


Date of Last Intake of Fluids:  Aug 2, 2018


Time of Last Intake of Fluids:  23:59


Date of Last Intake of Solids:  Aug 2, 2018


Time of Last Intake of Solids:  23:59





Procedure Planning


Contraindications for Sedation:  None


Current Medications Reviewed:  Yes





Notes








The planned sedation has been discussed with the patient. Informed Consent was 

obtained.  I have identified the patient, determined the appropriateness of 

sedation and have assessed the patient immediately prior to the procedure.  





All medicine(s) and interventions are by my order.

## 2018-08-03 NOTE — CARDIOLOGY PROCEDURE BRIEF NT
Preliminary Cardiology Note


Procedure Date


Aug 3, 2018.





Pre-Procedure Diagnosis


rule out endocartitis





Post-Procedure Diagnosis


MV vegetation, endocarditis





Procedure(s) Performed





DARION under conscious sedation





Cardiologist


YONY Briggs DO





Assistant(s)


KELSEA Sarmiento, ANTHONY





Estimated Blood Loss


none





Preliminary Findings


Small mobile echodensity on the mitral valve consistent with small vegetation.


Small linear echodensity on the aortic valve.


No abscess.





Recommendations


Recommend antibiotic therapy for endocarditis.


Repeat DARION in 4-6 weeks to ensure stability.





Specimens





none





Anesthesia


Versed 3 mg IV, Fentanly 100 mcg IV





Complication(s)


None





Disposition


PCU

## 2018-08-03 NOTE — ECHOCARDIOGRAM REPORT
*NOTICE TO RECEIVING PARTY AGENCY**  This information is strictly Confidential and 
protected under [place] law.  [place] law prohibits you from making any further 
disclosure of this information unless further disclosure is expressly permitted by the 
written consent of the person to whom it pertains or is authorized by law.  A general 
authorization for the release of medical or other information is not sufficient for this 
purpose.  Hospital accepts no responsibility if the information is made available to any 
other person, INCLUDING THE PATIENT.



Interpretation Summary

  *  Name: GOVIND MACHUCA  Study Date: 2018 06:56 AM  BP: 123/73 mmHg

  *  MRN: [id number]  Patient Location: C.2T\S\S235\S\1  HR: 82

  *  : 1950 (M/d/yyyy)  Gender: Male  Height: 70 in

  *  Age: 68 yrs  Ethnicity: [place]  Weight: 189 lb

  *  Ordering Physician: Brandan Briggs

  *  Referring Physician: Self, Referred

  *  Performed By: ERIC Renteria RCS

  *  Accession# [id number]  Account# [id number]

  *  Reason For Study: Endocarditis

  *  BSA: 2.0 m2

  *  -- Conclusions --

  *  There is normal left ventricular wall thickness.

  *  The left ventricular wall motion is normal.

  *  The LV ejection Fraction = 60-65%.

  *  Mild aortic regurgitation.

  *  There is a small linear mobile echodensity, anterior to the anterior portion of the 
mitral valve annulus measuring 0.8 cm x 0.2 cm (image 98).  This could be artifactual, 
however given clinical history, agitation must be considered, and the transesophageal 
echocardiogram is recommended for further assessment.

Procedure Details

  *  A complete two-dimensional transthoracic echocardiogram was performed (2D, M-mode, 
Doppler and color flow Doppler).

Left Ventricle

  *  The left ventricle is normal in size.

  *  There is normal left ventricular wall thickness.

  *  Left ventricular systolic function is normal.

  *  Ejection Fraction = 60-65%.

  *  The left ventricular wall motion is normal.

Right Ventricle

  *  The right ventricle is normal in size and function.

Atria

  *  The left atrial size is normal.

  *  Right atrial size is normal.

  *  There is no evidence of atrial septal defect, but resolution does not allow 
assessment for a patent foramen ovale.

Mitral Valve

  *  There is a small linear mobile echodensity, anterior to the anterior portion of the 
mitral valve annulus measuring 0.8 cm x 0.2 cm (image 98).  This could be artifactual, 
however given clinical history, agitation must be considered, and the transesophageal 
echocardiogram is recommended for further assessment.

  *  There is no mitral valve stenosis.

  *  Significant mitral regurgitation is absent.

Tricuspid Valve

  *  The tricuspid valve is normal.

  *  There is no tricuspid stenosis.

  *  Significant tricuspid regurgitation is absent.

  *  Doppler findings do not suggest pulmonary hypertension.

Aortic Valve

  *  The aortic valve is trileaflet.

  *  Aortic valve sclerosis mild, without significant aortic valvular stenosis.

  *  Aortic stenosis is absent.

  *  Mild aortic regurgitation.

Pulmonic Valve

  *  The pulmonary valve is not well seen, but the Doppler examination is normal without 
significant regurgitation or stenosis.

Great Vessels

  *  The aortic root and proximal ascending aorta are normal sized.

Pericardium/Pleural

  *  There is no pericardial effusion.

Right Ventricle

  *  A moderator band is seen in the right ventricle (normal anatomical variant).

Great Vessels

  *  Normal inferior vena cava diameter and respiratory variation suggests normal central 
venous pressure.

Left Ventricular Diastolic Function

  *  Grade I diastolic dysfunction, (abnormal relaxation pattern).



MMode 2D Measurements and Calculations

IVSd 1.0 cm

IVSs 1.4 cm



LVIDd 4.7 cm

LVIDs 3.0 cm

LVPWd 1.0 cm

LVPWs 1.6 cm



IVS/LVPW 1.0 

FS 35.5 %

EDV(Teich) 102.1 ml

ESV(Teich) 35.8 ml

EF(Teich) 65.0 %



EDV(cubed) 103.5 ml

ESV(cubed) 27.7 ml

EF(cubed) 73.2 %

% IVS thick 31.9 %

% LVPW thick 52.9 %





LV mass(C)d 172.6 grams

LV mass(C)dI 84.7 grams/m\S\2

LV mass(C)s 155.6 grams

LV mass(C)sI 76.3 grams/m\S\2



SV(Teich) 66.4 ml

SI(Teich) 32.6 ml/m\S\2

SV(cubed) 75.8 ml

SI(cubed) 37.2 ml/m\S\2



ACS 3.9 cm

LA dimension 4.5 cm



asc Aorta Diam 4.3 cm





EDV(sp4-el) 105.0 ml

ESV(sp4-el) 45.0 ml

EF(sp4-el) 57.1 %



EDV(MOD-sp2) 126.0 ml

ESV(MOD-sp2) 47.0 ml

EF(MOD-sp2) 62.7 %



SV(MOD-sp2) 79.0 ml

SI(MOD-sp2) 38.8 ml/m\S\2



SV(sp4-el) 60.0 ml

SI(sp4-el) 29.4 ml/m\S\2







Doppler Measurements and Calculations

MV E max amy 61.8 cm/sec

MV A max amy 58.9 cm/sec



MV E/A 1.0 



MV P1/2t max amy 71.6 cm/sec

MV P1/2t 83.4 msec

MVA(P1/2t) 2.6 cm\S\2

MV dec slope 251.6 cm/sec\S\2

MV dec time 0.30 sec



Ao V2 max 132.7 cm/sec

Ao max PG 7.0 mmHg

Ao max PG (full) 3.0 mmHg





AI max amy 437.5 cm/sec

AI max PG 76.6 mmHg

AI dec slope 282.4 cm/sec\S\2

AI P1/2t 453.7 msec



LV V1 max PG 4.0 mmHg



LV V1 max 100.3 cm/sec



PA V2 max 138.5 cm/sec

PA max PG 7.7 mmHg





TR max amy 316.6 cm/sec

## 2018-08-03 NOTE — PHARMACY PROGRESS NOTE
Pharmacy Abx Initial Consult


Date of Service


Aug 3, 2018.





Pharmacy Dosing Scope


Date of Consult:  8/3/18


Consultation requested by: Dr. Calderon





Pharmacy is consulted to initiate Vancomycin IV dosing therapy, order 

appropriate labs and adjust drug dose/frequency.





Subjective


The patient is a 68 year old male admitted on Aug 2, 2018 at 21:24.





Objective


Height (Feet):  5


Height (Inches):  10.00


Weight (Kilograms):  83.800


Vital Signs (Past 12Hrs)





Vital Signs Past 12 Hours








  Date Time  Temp Pulse Resp B/P (MAP) Pulse Ox O2 Delivery O2 Flow Rate FiO2


 


8/3/18 13:00 36.5 90 18 127/83 (98) 97 Room Air  


 


8/3/18 12:11 37.2 87 18 140/88 (105) 87   


 


8/3/18 11:49  82 16 133/86 (102) 96 Room Air  


 


8/3/18 11:39  80 14 131/86 (101) 93 Room Air  


 


8/3/18 11:29  83 16 137/87 (104) 94 Room Air  


 


8/3/18 11:19  83 12 133/87 (102) 97 Nasal Cannula 2 


 


8/3/18 11:17      Nasal Cannula 2 


 


8/3/18 11:15  77 20 150/93 97 Nasal Cannula 2 


 


8/3/18 11:10  74 16 141/91 96 Nasal Cannula 2 


 


8/3/18 11:05  80 16 133/85 97 Nasal Cannula 2 


 


8/3/18 11:00  75 20 140/86 97 Nasal Cannula 2 


 


8/3/18 10:55  80 12 136/89 96 Nasal Cannula 2 


 


8/3/18 10:53      Nasal Cannula 2 


 


8/3/18 10:50  81 18 128/92 97 Nasal Cannula 2 


 


8/3/18 10:45  80 16 123/90 99 Nasal Cannula 2 


 


8/3/18 10:43  83 16 137/89 98 Nasal Cannula 2 


 


8/3/18 09:59 37.2 81 12 131/86 (101) 97 Room Air  


 


8/3/18 08:00      Room Air  


 


8/3/18 04:23      Room Air  


 


8/3/18 03:04 37.5 82 18 123/73 (90) 96 Room Air  








Lab Results (24Hrs)





Laboratory Tests (24 Hours)








Test


  8/2/18


18:00 8/3/18


07:41


 


C-Reactive Protein


  12.00 mg/dl


(0-0.29)  H 


 


 


Erythrocyte Sedimentation Rate


  21 mm/hr


(0-14)  H 


 


 


Procalcitonin


  0.12 ng/ml


(0-0.5) 


 


 


Total Creatine Kinase


  73 U/L


() 


 


 


White Blood Count


  


  13.74 K/uL


(4.8-10.8)  H


 


Red Blood Count


  


  4.74 M/uL


(4.7-6.1)


 


Hemoglobin


  


  15.0 g/dL


(14.0-18.0)


 


Hematocrit


  


  43.1 % (42-52)


 


 


Mean Corpuscular Volume


  


  90.9 fL


()


 


Mean Corpuscular Hemoglobin


  


  31.6 pg


(25-34)


 


Mean Corpuscular Hemoglobin


Concent 


  34.8 g/dl


(32-36)


 


Platelet Count


  


  173 K/uL


(130-400)


 


Mean Platelet Volume


  


  10.4 fL


(7.4-10.4)


 


Neutrophils (%) (Auto)  83.1 %  


 


Lymphocytes (%) (Auto)  8.2 %  


 


Monocytes (%) (Auto)  7.8 %  


 


Eosinophils (%) (Auto)  0.1 %  


 


Basophils (%) (Auto)  0.2 %  


 


Neutrophils # (Auto)


  


  11.41 K/uL


(1.4-6.5)  H


 


Lymphocytes # (Auto)


  


  1.13 K/uL


(1.2-3.4)  L


 


Monocytes # (Auto)


  


  1.07 K/uL


(0.11-0.59)  H


 


Eosinophils # (Auto)


  


  0.02 K/uL


(0-0.5)


 


Basophils # (Auto)


  


  0.03 K/uL


(0-0.2)








Micro Results











 Date/Time


Source Procedure


Growth Status


 


 


 8/2/18 18:08


Blood Blood Culture


Pending Received


 


 8/2/18 18:00


Blood Blood Culture


Pending Received


 


 8/2/18 17:50


Blood Blood Culture


Pending Received


 


 8/2/18 17:40


Blood Blood Culture


Pending Received


 


 8/3/18 00:02


Stool C.difficile Toxin B Gene (PCR) - Final


No C. difficile toxin B gene detected Complete











Risk Factors for Resistance








* History of infection with a multidrug-resistant organism:  MRSA in elbow 3/18





Assessment & Plan


Assessment








68 year old male presenting to ED with fever and rash. Had a fever of 102.2 

yesterday and generalized weakness. 2 episodes of diarrhea today. Diagnosed 

with shingles 2 weeks ago and finished treatment yesterday. Patient has history 

of right septic elbow bursitis (MRSA) in 03/18 , had PICC line placed and was 

treated with bactrim and daptomycin. Blood cultures currently pending. ID is 

consulted. 








Plan








Vancomycin for treatment of Endocarditis





Vancomycin IV 


* Loading dose: 2000 mg  (24 mg/kg)


* Maintenance dose: 1500 mg IV (18 mg/kg) every 12 hours


* Estimated pharmacokinetics: ke=0.063, t1/2=11 hours


* Goal trough level for Endocarditis : 15 to 20 mcg/mL


* Trough/Random level ordered for 8/4/18 at 1130











Pharmacy will continue to follow and will adjust dose/frequency as necessary.  

Thank you.

## 2018-08-04 VITALS
DIASTOLIC BLOOD PRESSURE: 85 MMHG | TEMPERATURE: 98.24 F | OXYGEN SATURATION: 95 % | HEART RATE: 83 BPM | SYSTOLIC BLOOD PRESSURE: 130 MMHG

## 2018-08-04 VITALS
SYSTOLIC BLOOD PRESSURE: 120 MMHG | OXYGEN SATURATION: 97 % | DIASTOLIC BLOOD PRESSURE: 74 MMHG | HEART RATE: 70 BPM | TEMPERATURE: 98.6 F

## 2018-08-04 VITALS
SYSTOLIC BLOOD PRESSURE: 112 MMHG | TEMPERATURE: 99.5 F | DIASTOLIC BLOOD PRESSURE: 67 MMHG | HEART RATE: 82 BPM | OXYGEN SATURATION: 95 %

## 2018-08-04 VITALS
SYSTOLIC BLOOD PRESSURE: 152 MMHG | HEART RATE: 72 BPM | TEMPERATURE: 98.06 F | DIASTOLIC BLOOD PRESSURE: 95 MMHG | OXYGEN SATURATION: 97 %

## 2018-08-04 VITALS
TEMPERATURE: 99.5 F | OXYGEN SATURATION: 96 % | DIASTOLIC BLOOD PRESSURE: 87 MMHG | HEART RATE: 86 BPM | SYSTOLIC BLOOD PRESSURE: 134 MMHG

## 2018-08-04 VITALS
TEMPERATURE: 98.78 F | OXYGEN SATURATION: 96 % | DIASTOLIC BLOOD PRESSURE: 80 MMHG | SYSTOLIC BLOOD PRESSURE: 124 MMHG | HEART RATE: 73 BPM

## 2018-08-04 LAB
BUN SERPL-MCNC: 13 MG/DL (ref 7–18)
CALCIUM SERPL-MCNC: 8.2 MG/DL (ref 8.5–10.1)
CO2 SERPL-SCNC: 26 MMOL/L (ref 21–32)
CREAT SERPL-MCNC: 0.8 MG/DL (ref 0.6–1.4)
EOSINOPHIL NFR BLD AUTO: 168 K/UL (ref 130–400)
GLUCOSE SERPL-MCNC: 89 MG/DL (ref 70–99)
HCT VFR BLD CALC: 41.8 % (ref 42–52)
HGB BLD-MCNC: 14.4 G/DL (ref 14–18)
MCH RBC QN AUTO: 31.4 PG (ref 25–34)
MCHC RBC AUTO-ENTMCNC: 34.4 G/DL (ref 32–36)
MCV RBC AUTO: 91.3 FL (ref 80–100)
PMV BLD AUTO: 10.1 FL (ref 7.4–10.4)
POTASSIUM SERPL-SCNC: 3.7 MMOL/L (ref 3.5–5.1)
RED CELL DISTRIBUTION WIDTH CV: 13.9 % (ref 11.5–14.5)
RED CELL DISTRIBUTION WIDTH SD: 45.3 FL (ref 36.4–46.3)
SODIUM SERPL-SCNC: 139 MMOL/L (ref 136–145)
WBC # BLD AUTO: 11 K/UL (ref 4.8–10.8)

## 2018-08-04 RX ADMIN — VANCOMYCIN HYDROCHLORIDE SCH MLS/HR: 1 INJECTION, POWDER, LYOPHILIZED, FOR SOLUTION INTRAVENOUS at 22:36

## 2018-08-04 RX ADMIN — Medication SCH TAB: at 08:42

## 2018-08-04 RX ADMIN — ATORVASTATIN CALCIUM SCH MG: 10 TABLET, FILM COATED ORAL at 08:42

## 2018-08-04 RX ADMIN — SODIUM CHLORIDE AND POTASSIUM CHLORIDE SCH MLS/HR: 9; 1.49 INJECTION, SOLUTION INTRAVENOUS at 11:59

## 2018-08-04 RX ADMIN — Medication SCH MG: at 08:42

## 2018-08-04 RX ADMIN — LOPERAMIDE HYDROCHLORIDE PRN MG: 2 CAPSULE ORAL at 06:51

## 2018-08-04 RX ADMIN — ENOXAPARIN SODIUM SCH MG: 40 INJECTION SUBCUTANEOUS at 22:36

## 2018-08-04 RX ADMIN — LEVOTHYROXINE SODIUM SCH MCG: 125 TABLET ORAL at 06:43

## 2018-08-04 RX ADMIN — CEFTRIAXONE SCH MLS/HR: 2 INJECTION, POWDER, FOR SOLUTION INTRAMUSCULAR; INTRAVENOUS at 15:47

## 2018-08-04 RX ADMIN — LORAZEPAM PRN MG: 2 INJECTION INTRAMUSCULAR; INTRAVENOUS at 22:36

## 2018-08-04 RX ADMIN — VANCOMYCIN HYDROCHLORIDE SCH MLS/HR: 1 INJECTION, POWDER, LYOPHILIZED, FOR SOLUTION INTRAVENOUS at 11:58

## 2018-08-04 NOTE — PROGRESS NOTE
Medicine Progress Note


Date & Time of Visit:


Aug 4, 2018 at 18:23.


Subjective


Pt was seen and examined


Lying in bed bed with no distress


No fever in the last 24 hr


Denies any chest pain, palpitation, dizziness and SOB





Objective





Last 8 Hrs








  Date Time  Temp Pulse Resp B/P (MAP) Pulse Ox O2 Delivery O2 Flow Rate FiO2


 


8/4/18 15:57 36.7 72 16 152/95 (114) 97 Room Air  


 


8/4/18 13:52 37.1 73 20 124/80 (95) 96 Room Air  








Physical Exam:


General- No acute distress 


Head-  atraumatic


Eyes- PERRL, EOMI


ENT- oropharynx clear


Neck- supple, no JVD


Lungs- clear to auscultation 


Heart- regular rhythm; no murmur


Abdomen- normal bowel sounds, soft


Extremities-no calf tenderness


Neuro- alert, oriented x 3; PERRL, EOMI; no facial palsy


Skin- warm & dry


Laboratory Results:





Last 24 Hours








Test


  8/4/18


06:53 8/4/18


11:24


 


White Blood Count 11.00 K/uL  


 


Red Blood Count 4.58 M/uL  


 


Hemoglobin 14.4 g/dL  


 


Hematocrit 41.8 %  


 


Mean Corpuscular Volume 91.3 fL  


 


Mean Corpuscular Hemoglobin 31.4 pg  


 


Mean Corpuscular Hemoglobin


Concent 34.4 g/dl 


  


 


 


RDW Standard Deviation 45.3 fL  


 


RDW Coefficient of Variation 13.9 %  


 


Platelet Count 168 K/uL  


 


Mean Platelet Volume 10.1 fL  


 


Sodium Level 139 mmol/L  


 


Potassium Level 3.7 mmol/L  


 


Chloride Level 107 mmol/L  


 


Carbon Dioxide Level 26 mmol/L  


 


Anion Gap 6.0 mmol/L  


 


Blood Urea Nitrogen 13 mg/dl  


 


Creatinine 0.80 mg/dl  


 


Est Creatinine Clear Calc


Drug Dose 88.7 ml/min 


  


 


 


Estimated GFR (


American) 104.9 


  


 


 


Estimated GFR (Non-


American 90.5 


  


 


 


BUN/Creatinine Ratio 15.9  


 


Random Glucose 89 mg/dl  


 


Calcium Level 8.2 mg/dl  


 


Vancomycin Level Trough  9.3 mcg/ml 











Assessment & Plan


ENDOCARDITIS


Present with fever/chills and osler's nodes/Janeway lesions on the hand


Recently completed treatment for shingles


Hx of right septic olecranon bursitis in 3/18 with MRSA


ECHO showed small linear echodensity noted in the long axis view on the 

noncoronary cusp of aortic valve


DARION performed today by Dr. Briggs showed small mobile echodensity on the 

mitral valve consistent with small vegetation. and small linear echodensity on 

the aortic valve.


ID on board recommended to continue Vanco and adding IV Rocephin


Procalcitonin normal


Blood cx no growth 


Will need a long course IV abx treatment for 6 weeks


Will get a PICC line once blood cx showed no growth 


Will need a repeat DARION in 4-6 weeks to ensure stability.








HYPOTHYROIDISM


On levothyroxine





HLD


On atorvastatin





DVT Prophylaxis


On Lovenox sub q





Code Status FULL CODE


Current Inpatient Medications:





Current Inpatient Medications








 Medications


  (Trade)  Dose


 Ordered  Sig/Mera


 Route  Start Time


 Stop Time Status Last Admin


Dose Admin


 


 Enoxaparin Sodium


  (Lovenox Inj)  40 mg  Q24H


 SC  8/2/18 21:00


 9/1/18 20:59  8/3/18 20:32


40 MG


 


 Acetaminophen


  (Tylenol Tab)  650 mg  Q4H  PRN


 PO  8/2/18 22:15


 9/1/18 22:14  8/3/18 16:27


650 MG


 


 Nitroglycerin


  (Nitrostat Tab)  0.4 mg  UD  PRN


 SL  8/2/18 22:15


 9/1/18 22:14   


 


 


 Aspirin


  (Ecotrin Tab)  81 mg  DAILY


 PO  8/3/18 09:00


 9/2/18 08:59  8/4/18 08:42


81 MG


 


 Atorvastatin


 Calcium


  (Lipitor Tab)  10 mg  QAM


 PO  8/3/18 09:00


 9/2/18 08:59  8/4/18 08:42


10 MG


 


 Levothyroxine


 Sodium


  (Synthroid Tab)  125 mcg  DAILYBB


 PO  8/3/18 06:00


 9/2/18 06:59  8/4/18 06:43


125 MCG


 


 Multivitamins/


 Minerals


  (Multivitamin W/


 Minerals Tab)  1 tab  QAM


 PO  8/3/18 09:00


 9/2/18 08:59  8/4/18 08:42


1 TAB


 


 Tramadol HCl


  (Ultram Tab)  25 mg  Q6H  PRN


 PO  8/2/18 22:15


 9/1/18 22:14  8/3/18 20:27


25 MG


 


 Prochlorperazine


 Edisylate 5 mg/


 Syringe  5 ml @ 5


 mls/min  Q6H  PRN


 IV  8/2/18 22:15


 9/1/18 22:14   


 


 


 Lorazepam


  (Ativan Inj)  0.5 mg  Q4H  PRN


 IV  8/2/18 22:15


 9/1/18 22:14  8/3/18 22:00


0.5 MG


 


 Morphine Sulfate


  (MoRPHine


 SULFATE INJ)  4 mg  Q3H  PRN


 IV  8/2/18 22:15


 8/16/18 22:14   


 


 


 Vancomycin HCl


  (Consult)  1 ea  UD  PRN


 N/A  8/4/18 09:00


 9/3/18 08:59   


 


 


 Ceftriaxone


 Sodium 2000 mg/


 Dextrose  70 ml @ 


 100 mls/hr  Q24H


 IV  8/3/18 16:30


 9/14/18 16:29  8/4/18 15:47


100 MLS/HR


 


 Loperamide HCl


  (Imodium Cap)  2 mg  UD  PRN


 PO  8/3/18 21:30


 9/2/18 21:29  8/4/18 06:51


2 MG


 


 Vancomycin HCl


 1500 mg/Sodium


 Chloride  530 ml @ 


 200 mls/hr  Q10H


 IV  8/4/18 22:00


 9/15/18 21:59

## 2018-08-04 NOTE — CARDIOLOGY FOLLOW-UP
Subjective


General


Date of Service:


Aug 4, 2018.


Pt evaluation today including:  conversation w/ patient, physical exam, chart 

review, lab review, review of studies, review of inpatient medication list





History of Present Illness


The patient is a 68 year old male seen in follow up.


Reports subjective chills yesterday with tmax of 38.5 C at 16:45.


Denies CP or SOB.


Reports dental cleaning approximately 5 days prior to symptom onset.


Offers no other complaints.





Allergies


Coded Allergies:  


     No Known Allergies (Unverified , 8/2/18)





Social History


Smoking Status:  Never Smoker


Hx Tobacco Use In Past Year?:  No


Hx Alcohol Use - Type And Amou:  Yes (Wine and Martini 2 per day)


Hx Substance Use - Type And Am:  No





Problem List


Medical Problems:


(1) Fever


Status: Acute  





(2) Kidney stone


Status: Acute  





(3) Rash


Status: Acute  





(4) Renal colic


Status: Acute  





(5) Right flank pain


Status: Acute  











Review of Systems


Respiratory:  No cough, No wheezing, No shortness of breath, No dyspnea at rest

, No hemoptysis


Cardiac:  No chest pain, No PND, No edema, No claudication, No palpitations





Physical Exam


Vital Signs





Last Vital Signs Documentation








  Date Time  Temp Pulse Resp B/P (MAP) Pulse Ox O2 Delivery O2 Flow Rate FiO2


 


8/4/18 13:52 37.1 73 20 124/80 (95) 96 Room Air  


 


8/3/18 11:19       2 











Physical Exam


Constitutional:  


   General Apperance:  heathly-appearing


   Level of Distress:  NAD


Neck:  supple, trachea midline


Lungs:  


   Respiratory effort:  no dyspnea


   Auscultation:  breath sounds normal, no wheezing, no rales/crackles, no 

rhonchi


Cardiovascular:  


   Heart Auscultation:  RRR, normal S1, normal S2, no murmurs


Peripheral Pulses:  


   Radial Pulse:  normal on the left, normal on the right


Abdomen:  


   Bowel Sounds:  normal


   Inspection & Palpation:  soft, non-distended, no tenderness, guarding & 

rebound


Extremities:  no edema, pertinent finding ( purpuric blistering rash limited to 

left hand)


Neurologic:  


   Gait & Station:  pertinent finding (No focal motor deficit.)


   Cranial Nerves:  grossly intact





Assessment and Plan


Assessment and Plan


Final Impression:





1. Mitral valve endocarditis


    - Blood cultures negative x 48 hours


    - recurrent fever 8/3/2018





2. History of right-sided olecranon MRSA bursitis





Plan/recommendations:


Continue intravenous antibiotic therapy 6 weeks.  Antibiotic spectrum will be 

narrowed pending review of blood culture results and infectious disease 

recommendations.  Continue to monitor for signs of recurrent embolic phenomenon.





Laboratory Results





Last 24 Hours








Test


  8/4/18


06:53 8/4/18


11:24


 


White Blood Count 11.00 K/uL  


 


Red Blood Count 4.58 M/uL  


 


Hemoglobin 14.4 g/dL  


 


Hematocrit 41.8 %  


 


Mean Corpuscular Volume 91.3 fL  


 


Mean Corpuscular Hemoglobin 31.4 pg  


 


Mean Corpuscular Hemoglobin


Concent 34.4 g/dl 


  


 


 


RDW Standard Deviation 45.3 fL  


 


RDW Coefficient of Variation 13.9 %  


 


Platelet Count 168 K/uL  


 


Mean Platelet Volume 10.1 fL  


 


Sodium Level 139 mmol/L  


 


Potassium Level 3.7 mmol/L  


 


Chloride Level 107 mmol/L  


 


Carbon Dioxide Level 26 mmol/L  


 


Anion Gap 6.0 mmol/L  


 


Blood Urea Nitrogen 13 mg/dl  


 


Creatinine 0.80 mg/dl  


 


Est Creatinine Clear Calc


Drug Dose 88.7 ml/min 


  


 


 


Estimated GFR (


American) 104.9 


  


 


 


Estimated GFR (Non-


American 90.5 


  


 


 


BUN/Creatinine Ratio 15.9  


 


Random Glucose 89 mg/dl  


 


Calcium Level 8.2 mg/dl  


 


Vancomycin Level Trough  9.3 mcg/ml

## 2018-08-04 NOTE — PHARMACY PROGRESS NOTE
Pharmacy Abx Dose Short Note


Date of Service


Aug 4, 2018.





Assessment & Plan


Assessment








* 68 year old male receiving VANCOMYCIN IV (dosing per pharmacy) + CEFTRIAXONE 

IV for treatment of endocarditis


* Pt has h/o R elbow cellulitis / septic bursitis.


* Prior cx's of R elbow reviewed


 * 3/29/18 abscess grew MRSA: sensitive daptomycin and vancomycin (vanco JESS = 2

)


 * 5/30/18 synovial fluid grew CoNS oxacillin resistant: sensitive to daptomycin

, and reported sensitive to vancomycin however JESS of 4 would not be achievable 

w/ conventional vancomycin dosing strategies.


* Day # 3 of antimicrobial therapy


* Echo interpreted as mobile echodensity on mitral valve that could be 

consistent w/ vegetation, also an echodensity was seen on AVR that could also 

represent vegetation vs nonvascular strand


* Pt already w/ evidence of multiple emboli to L arm and hand








Plan








Vancomycin


* Trough level of 9.3 mcg/mL is subtherapeutic.  Level was drawn prior to 

achieving steady-state however as only 2 maintenance doses have been given thus 

far.  I anticipate the level to rise w/ repeat dosing however likely will not 

achieve our ultimate goal without an increase in dose.


* Change to 1500 mg IV (~18mg/kg) every 10 hours


* Goal trough level for endocarditis : 15 to 20 mcg/mL - closer to 20 in this 

case given the JESS's of organisms cultured in the past


* Trough level ordered for: 8/5/18 w/ 3rd dose of new regimen


* Might wish to consider use of daptomycin instead given already aggressive 

vancomycin dosing regimen for a 67yo and h/o cx's with vancomycin JESS's of 2 or 

greater





Pharmacy will continue to follow and will adjust dose/frequency as necessary.  

Thank you.

## 2018-08-05 VITALS
SYSTOLIC BLOOD PRESSURE: 121 MMHG | TEMPERATURE: 98.6 F | OXYGEN SATURATION: 96 % | DIASTOLIC BLOOD PRESSURE: 81 MMHG | HEART RATE: 72 BPM

## 2018-08-05 VITALS
SYSTOLIC BLOOD PRESSURE: 138 MMHG | DIASTOLIC BLOOD PRESSURE: 84 MMHG | HEART RATE: 69 BPM | TEMPERATURE: 98.42 F | OXYGEN SATURATION: 98 %

## 2018-08-05 VITALS
OXYGEN SATURATION: 98 % | DIASTOLIC BLOOD PRESSURE: 89 MMHG | TEMPERATURE: 97.34 F | SYSTOLIC BLOOD PRESSURE: 132 MMHG | HEART RATE: 71 BPM

## 2018-08-05 VITALS
SYSTOLIC BLOOD PRESSURE: 130 MMHG | DIASTOLIC BLOOD PRESSURE: 88 MMHG | TEMPERATURE: 98.96 F | HEART RATE: 77 BPM | OXYGEN SATURATION: 94 %

## 2018-08-05 VITALS
SYSTOLIC BLOOD PRESSURE: 135 MMHG | TEMPERATURE: 98.06 F | DIASTOLIC BLOOD PRESSURE: 87 MMHG | HEART RATE: 72 BPM | OXYGEN SATURATION: 97 %

## 2018-08-05 VITALS
DIASTOLIC BLOOD PRESSURE: 72 MMHG | OXYGEN SATURATION: 97 % | TEMPERATURE: 99.14 F | HEART RATE: 77 BPM | SYSTOLIC BLOOD PRESSURE: 111 MMHG

## 2018-08-05 VITALS
HEART RATE: 70 BPM | SYSTOLIC BLOOD PRESSURE: 125 MMHG | OXYGEN SATURATION: 96 % | TEMPERATURE: 98.78 F | DIASTOLIC BLOOD PRESSURE: 76 MMHG

## 2018-08-05 LAB — CREAT SERPL-MCNC: 0.74 MG/DL (ref 0.6–1.4)

## 2018-08-05 RX ADMIN — VANCOMYCIN HYDROCHLORIDE SCH MLS/HR: 1 INJECTION, POWDER, LYOPHILIZED, FOR SOLUTION INTRAVENOUS at 07:54

## 2018-08-05 RX ADMIN — Medication SCH MG: at 07:55

## 2018-08-05 RX ADMIN — CEFTRIAXONE SCH MLS/HR: 2 INJECTION, POWDER, FOR SOLUTION INTRAMUSCULAR; INTRAVENOUS at 15:14

## 2018-08-05 RX ADMIN — LEVOTHYROXINE SODIUM SCH MCG: 125 TABLET ORAL at 06:03

## 2018-08-05 RX ADMIN — ATORVASTATIN CALCIUM SCH MG: 10 TABLET, FILM COATED ORAL at 07:55

## 2018-08-05 RX ADMIN — Medication SCH TAB: at 07:54

## 2018-08-05 RX ADMIN — VANCOMYCIN HYDROCHLORIDE SCH MLS/HR: 1 INJECTION, POWDER, LYOPHILIZED, FOR SOLUTION INTRAVENOUS at 17:53

## 2018-08-05 RX ADMIN — LORAZEPAM PRN MG: 2 INJECTION INTRAMUSCULAR; INTRAVENOUS at 22:02

## 2018-08-05 RX ADMIN — ENOXAPARIN SODIUM SCH MG: 40 INJECTION SUBCUTANEOUS at 22:03

## 2018-08-05 NOTE — PROGRESS NOTE
Medicine Progress Note


Date & Time of Visit:


Aug 5, 2018 at 18:36.


Subjective


Pt was seen and examined


Lying in bed with no distress


Pt said that he feels fine


He said that he was hoping to be discharged today


But he was a little disappointed when his blood cx was positive 


Denies any fever, chest pain, palpitation, dizziness and SOB





Objective





Last 8 Hrs








  Date Time  Temp Pulse Resp B/P (MAP) Pulse Ox O2 Delivery O2 Flow Rate FiO2


 


8/5/18 14:55 36.3 71 20 132/89 (103) 98 Room Air  


 


8/5/18 11:28 36.9 69 20 138/84 (102) 98 Room Air  








Physical Exam:


General- No acute distress 


Head-  atraumatic


Eyes- PERRL, EOMI


ENT- oropharynx clear


Neck- supple, no JVD


Lungs- clear to auscultation 


Heart- regular rhythm; no murmur


Abdomen- normal bowel sounds, soft


Extremities-no calf tenderness


Neuro- alert, oriented x 3; PERRL, EOMI; no facial palsy


Skin- warm & dry


Laboratory Results:





Last 24 Hours








Test


  8/5/18


05:59 8/5/18


17:21


 


Creatinine 0.74 mg/dl  


 


Est Creatinine Clear Calc


Drug Dose 95.9 ml/min 


  


 


 


Estimated GFR (


American) 108.3 


  


 


 


Estimated GFR (Non-


American 93.5 


  


 


 


Vancomycin Level Trough  15.2 mcg/ml 














 Date/Time


Source Procedure


Growth Status


 


 


 8/5/18 13:38


Blood Blood Culture


Pending Received


 


 8/5/18 13:12


Blood Blood Culture


Pending Received











Assessment & Plan


ENDOCARDITIS


Present with fever/chills and osler's nodes/Janeway lesions on the hand


Recently completed treatment for shingles


Hx of right septic olecranon bursitis in 3/18 with MRSA


ECHO showed small linear echodensity noted in the long axis view on the 

noncoronary cusp of aortic valve


DARION performed today by Dr. Briggs showed small mobile echodensity on the 

mitral valve consistent with small vegetation. and small linear echodensity on 

the aortic valve.


ID on board recommended to continue Vanco and adding IV Rocephin


Procalcitonin normal


Blood cx no growth 


Will need a long course IV abx treatment for 6 weeks


Will get a PICC line once blood cx showed no growth 


Will need a repeat DARION in 4-6 weeks to ensure stability.


8/5


Blood cx positive for gram negative bacilli (1 out 8 bottles)


Might be related to one of the organism fom the [facility]ACE[facility] group


Pt said that he had dental work done 5 days before onset symptoms


Case discussed with ID recommended to continue Rocephin and Vanco IV


Repeat blood cx


Will need a long course IV abx treatment for 6 weeks


Will get a PICC line once blood cx showed no growth 


Will need a repeat DARION in 4-6 weeks to ensure stability.








HYPOTHYROIDISM


On levothyroxine





HLD


On atorvastatin





DVT Prophylaxis


On Lovenox sub q





Code Status FULL CODE


Current Inpatient Medications:





Current Inpatient Medications








 Medications


  (Trade)  Dose


 Ordered  Sig/Mera


 Route  Start Time


 Stop Time Status Last Admin


Dose Admin


 


 Enoxaparin Sodium


  (Lovenox Inj)  40 mg  Q24H


 SC  8/2/18 21:00


 9/1/18 20:59  8/4/18 22:36


40 MG


 


 Acetaminophen


  (Tylenol Tab)  650 mg  Q4H  PRN


 PO  8/2/18 22:15


 9/1/18 22:14  8/3/18 16:27


650 MG


 


 Nitroglycerin


  (Nitrostat Tab)  0.4 mg  UD  PRN


 SL  8/2/18 22:15


 9/1/18 22:14   


 


 


 Aspirin


  (Ecotrin Tab)  81 mg  DAILY


 PO  8/3/18 09:00


 9/2/18 08:59  8/5/18 07:55


81 MG


 


 Atorvastatin


 Calcium


  (Lipitor Tab)  10 mg  QAM


 PO  8/3/18 09:00


 9/2/18 08:59  8/5/18 07:55


10 MG


 


 Levothyroxine


 Sodium


  (Synthroid Tab)  125 mcg  DAILYBB


 PO  8/3/18 06:00


 9/2/18 06:59  8/5/18 06:03


125 MCG


 


 Multivitamins/


 Minerals


  (Multivitamin W/


 Minerals Tab)  1 tab  QAM


 PO  8/3/18 09:00


 9/2/18 08:59  8/5/18 07:54


1 TAB


 


 Tramadol HCl


  (Ultram Tab)  25 mg  Q6H  PRN


 PO  8/2/18 22:15


 9/1/18 22:14  8/3/18 20:27


25 MG


 


 Prochlorperazine


 Edisylate 5 mg/


 Syringe  5 ml @ 5


 mls/min  Q6H  PRN


 IV  8/2/18 22:15


 9/1/18 22:14   


 


 


 Lorazepam


  (Ativan Inj)  0.5 mg  Q4H  PRN


 IV  8/2/18 22:15


 9/1/18 22:14  8/4/18 22:36


0.5 MG


 


 Morphine Sulfate


  (MoRPHine


 SULFATE INJ)  4 mg  Q3H  PRN


 IV  8/2/18 22:15


 8/16/18 22:14   


 


 


 Vancomycin HCl


  (Consult)  1 ea  UD  PRN


 N/A  8/4/18 09:00


 9/3/18 08:59   


 


 


 Ceftriaxone


 Sodium 2000 mg/


 Dextrose  70 ml @ 


 100 mls/hr  Q24H


 IV  8/3/18 16:30


 9/14/18 16:29  8/5/18 15:14


100 MLS/HR


 


 Loperamide HCl


  (Imodium Cap)  2 mg  UD  PRN


 PO  8/3/18 21:30


 9/2/18 21:29  8/4/18 06:51


2 MG


 


 Vancomycin HCl


 1500 mg/Sodium


 Chloride  530 ml @ 


 200 mls/hr  Q10H


 IV  8/4/18 22:00


 9/15/18 21:59  8/5/18 17:53


200 MLS/HR

## 2018-08-05 NOTE — CARDIOLOGY FOLLOW-UP
Subjective


General


Date of Service:


Aug 5, 2018.


Pt evaluation today including:  conversation w/ patient, physical exam, chart 

review, lab review, review of studies, review of inpatient medication list





History of Present Illness


The patient is a 68 year old male seen in follow-up.  1 out of 4 blood cultures 

is positive for gram-negative bacilli.  No recurrent fevers overnight.  No new 

skin lesions of his left upper extremity.  Denies chest pain or shortness of 

breath.  Telemetry demonstrates sinus rhythm.  Tolerating current medications.





Allergies


Coded Allergies:  


     No Known Allergies (Unverified , 8/2/18)





Social History


Smoking Status:  Never Smoker


Hx Tobacco Use In Past Year?:  No


Hx Alcohol Use - Type And Amou:  Yes (Wine and Martini 2 per day)


Hx Substance Use - Type And Am:  No





Problem List


Medical Problems:


(1) Fever


Status: Acute  





(2) Kidney stone


Status: Acute  





(3) Rash


Status: Acute  





(4) Renal colic


Status: Acute  





(5) Right flank pain


Status: Acute  











Review of Systems


Respiratory:  No cough, No sputum, No wheezing, No shortness of breath, No 

dyspnea at rest, No hemoptysis


Cardiac:  No chest pain, No orthopnea, No PND, No edema, No claudication, No 

palpitations





Physical Exam


Vital Signs





Last Vital Signs Documentation








  Date Time  Temp Pulse Resp B/P (MAP) Pulse Ox O2 Delivery O2 Flow Rate FiO2


 


8/5/18 08:00      Room Air  


 


8/5/18 07:03 36.7 72 20 135/87 (103) 97   


 


8/3/18 11:19       2 











Physical Exam


Constitutional:  


   General Apperance:  heathly-appearing


   Level of Distress:  NAD


Head:  normocephalic, atraumatic


Neck:  supple, trachea midline


Lungs:  


   Respiratory effort:  no dyspnea


   Auscultation:  breath sounds normal, no wheezing, no rales/crackles, no 

rhonchi


Cardiovascular:  


   Heart Auscultation:  RRR, normal S1, normal S2, no murmurs


Peripheral Pulses:  


   Radial Pulse:  normal on the left, normal on the right


Abdomen:  


   Bowel Sounds:  normal


   Inspection & Palpation:  soft, non-distended, no tenderness, guarding & 

rebound


Extremities:  no edema, pertinent finding ( purpuric blistering rash limited to 

left hand)


Neurologic:  


   Gait & Station:  pertinent finding (No focal motor deficit.)


   Cranial Nerves:  grossly intact





Assessment and Plan


Assessment and Plan


Final Impression:





1. Mitral valve endocarditis


    - 1/4 blood cultures positive for gram-negative bacilli


    - recurrent fever 8/3/2018 without recurrence





2. History of right-sided olecranon MRSA bursitis





Plan/recommendations:


Continue intravenous antibiotic therapy 6 weeks.  Antibiotic spectrum will be 

narrowed pending review of blood culture results and infectious disease 

recommendations.  Continue to monitor for signs of recurrent embolic phenomenon.





Laboratory Results





Last 24 Hours








Test


  8/4/18


11:24 8/5/18


05:59


 


Vancomycin Level Trough 9.3 mcg/ml  


 


Creatinine  0.74 mg/dl 


 


Est Creatinine Clear Calc


Drug Dose 


  95.9 ml/min 


 


 


Estimated GFR (


American) 


  108.3 


 


 


Estimated GFR (Non-


American 


  93.5

## 2018-08-05 NOTE — PHARMACY PROGRESS NOTE
Pharmacy Abx Dose Short Note


Date of Service


Aug 5, 2018.





Assessment & Plan


Assessment








68 year old male receiving Vancomycin for treatment of Endocarditis


Day # 4 of antimicrobial therapy.











Plan








Vancomycin


* Trough level of 15.2 mcg/mL is therapeutic.


* Continue dose of Vancomycin 1500 mg IV every 10 hours.


* Goal trough level for Endocarditis: 15 to 20 mcg/mL





Pharmacy will continue to follow and will adjust dose/frequency as necessary.  

Thank you.

## 2018-08-06 VITALS
TEMPERATURE: 98.06 F | DIASTOLIC BLOOD PRESSURE: 94 MMHG | SYSTOLIC BLOOD PRESSURE: 135 MMHG | HEART RATE: 70 BPM | OXYGEN SATURATION: 97 %

## 2018-08-06 VITALS
DIASTOLIC BLOOD PRESSURE: 90 MMHG | TEMPERATURE: 97.7 F | HEART RATE: 65 BPM | OXYGEN SATURATION: 98 % | SYSTOLIC BLOOD PRESSURE: 143 MMHG

## 2018-08-06 VITALS
DIASTOLIC BLOOD PRESSURE: 92 MMHG | SYSTOLIC BLOOD PRESSURE: 136 MMHG | TEMPERATURE: 97.34 F | HEART RATE: 78 BPM | OXYGEN SATURATION: 98 %

## 2018-08-06 VITALS
DIASTOLIC BLOOD PRESSURE: 87 MMHG | OXYGEN SATURATION: 96 % | TEMPERATURE: 97.88 F | HEART RATE: 71 BPM | SYSTOLIC BLOOD PRESSURE: 132 MMHG

## 2018-08-06 VITALS
HEART RATE: 64 BPM | OXYGEN SATURATION: 96 % | SYSTOLIC BLOOD PRESSURE: 142 MMHG | TEMPERATURE: 98.6 F | DIASTOLIC BLOOD PRESSURE: 93 MMHG

## 2018-08-06 VITALS
TEMPERATURE: 98.06 F | SYSTOLIC BLOOD PRESSURE: 147 MMHG | HEART RATE: 69 BPM | DIASTOLIC BLOOD PRESSURE: 97 MMHG | OXYGEN SATURATION: 94 %

## 2018-08-06 VITALS — OXYGEN SATURATION: 96 %

## 2018-08-06 LAB
BASOPHILS # BLD: 0.05 K/UL (ref 0–0.2)
BASOPHILS NFR BLD: 0.6 %
BUN SERPL-MCNC: 11 MG/DL (ref 7–18)
CALCIUM SERPL-MCNC: 8.4 MG/DL (ref 8.5–10.1)
CO2 SERPL-SCNC: 28 MMOL/L (ref 21–32)
CREAT SERPL-MCNC: 0.88 MG/DL (ref 0.6–1.4)
EOS ABS #: 0.23 K/UL (ref 0–0.5)
EOSINOPHIL NFR BLD AUTO: 216 K/UL (ref 130–400)
GLUCOSE SERPL-MCNC: 86 MG/DL (ref 70–99)
HCT VFR BLD CALC: 42.6 % (ref 42–52)
HGB BLD-MCNC: 14.9 G/DL (ref 14–18)
IG#: 0.19 K/UL (ref 0–0.02)
IMM GRANULOCYTES NFR BLD AUTO: 20.6 %
LYMPHOCYTES # BLD: 1.73 K/UL (ref 1.2–3.4)
MCH RBC QN AUTO: 31.6 PG (ref 25–34)
MCHC RBC AUTO-ENTMCNC: 35 G/DL (ref 32–36)
MCV RBC AUTO: 90.3 FL (ref 80–100)
MONO ABS #: 1.01 K/UL (ref 0.11–0.59)
MONOCYTES NFR BLD: 12.1 %
NEUT ABS #: 5.17 K/UL (ref 1.4–6.5)
NEUTROPHILS # BLD AUTO: 2.7 %
NEUTROPHILS NFR BLD AUTO: 61.7 %
PMV BLD AUTO: 9.8 FL (ref 7.4–10.4)
POTASSIUM SERPL-SCNC: 4.1 MMOL/L (ref 3.5–5.1)
RED CELL DISTRIBUTION WIDTH CV: 13.5 % (ref 11.5–14.5)
RED CELL DISTRIBUTION WIDTH SD: 44 FL (ref 36.4–46.3)
SODIUM SERPL-SCNC: 139 MMOL/L (ref 136–145)
WBC # BLD AUTO: 8.38 K/UL (ref 4.8–10.8)

## 2018-08-06 RX ADMIN — Medication SCH MG: at 07:57

## 2018-08-06 RX ADMIN — LORAZEPAM PRN MG: 2 INJECTION INTRAMUSCULAR; INTRAVENOUS at 22:30

## 2018-08-06 RX ADMIN — IMIPENEM AND CILASTATIN SODIUM SCH MLS/HR: 500; 500 INJECTION, POWDER, FOR SOLUTION INTRAVENOUS at 11:57

## 2018-08-06 RX ADMIN — IMIPENEM AND CILASTATIN SODIUM SCH MLS/HR: 500; 500 INJECTION, POWDER, FOR SOLUTION INTRAVENOUS at 19:31

## 2018-08-06 RX ADMIN — ATORVASTATIN CALCIUM SCH MG: 10 TABLET, FILM COATED ORAL at 07:57

## 2018-08-06 RX ADMIN — VANCOMYCIN HYDROCHLORIDE SCH MLS/HR: 1 INJECTION, POWDER, LYOPHILIZED, FOR SOLUTION INTRAVENOUS at 04:00

## 2018-08-06 RX ADMIN — LEVOTHYROXINE SODIUM SCH MCG: 125 TABLET ORAL at 06:29

## 2018-08-06 RX ADMIN — Medication SCH TAB: at 07:57

## 2018-08-06 RX ADMIN — ENOXAPARIN SODIUM SCH MG: 40 INJECTION SUBCUTANEOUS at 20:30

## 2018-08-06 NOTE — CARDIOLOGY FOLLOW-UP
Subjective


General


Date of Service:


Aug 6, 2018.


Pt evaluation today including:  conversation w/ patient, physical exam, chart 

review, lab review, review of studies, review of inpatient medication list





History of Present Illness


The patient is a 68 year old male seen in follow-up.  Feeling well from a 

cardiovascular perspective.  No fevers over the past 24 hours.  Gram-negative 

rods growing in 3 bottles.  Patient offers no complaints.  Sinus rhythm on 

telemetry.





Allergies


Coded Allergies:  


     No Known Allergies (Unverified , 8/2/18)





Social History


Smoking Status:  Never Smoker


Hx Tobacco Use In Past Year?:  No


Hx Alcohol Use - Type And Amou:  Yes (Wine and Martini 2 per day)


Hx Substance Use - Type And Am:  No





Problem List


Medical Problems:


(1) Fever


Status: Acute  





(2) Kidney stone


Status: Acute  





(3) Rash


Status: Acute  





(4) Renal colic


Status: Acute  





(5) Right flank pain


Status: Acute  











Review of Systems


Respiratory:  No cough, No wheezing, No shortness of breath, No dyspnea on 

exertion, No dyspnea at rest, No hemoptysis


Cardiac:  No chest pain, No orthopnea, No PND, No edema, No claudication, No 

palpitations





Physical Exam


Vital Signs





Last Vital Signs Documentation








  Date Time  Temp Pulse Resp B/P (MAP) Pulse Ox O2 Delivery O2 Flow Rate FiO2


 


8/6/18 08:23      Room Air  


 


8/6/18 07:35 36.7 69 18 147/97 (114) 94   


 


8/3/18 11:19       2 











Physical Exam


Constitutional:  


   General Apperance:  heathly-appearing


   Level of Distress:  NAD


Head:  normocephalic, atraumatic


Neck:  supple, trachea midline


Lungs:  


   Respiratory effort:  no dyspnea


   Auscultation:  breath sounds normal, no wheezing, no rales/crackles, no 

rhonchi


Cardiovascular:  


   Heart Auscultation:  RRR, normal S1, normal S2, no murmurs


Peripheral Pulses:  


   Radial Pulse:  normal on the left, normal on the right


Abdomen:  


   Bowel Sounds:  normal


   Inspection & Palpation:  soft, non-distended, no tenderness, guarding & 

rebound


Extremities:  no edema, pertinent finding ( purpuric blistering rash limited to 

left hand)


Neurologic:  


   Gait & Station:  pertinent finding (No focal motor deficit.)


   Cranial Nerves:  grossly intact





Assessment and Plan


Assessment and Plan


Final Impression:





1. Mitral valve endocarditis


    - HACEK suspected with 3/4 blood cultures positive for gram-negative bacilli


    - fever 8/3/2018 without recurrence





2. History of right-sided olecranon MRSA bursitis





Plan/recommendations:


Continue intravenous antibiotic therapy 6 weeks.  Antibiotic spectrum will be 

narrowed pending review of blood culture results and infectious disease 

recommendations.  Continue to monitor for signs of recurrent embolic 

phenomenon.  Patient may be transferred off telemetry to general medical floor 

at this time.





Laboratory Results





Last 24 Hours








Test


  8/5/18


17:21 8/6/18


06:04


 


Vancomycin Level Trough 15.2 mcg/ml  


 


White Blood Count  8.38 K/uL 


 


Red Blood Count  4.72 M/uL 


 


Hemoglobin  14.9 g/dL 


 


Hematocrit  42.6 % 


 


Mean Corpuscular Volume  90.3 fL 


 


Mean Corpuscular Hemoglobin  31.6 pg 


 


Mean Corpuscular Hemoglobin


Concent 


  35.0 g/dl 


 


 


Platelet Count  216 K/uL 


 


Mean Platelet Volume  9.8 fL 


 


Neutrophils (%) (Auto)  61.7 % 


 


Lymphocytes (%) (Auto)  20.6 % 


 


Monocytes (%) (Auto)  12.1 % 


 


Eosinophils (%) (Auto)  2.7 % 


 


Basophils (%) (Auto)  0.6 % 


 


Neutrophils # (Auto)  5.17 K/uL 


 


Lymphocytes # (Auto)  1.73 K/uL 


 


Monocytes # (Auto)  1.01 K/uL 


 


Eosinophils # (Auto)  0.23 K/uL 


 


Basophils # (Auto)  0.05 K/uL 


 


RDW Standard Deviation  44.0 fL 


 


RDW Coefficient of Variation  13.5 % 


 


Immature Granulocyte % (Auto)  2.3 % 


 


Immature Granulocyte # (Auto)  0.19 K/uL 


 


Sodium Level  139 mmol/L 


 


Potassium Level  4.1 mmol/L 


 


Chloride Level  106 mmol/L 


 


Carbon Dioxide Level  28 mmol/L 


 


Anion Gap  5.0 mmol/L 


 


Blood Urea Nitrogen  11 mg/dl 


 


Creatinine  0.88 mg/dl 


 


Est Creatinine Clear Calc


Drug Dose 


  80.7 ml/min 


 


 


Estimated GFR (


American) 


  100.9 


 


 


Estimated GFR (Non-


American 


  87.0 


 


 


BUN/Creatinine Ratio  12.9 


 


Random Glucose  86 mg/dl 


 


Calcium Level  8.4 mg/dl

## 2018-08-06 NOTE — PROGRESS NOTE
Medicine Progress Note


Date & Time of Visit:


Aug 6, 2018 at 15:56.


Subjective


Pt was seen and examined 


Lying in bed with no distress


Pt said that he feels fine


He has not had any fever in the past 48hrs


He is hoping that he can be discharged tomorrow


Denies any chest pain, palpitation, dizziness and SOB





Objective





Last 8 Hrs








  Date Time  Temp Pulse Resp B/P (MAP) Pulse Ox O2 Delivery O2 Flow Rate FiO2


 


8/6/18 15:45 36.6 71 16 132/87 (102) 96 Room Air  


 


8/6/18 15:20     96 Room Air  


 


8/6/18 11:37 36.7 70 18 135/94 (108) 97 Room Air  


 


8/6/18 08:23      Room Air  








Physical Exam:


General- No acute distress 


Head-  atraumatic


Eyes- PERRL, EOMI


ENT- oropharynx clear


Neck- supple, no JVD


Lungs- clear to auscultation 


Heart- regular rhythm; no murmur


Abdomen- normal bowel sounds, soft


Extremities-no calf tenderness


Neuro- alert, oriented x 3; PERRL, EOMI; no facial palsy


Skin- warm & dry


Laboratory Results:





Last 24 Hours








Test


  8/5/18


17:21 8/6/18


06:04


 


Vancomycin Level Trough 15.2 mcg/ml  


 


White Blood Count  8.38 K/uL 


 


Red Blood Count  4.72 M/uL 


 


Hemoglobin  14.9 g/dL 


 


Hematocrit  42.6 % 


 


Mean Corpuscular Volume  90.3 fL 


 


Mean Corpuscular Hemoglobin  31.6 pg 


 


Mean Corpuscular Hemoglobin


Concent 


  35.0 g/dl 


 


 


Platelet Count  216 K/uL 


 


Mean Platelet Volume  9.8 fL 


 


Neutrophils (%) (Auto)  61.7 % 


 


Lymphocytes (%) (Auto)  20.6 % 


 


Monocytes (%) (Auto)  12.1 % 


 


Eosinophils (%) (Auto)  2.7 % 


 


Basophils (%) (Auto)  0.6 % 


 


Neutrophils # (Auto)  5.17 K/uL 


 


Lymphocytes # (Auto)  1.73 K/uL 


 


Monocytes # (Auto)  1.01 K/uL 


 


Eosinophils # (Auto)  0.23 K/uL 


 


Basophils # (Auto)  0.05 K/uL 


 


RDW Standard Deviation  44.0 fL 


 


RDW Coefficient of Variation  13.5 % 


 


Immature Granulocyte % (Auto)  2.3 % 


 


Immature Granulocyte # (Auto)  0.19 K/uL 


 


Sodium Level  139 mmol/L 


 


Potassium Level  4.1 mmol/L 


 


Chloride Level  106 mmol/L 


 


Carbon Dioxide Level  28 mmol/L 


 


Anion Gap  5.0 mmol/L 


 


Blood Urea Nitrogen  11 mg/dl 


 


Creatinine  0.88 mg/dl 


 


Est Creatinine Clear Calc


Drug Dose 


  80.7 ml/min 


 


 


Estimated GFR (


American) 


  100.9 


 


 


Estimated GFR (Non-


American 


  87.0 


 


 


BUN/Creatinine Ratio  12.9 


 


Random Glucose  86 mg/dl 


 


Calcium Level  8.4 mg/dl 











Assessment & Plan


ENDOCARDITIS


Present with fever/chills and osler's nodes/Janeway lesions on the hand


Recently completed treatment for shingles


Hx of right septic olecranon bursitis in 3/18 with MRSA


ECHO showed small linear echodensity noted in the long axis view on the 

noncoronary cusp of aortic valve


DARION performed today by Dr. Briggs showed small mobile echodensity on the 

mitral valve consistent with small vegetation. and small linear echodensity on 

the aortic valve.


ID on board recommended to continue Vanco and adding IV Rocephin


Procalcitonin normal


Blood cx no growth 


Will need a long course IV abx treatment for 6 weeks


Will get a PICC line once blood cx showed no growth 


Will need a repeat DARION in 4-6 weeks to ensure stability.


8/6


Blood cx positive for gram negative bacilli 3/4 sets (Campylobacter in 1 of the 

bottle)


Might be related to one of the organism from the HACEK group


Pt said that he had dental work done 5 days before onset symptoms


ID D/C  Rocephin and Vanco 


Afebrile for over 48Hrs


Starting on Primaxin q8h IV 


Case management will check with insurance about abx coverage


Repeat blood cx pending


Will need a long course IV abx treatment for 6 weeks


Will get a PICC line once blood cx showed no growth 


Will need a repeat DARION in 4-6 weeks to ensure stability.








HYPOTHYROIDISM


On levothyroxine





HLD


On atorvastatin





DVT Prophylaxis


On Lovenox sub q





Code Status FULL CODE


Current Inpatient Medications:





Current Inpatient Medications








 Medications


  (Trade)  Dose


 Ordered  Sig/Mera


 Route  Start Time


 Stop Time Status Last Admin


Dose Admin


 


 Enoxaparin Sodium


  (Lovenox Inj)  40 mg  Q24H


 SC  8/2/18 21:00


 9/1/18 20:59  8/5/18 22:03


40 MG


 


 Acetaminophen


  (Tylenol Tab)  650 mg  Q4H  PRN


 PO  8/2/18 22:15


 9/1/18 22:14  8/3/18 16:27


650 MG


 


 Nitroglycerin


  (Nitrostat Tab)  0.4 mg  UD  PRN


 SL  8/2/18 22:15


 9/1/18 22:14   


 


 


 Aspirin


  (Ecotrin Tab)  81 mg  DAILY


 PO  8/3/18 09:00


 9/2/18 08:59  8/6/18 07:57


81 MG


 


 Atorvastatin


 Calcium


  (Lipitor Tab)  10 mg  QAM


 PO  8/3/18 09:00


 9/2/18 08:59  8/6/18 07:57


10 MG


 


 Levothyroxine


 Sodium


  (Synthroid Tab)  125 mcg  DAILYBB


 PO  8/3/18 06:00


 9/2/18 06:59  8/6/18 06:29


125 MCG


 


 Multivitamins/


 Minerals


  (Multivitamin W/


 Minerals Tab)  1 tab  QAM


 PO  8/3/18 09:00


 9/2/18 08:59  8/6/18 07:57


1 TAB


 


 Tramadol HCl


  (Ultram Tab)  25 mg  Q6H  PRN


 PO  8/2/18 22:15


 9/1/18 22:14  8/3/18 20:27


25 MG


 


 Prochlorperazine


 Edisylate 5 mg/


 Syringe  5 ml @ 5


 mls/min  Q6H  PRN


 IV  8/2/18 22:15


 9/1/18 22:14   


 


 


 Lorazepam


  (Ativan Inj)  0.5 mg  Q4H  PRN


 IV  8/2/18 22:15


 9/1/18 22:14  8/5/18 22:02


0.5 MG


 


 Morphine Sulfate


  (MoRPHine


 SULFATE INJ)  4 mg  Q3H  PRN


 IV  8/2/18 22:15


 8/16/18 22:14   


 


 


 Loperamide HCl


  (Imodium Cap)  2 mg  UD  PRN


 PO  8/3/18 21:30


 9/2/18 21:29  8/4/18 06:51


2 MG


 


 Imipenem/


 Cilastatin Sodium


 1000 mg/Dextrose  270 ml @ 


 270 mls/hr  Q8H


 IV  8/6/18 12:00


 8/20/18 11:59  8/6/18 11:57


270 MLS/HR

## 2018-08-06 NOTE — PROGRESS NOTE
Subjective


Date of Service:


Aug 6, 2018.


Subjective


Pt evaluation today including:  conversation w/ patient, physical exam, chart 

review, lab review


pt seen in followup, initial blood cultures with campylobacter 3/4 sets. repeat 

pending. Initially had diarrhea, fever and rash, all now resolved. no abd pain, 

denies n/v/d. no f/c. currently on vanco and ctx. tolerating well. oob  to 

chair. wbc nml. all remaining ros reviewed and are negative.





Problem List


Medical Problems:


(1) Fever


Status: Acute  





(2) Kidney stone


Status: Acute  





(3) Rash


Status: Acute  





(4) Renal colic


Status: Acute  





(5) Right flank pain


Status: Acute  











Objective


Vital Signs











  Date Time  Temp Pulse Resp B/P (MAP) Pulse Ox O2 Delivery O2 Flow Rate FiO2


 


8/6/18 08:23      Room Air  


 


8/6/18 07:35 36.7 69 18 147/97 (114) 94 Room Air  


 


8/6/18 05:28 37.0 64 19 142/93 (109) 96 Room Air  


 


8/5/18 23:46 37.3 77 18 111/72 (85) 97 Room Air  


 


8/5/18 20:00      Room Air  


 


8/5/18 19:12 37.2 77 18 130/88 (102) 94   


 


8/5/18 14:55 36.3 71 20 132/89 (103) 98 Room Air  


 


8/5/18 11:28 36.9 69 20 138/84 (102) 98 Room Air  











Physical Exam


General Appearance:  WD/WN, no apparent distress


Eyes:  normal inspection, EOMI


Neck:  supple


Respiratory/Chest:  lungs clear, normal breath sounds, no respiratory distress


Cardiovascular:  regular rate, rhythm, no edema


Abdomen:  non tender, soft


Extremities:  non-tender, normal inspection, no pedal edema


Neurologic/Psychiatric:  alert, oriented x 3


Skin:  normal color





Laboratory Results











Item Value  Date Time


 


Blood Culture - Preliminary Resulted 8/2/18 1740





Blood Gram Negative Bacilli 


 


Blood Culture - Preliminary Resulted 8/2/18 1750





Blood Gram Negative Bacilli 


 


Blood Culture - Preliminary Resulted 8/2/18 1800





Blood Campylobacter Jejuni 


 


Blood Culture - Preliminary Resulted 8/2/18 1800





Blood Campylobacter Jejuni 


 


Blood Culture - Preliminary Resulted 8/2/18 1808





Blood NO GROWTH TO DATE. 











Last 24 Hours








Test


  8/5/18


17:21 8/6/18


06:04


 


Vancomycin Level Trough 15.2 mcg/ml  


 


White Blood Count  8.38 K/uL 


 


Red Blood Count  4.72 M/uL 


 


Hemoglobin  14.9 g/dL 


 


Hematocrit  42.6 % 


 


Mean Corpuscular Volume  90.3 fL 


 


Mean Corpuscular Hemoglobin  31.6 pg 


 


Mean Corpuscular Hemoglobin


Concent 


  35.0 g/dl 


 


 


Platelet Count  216 K/uL 


 


Mean Platelet Volume  9.8 fL 


 


Neutrophils (%) (Auto)  61.7 % 


 


Lymphocytes (%) (Auto)  20.6 % 


 


Monocytes (%) (Auto)  12.1 % 


 


Eosinophils (%) (Auto)  2.7 % 


 


Basophils (%) (Auto)  0.6 % 


 


Neutrophils # (Auto)  5.17 K/uL 


 


Lymphocytes # (Auto)  1.73 K/uL 


 


Monocytes # (Auto)  1.01 K/uL 


 


Eosinophils # (Auto)  0.23 K/uL 


 


Basophils # (Auto)  0.05 K/uL 


 


RDW Standard Deviation  44.0 fL 


 


RDW Coefficient of Variation  13.5 % 


 


Immature Granulocyte % (Auto)  2.3 % 


 


Immature Granulocyte # (Auto)  0.19 K/uL 


 


Sodium Level  139 mmol/L 


 


Potassium Level  4.1 mmol/L 


 


Chloride Level  106 mmol/L 


 


Carbon Dioxide Level  28 mmol/L 


 


Anion Gap  5.0 mmol/L 


 


Blood Urea Nitrogen  11 mg/dl 


 


Creatinine  0.88 mg/dl 


 


Est Creatinine Clear Calc


Drug Dose 


  80.7 ml/min 


 


 


Estimated GFR (


American) 


  100.9 


 


 


Estimated GFR (Non-


American 


  87.0 


 


 


BUN/Creatinine Ratio  12.9 


 


Random Glucose  86 mg/dl 


 


Calcium Level  8.4 mg/dl 











Assessment and Plan





(1) Sepsis


Assessment & Plan:  will change to imipenem. unable to find any literature 

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 Also found 

no literature with regards to IE from c. jejuni. spoke with pt at length, 

agreeable to tid imipenem, will place on 1g every 8 hours x 14 days. stop vanco 

and ctx. ok for d/c when arrangements made for outpt abx. 





time spent 60 minutes





(2) Bacteremia due to Campylobacter

## 2018-08-07 VITALS
DIASTOLIC BLOOD PRESSURE: 84 MMHG | OXYGEN SATURATION: 96 % | HEART RATE: 66 BPM | SYSTOLIC BLOOD PRESSURE: 126 MMHG | TEMPERATURE: 97.88 F

## 2018-08-07 VITALS
SYSTOLIC BLOOD PRESSURE: 125 MMHG | TEMPERATURE: 98.96 F | DIASTOLIC BLOOD PRESSURE: 84 MMHG | OXYGEN SATURATION: 96 % | HEART RATE: 64 BPM

## 2018-08-07 VITALS
TEMPERATURE: 97.88 F | DIASTOLIC BLOOD PRESSURE: 84 MMHG | OXYGEN SATURATION: 96 % | SYSTOLIC BLOOD PRESSURE: 126 MMHG | HEART RATE: 66 BPM

## 2018-08-07 VITALS
OXYGEN SATURATION: 97 % | SYSTOLIC BLOOD PRESSURE: 124 MMHG | DIASTOLIC BLOOD PRESSURE: 82 MMHG | TEMPERATURE: 98.24 F | HEART RATE: 81 BPM

## 2018-08-07 VITALS — OXYGEN SATURATION: 96 %

## 2018-08-07 VITALS
HEART RATE: 74 BPM | DIASTOLIC BLOOD PRESSURE: 85 MMHG | SYSTOLIC BLOOD PRESSURE: 125 MMHG | TEMPERATURE: 97.88 F | OXYGEN SATURATION: 97 %

## 2018-08-07 VITALS
SYSTOLIC BLOOD PRESSURE: 143 MMHG | OXYGEN SATURATION: 97 % | HEART RATE: 75 BPM | DIASTOLIC BLOOD PRESSURE: 92 MMHG | TEMPERATURE: 97.52 F

## 2018-08-07 PROCEDURE — 02HV33Z INSERTION OF INFUSION DEVICE INTO SUPERIOR VENA CAVA, PERCUTANEOUS APPROACH: ICD-10-PCS | Performed by: HOSPITALIST

## 2018-08-07 RX ADMIN — ATORVASTATIN CALCIUM SCH MG: 10 TABLET, FILM COATED ORAL at 08:19

## 2018-08-07 RX ADMIN — LEVOTHYROXINE SODIUM SCH MCG: 125 TABLET ORAL at 05:28

## 2018-08-07 RX ADMIN — Medication SCH TAB: at 08:18

## 2018-08-07 RX ADMIN — IMIPENEM AND CILASTATIN SODIUM SCH MLS/HR: 500; 500 INJECTION, POWDER, FOR SOLUTION INTRAVENOUS at 04:16

## 2018-08-07 RX ADMIN — Medication SCH MG: at 08:19

## 2018-08-07 RX ADMIN — IMIPENEM AND CILASTATIN SODIUM SCH MLS/HR: 500; 500 INJECTION, POWDER, FOR SOLUTION INTRAVENOUS at 21:48

## 2018-08-07 RX ADMIN — IMIPENEM AND CILASTATIN SODIUM SCH MLS/HR: 500; 500 INJECTION, POWDER, FOR SOLUTION INTRAVENOUS at 11:50

## 2018-08-07 RX ADMIN — ENOXAPARIN SODIUM SCH MG: 40 INJECTION SUBCUTANEOUS at 20:26

## 2018-08-07 NOTE — PROGRESS NOTE
Medicine Progress Note


Date & Time of Visit:


Aug 7, 2018 at 19:39.


Subjective


Pt was seen and examined 


Lying in bed with no distress


Pt said that he feels fine


Very anxious to go home today


Had Picc line placed today


Denies any chest pain, palpitation, dizziness and fever





Objective





Last 8 Hrs








  Date Time  Temp Pulse Resp B/P (MAP) Pulse Ox O2 Delivery O2 Flow Rate FiO2


 


8/7/18 16:08 36.6 66 18  96 Room Air  


 


8/7/18 15:57 36.6 66 18 126/84 (98) 96 Room Air  








Physical Exam:


General- No acute distress 


Head-  atraumatic


Eyes- PERRL, EOMI


ENT- oropharynx clear


Neck- supple, no JVD


Lungs- clear to auscultation 


Heart- regular rhythm; no murmur


Abdomen- normal bowel sounds, soft


Extremities-no calf tenderness


Neuro- alert, oriented x 3; PERRL, EOMI; no facial palsy


Skin- warm & dry





Assessment & Plan


ENDOCARDITIS


Present with fever/chills and osler's nodes/Janeway lesions on the hand


Recently completed treatment for shingles


Hx of right septic olecranon bursitis in 3/18 with MRSA


ECHO showed small linear echodensity noted in the long axis view on the 

noncoronary cusp of aortic valve


DARION performed today by Dr. Briggs showed small mobile echodensity on the 

mitral valve consistent with small vegetation. and small linear echodensity on 

the aortic valve.


ID on board recommended to continue Vanco and adding IV Rocephin


Procalcitonin normal


Blood cx no growth 


Will need a long course IV abx treatment for 6 weeks


Will get a PICC line once blood cx showed no growth 


Will need a repeat DARION in 4-6 weeks to ensure stability.


8/7


Blood cx positive for gram negative bacilli 3/4 sets (Campylobacter in 1 of the 

bottle)


Might be related to one of the organism from the HACEK group


Pt said that he had dental work done 5 days before onset symptoms


ID D/C  Rocephin and Vanco 


Afebrile for over 72 Hrs


Continue Primaxin q8h IV for 21 days as per ID


Case management will arrange for abx infusion


Repeat blood cx  no growth so far


Consent obtained and signed from pt


Picc line placed today


Will need a repeat DARION in 4-6 weeks to ensure stability.


Will give next dose of abx late night, since pt wants to go home 


Follow up with ID








HYPOTHYROIDISM


On levothyroxine





HLD


On atorvastatin





DVT Prophylaxis


On Lovenox sub q





Code Status FULL CODE





Disposition 


Discharge home today


Current Inpatient Medications:





Current Inpatient Medications








 Medications


  (Trade)  Dose


 Ordered  Sig/Mera


 Route  Start Time


 Stop Time Status Last Admin


Dose Admin


 


 Enoxaparin Sodium


  (Lovenox Inj)  40 mg  Q24H


 SC  8/2/18 21:00


 9/1/18 20:59  8/6/18 20:30


40 MG


 


 Acetaminophen


  (Tylenol Tab)  650 mg  Q4H  PRN


 PO  8/2/18 22:15


 9/1/18 22:14  8/3/18 16:27


650 MG


 


 Nitroglycerin


  (Nitrostat Tab)  0.4 mg  UD  PRN


 SL  8/2/18 22:15


 9/1/18 22:14   


 


 


 Aspirin


  (Ecotrin Tab)  81 mg  DAILY


 PO  8/3/18 09:00


 9/2/18 08:59  8/7/18 08:19


81 MG


 


 Atorvastatin


 Calcium


  (Lipitor Tab)  10 mg  QAM


 PO  8/3/18 09:00


 9/2/18 08:59  8/7/18 08:19


10 MG


 


 Levothyroxine


 Sodium


  (Synthroid Tab)  125 mcg  DAILYBB


 PO  8/3/18 06:00


 9/2/18 06:59  8/7/18 05:28


125 MCG


 


 Multivitamins/


 Minerals


  (Multivitamin W/


 Minerals Tab)  1 tab  QAM


 PO  8/3/18 09:00


 9/2/18 08:59  8/7/18 08:18


1 TAB


 


 Tramadol HCl


  (Ultram Tab)  25 mg  Q6H  PRN


 PO  8/2/18 22:15


 9/1/18 22:14  8/3/18 20:27


25 MG


 


 Prochlorperazine


 Edisylate 5 mg/


 Syringe  5 ml @ 5


 mls/min  Q6H  PRN


 IV  8/2/18 22:15


 9/1/18 22:14   


 


 


 Lorazepam


  (Ativan Inj)  0.5 mg  Q4H  PRN


 IV  8/2/18 22:15


 9/1/18 22:14  8/6/18 22:30


0.5 MG


 


 Morphine Sulfate


  (MoRPHine


 SULFATE INJ)  4 mg  Q3H  PRN


 IV  8/2/18 22:15


 8/16/18 22:14   


 


 


 Loperamide HCl


  (Imodium Cap)  2 mg  UD  PRN


 PO  8/3/18 21:30


 9/2/18 21:29  8/4/18 06:51


2 MG


 


 Imipenem/


 Cilastatin Sodium


 1000 mg/Dextrose  270 ml @ 


 270 mls/hr  Q8H


 IV  8/6/18 12:00


 8/20/18 11:59  8/7/18 11:50


270 MLS/HR

## 2018-08-07 NOTE — DISCHARGE INSTRUCTIONS
Discharge Instructions


Date of Service


Aug 7, 2018.





Admission


Reason for Admission:  Sepsis





Discharge


Discharge Diagnosis / Problem:  Endocarditis, Sepsis





Discharge Goals


Goal(s):  Decrease discomfort, Improve function, Improve disease control





Activity Recommendations


Activity Limitations:  resume your previous activity (as tolerated)





.





Instructions / Follow-Up


Instructions / Follow-Up


Follow up with your primary care provider Dr. Post on 8/13 @ 9:45 AM


Follow up with Infectious disease Dr. Blair or Dr. Cole (Please call for 

the appointment)


You will need a repeat DARION between 4 to 6 weeks (Your primary care physician 

will arrange for the referral)


Complete the course for the IV antibiotic infusion (case management already 

arranged for the antibiotic)


Seek medical attention if you develop any fever





Current Hospital Diet


Patient's current hospital diet: AHA Diet (Heart Healthy), Low Lactose Diet





Discharge Diet


Recommended Diet:  AHA Diet (Heart Healthy)





Procedures


Procedures Performed:  


DARION





Pending Studies


Studies pending at discharge:  yes


List of pending studies:  


Final repeat blood cx





Laboratory Results





Hemoglobin A1c








Test


  8/2/18


18:00 Range/Units


 


 


Estimated Average Glucose 108   mg/dl


 


Hemoglobin A1c 5.4  4.5-5.6  %











Medical Emergencies








.


Who to Call and When:





Medical Emergencies:  If at any time you feel your situation is an emergency, 

please call 911 immediately.





.





Non-Emergent Contact


Non-Emergency issues call your:  Primary Care Provider


Call Non-Emergent contact if:  temperature is above 101, you have any 

medication questions





.


.








"Provider Documentation" section prepared by Ollie Coker.








.

## 2018-08-07 NOTE — PROGRESS NOTE
Subjective


Date of Service:


Aug 7, 2018.


Subjective


pt repeat blood cultures negative. tolerating imipenem, agreeable to home abx. 

afebrile. wbc nml. spoke to primary, for picc line today.





Problem List


Medical Problems:


(1) Fever


Status: Acute  





(2) Kidney stone


Status: Acute  





(3) Rash


Status: Acute  





(4) Renal colic


Status: Acute  





(5) Right flank pain


Status: Acute  











Objective


Vital Signs











  Date Time  Temp Pulse Resp B/P (MAP) Pulse Ox O2 Delivery O2 Flow Rate FiO2


 


8/7/18 11:36 36.4 75 18 143/92 (109) 97 Room Air  


 


8/7/18 08:00      Room Air  


 


8/7/18 07:07 36.6 74 20 125/85 (98) 97 Room Air  


 


8/7/18 03:44 37.2 64 18 125/84 (98) 96 Room Air  


 


8/6/18 23:38 36.5 65 18 143/90 (107) 98 Room Air  


 


8/6/18 23:30      Room Air  


 


8/6/18 19:44 36.3 78 18 136/92 (107) 98 Room Air  


 


8/6/18 15:45 36.6 71 16 132/87 (102) 96 Room Air  


 


8/6/18 15:20     96 Room Air  











Laboratory Results











Item Value  Date Time


 


Blood Culture - Preliminary Resulted 8/2/18 1740





Blood Gram Negative Bacilli 


 


Blood Culture - Preliminary Resulted 8/2/18 1750





Blood Gram Negative Bacilli 


 


Blood Culture - Preliminary Resulted 8/2/18 1800





Blood Campylobacter Jejuni 


 


Blood Culture - Preliminary Resulted 8/5/18 1338





Blood NO GROWTH TO DATE. 


 


Blood Culture - Preliminary Resulted 8/5/18 1312





Blood NO GROWTH TO DATE. 


 


Blood Culture - Preliminary Resulted 8/2/18 1800





Blood Campylobacter Jejuni 


 


Blood Culture - Preliminary Resulted 8/2/18 1750





Blood Campylobacter Jejuni 


 


Blood Culture - Preliminary Resulted 8/2/18 1740





Blood Campylobacter Jejuni 











Assessment and Plan





(1) Sepsis


Assessment & Plan:  will change to imipenem. unable to find any literature 

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 Also found 

no literature with regards to IE from c. jejuni. spoke with pt at length, 

agreeable to tid imipenem, will place on 1g every 8 hours x 21 days. for picc 

today. ok for d/c when arrangements made for outpt abx. 








(2) Bacteremia due to Campylobacter

## 2018-08-09 NOTE — DISCHARGE SUMMARY
Discharge Summary


Date of Service


Aug 9, 2018.





Discharge Summary


Admission Date:


Aug 2, 2018 at 21:24


Discharge Date:  Aug 7, 2018


Discharge Disposition:  Home with services


Principal Diagnosis:


Endocarditis


Secondary Diagnoses/Problems:


Dyslipidemia


HYPOTHYROIDISM


Procedures:


DARION








SINGLE VIEW CHEST





CLINICAL HISTORY:  Sepsis.





FINDINGS: An AP, portable, upright chest radiograph is compared to study dated


3/27/2018. The examination is mildly degraded by portable technique and patient


rotation.  The heart is top normal for projection and there is mild


atherosclerotic calcification of the thoracic aorta. The pulmonary vasculature


is noncongested. There is mild bibasilar atelectasis. The lungs and pleural


spaces are otherwise clear. No pneumothorax is seen. The skeletal structures are


osteopenic. The bony thorax is grossly intact.





IMPRESSION: No acute cardiopulmonary abnormality.











Electronically signed by:  Tito Arreaga M.D.


2018 5:45 PM





Dictated Date/Time:  2018 5:45 PM








ECHO


Interpretation Summary


* Name: GOVIND MACHUCA  Study Date: 2018 06:56 AM  BP: 123/73 mmHg


* MRN: [id number]  Patient Location: [facility]\S\1  HR: 82


* : 1950 (M/d/yyyy)  Gender: Male  Height: 70 in


* Age: 68 yrs  Ethnicity: [place]  Weight: 189 lb


* Ordering Physician: Brandan Briggs


* Referring Physician: Self, Referred


* Performed By: ERIC Renteria RCS


* Accession# [id number]  Account# [id number]


* Reason For Study: Endocarditis


* BSA: 2.0 m2


* -- Conclusions --


* There is normal left ventricular wall thickness.


* The left ventricular wall motion is normal.


* The LV ejection Fraction = 60-65%.


* Mild aortic regurgitation.


* There is a small linear mobile echodensity, anterior to the anterior portion 

of the mitral valve annulus measuring 0.8 cm x 0.2 cm (image 98).  This could 

be artifactual, however given clinical history, agitation must be considered, 

and the transesophageal echocardiogram is recommended for further assessment.


Procedure Details


* A complete two-dimensional transthoracic echocardiogram was performed (2D, M-

mode, Doppler and color flow Doppler).


Left Ventricle


* The left ventricle is normal in size.


* There is normal left ventricular wall thickness.


* Left ventricular systolic function is normal.


* Ejection Fraction = 60-65%.


* The left ventricular wall motion is normal.


Right Ventricle


* The right ventricle is normal in size and function.


Atria


* The left atrial size is normal.


* Right atrial size is normal.


* There is no evidence of atrial septal defect, but resolution does not allow 

assessment for a patent foramen ovale.


Mitral Valve


* There is a small linear mobile echodensity, anterior to the anterior portion 

of the mitral valve annulus measuring 0.8 cm x 0.2 cm (image 98).  This could 

be artifactual, however given clinical history, agitation must be considered, 

and the transesophageal echocardiogram is recommended for further assessment.


* There is no mitral valve stenosis.


* Significant mitral regurgitation is absent.


Tricuspid Valve


* The tricuspid valve is normal.


* There is no tricuspid stenosis.


* Significant tricuspid regurgitation is absent.


* Doppler findings do not suggest pulmonary hypertension.


Aortic Valve


* The aortic valve is trileaflet.


* Aortic valve sclerosis mild, without significant aortic valvular stenosis.


* Aortic stenosis is absent.


* Mild aortic regurgitation.


Pulmonic Valve


* The pulmonary valve is not well seen, but the Doppler examination is normal 

without significant regurgitation or stenosis.


Great Vessels


* The aortic root and proximal ascending aorta are normal sized.


Pericardium/Pleural


* There is no pericardial effusion.


Right Ventricle


* A moderator band is seen in the right ventricle (normal anatomical variant).


Great Vessels


* Normal inferior vena cava diameter and respiratory variation suggests normal 

central venous pressure.


Left Ventricular Diastolic Function


* Grade I diastolic dysfunction, (abnormal relaxation pattern).











Preliminary Cardiology Note


Procedure Date


Aug 3, 2018.





Pre-Procedure Diagnosis


rule out endocartitis





Post-Procedure Diagnosis


MV vegetation, endocarditis





Procedure(s) Performed





DARION under conscious sedation





Cardiologist


YONY Briggs DO





Assistant(s)


KELSEA Sarmiento, ANTHONY





Estimated Blood Loss


none





Preliminary Findings


Small mobile echodensity on the mitral valve consistent with small vegetation.


Small linear echodensity on the aortic valve.


No abscess.





Recommendations


Recommend antibiotic therapy for endocarditis.


Repeat DARION in 4-6 weeks to ensure stability.





Specimens





none





Anesthesia


Versed 3 mg IV, Fentanly 100 mcg IV





Complication(s)


None





Disposition


PCU











<Electronically signed by Brandan Briggs D.O.>





  Signed:  18 1141


  Signed:





Medication Reconciliation


New Medications:  


Imipenem-Cilastatin (Primaxin Iv) 1 Inj Inj


1 GM IV Q8H for 21 Days





 


Continued Medications:  


Aspirin (Aspirin 81) 81 Mg Tab


81 MG PO DAILY





Atorvastatin (Lipitor) 10 Mg Tab


10 MG PO QAM





Levothyroxine Sodium (Levothyroxine Sodium) 125 Mcg Tab


125 MCG PO QAM





Melatonin-Pyridoxine (Melatonin) 1 Tab Tab


200 MCG PO HS PRN for Sleep





Multivitamins/Minerals (Mvi With Minerals)  Tab


1 TAB PO QAM











Admission Information


HPI (per Admitting provider):


Pt is 67 y/o M with PMH hypothyroidism, hyperlipidemia presented to ER from 

PCPs office for fever and rash.  Patient states yesterday started with fever 

high of 102.2F and generalized weakness.  Taking ibuprofen for fever. Reports 2 

episodes of diarrhea today.  Approximately 2 weeks ago patient states started 

with erythematous painful lesion to left wrist which then progressed to left 

hand and left arm and also with some blisters with associated burning sensation 

in numbness of his fingertips. Also reports had swelling to left fingers.  Was 

seen by PCP diagnosed with possible shingles and started on Valtrex and Lyrica.

  Patient states finished Valtrex and stopped using Lyrica yesterday and 

reports upper arm and forearm with improvement of rash and edema of fingers.  

Reports continued sensitivity and burning sensation however is less than 

initial onset.  Patient denies other rashes or lesions. Patient with history 3/

2018 of right septic elbow bursitis (MRSA) S/P I&D by Dr. Byrne and also 

followed by ID - Dr Blair.  She was initially treated with vancomycin and Zosyn 

and transition to daptomycin and oral Bactrim.  Patient had PICC line at that 

time.  Patient states right elbow has been nontender, no recurrent erythema/

edema/discharge. Denies diaphoresis, N/V, melena, hematochezia, HA, dizziness, 

syncope, vision changes, neck pain, CP, SOB, orthopnea, palpitations, cough, 

sore throat, choking, otalgia, rhinorrhea, abdominal pain, urinary symptoms, 

weight changes.  Denies cardiac history or history of valve replacement





History stress echo 2017: No ischemia, EF: 60%.


Physical Exam (per Admitting):  


   General Appearance:  WD/WN, no apparent distress


   Head:  normocephalic, atraumatic


   Eyes:  normal inspection, PERRL, sclerae normal


   ENT:  hearing grossly normal, pharynx normal, + pertinent finding (Mucous 

membranes moist)


   Neck:  supple, trachea midline


   Respiratory/Chest:  lungs clear, normal breath sounds, no respiratory 

distress


   Cardiovascular:  regular rate, rhythm, no murmur, normal peripheral pulses


   Abdomen/GI:  normal bowel sounds, non tender, soft


   Back:  no CVA tenderness


   Extremities/Musculoskelatal:  no calf tenderness, normal capillary refill, 

no pedal edema, normal range of motion


   Neurologic/Psych:  alert, normal mood/affect, oriented x 3


   Skin:  warm/dry, + pertinent finding (LUE: Splinter hemorrhages noted middle

, index finger.  Scabs noted to left forearm, erythematous/purple nodules 

palmar aspect left hand. +petechiae fingers.)





Hospital Course


ENDOCARDITIS


Present with fever/chills and osler's nodes/Janeway lesions on the hand


Recently completed treatment for shingles


Hx of right septic olecranon bursitis in 3/18 with MRSA


ECHO showed small linear echodensity noted in the long axis view on the 

noncoronary cusp of aortic valve


DARION performed today by Dr. Briggs showed small mobile echodensity on the 

mitral valve consistent with small vegetation. and small linear echodensity on 

the aortic valve.


ID on board recommended to continue Vanco and adding IV Rocephin


Procalcitonin normal


Blood cx no growth 


Will need a long course IV abx treatment for 6 weeks


Will get a PICC line once blood cx showed no growth 


Will need a repeat DARION in 4-6 weeks to ensure stability.


8/7


Blood cx positive for gram negative bacilli 3/4 sets (Campylobacter in 1 of the 

bottle)


Might be related to one of the organism from the HACEK group


Pt said that he had dental work done 5 days before onset symptoms


ID D/C  Rocephin and Vanco 


Afebrile for over 72 Hrs


Continue Primaxin q8h IV for 21 days as per ID


Case management will arrange for abx infusion


Repeat blood cx  no growth so far


Consent obtained and signed from pt


Picc line placed today


Will need a repeat DARION in 4-6 weeks to ensure stability.


Will give next dose of abx late night, since pt wants to go home 


Follow up with ID








HYPOTHYROIDISM


On levothyroxine





HLD


On atorvastatin





DVT Prophylaxis


On Lovenox sub q





Code Status FULL CODE





Disposition 


Discharge home today


Total time spent on discharge = 35 minutes


This includes examination of the patient, discharge planning, medication 

reconciliation, and communication with other providers.





Discharge Instructions


Discharge Instructions


Date of Service


Aug 7, 2018.





Admission


Reason for Admission:  Sepsis





Discharge


Discharge Diagnosis / Problem:  Endocarditis, Sepsis





Discharge Goals


Goal(s):  Decrease discomfort, Improve function, Improve disease control





Activity Recommendations


Activity Limitations:  resume your previous activity (as tolerated)





.





Instructions / Follow-Up


Instructions / Follow-Up


Follow up with your primary care provider Dr. Post on  @ 9:45 AM


Follow up with Infectious disease Dr. Blair or Dr. Cole (Please call for 

the appointment)


You will need a repeat DARION between 4 to 6 weeks (Your primary care physician 

will arrange for the referral)


Complete the course for the IV antibiotic infusion (case management already 

arranged for the antibiotic)


Seek medical attention if you develop any fever





Current Hospital Diet


Patient's current hospital diet: AHA Diet (Heart Healthy), Low Lactose Diet





Discharge Diet


Recommended Diet:  AHA Diet (Heart Healthy)





Procedures


Procedures Performed:  


DARION





Pending Studies


Studies pending at discharge:  yes


List of pending studies:  


Final repeat blood cx





Laboratory Results





Hemoglobin A1c








Test


  18


18:00 Range/Units


 


 


Estimated Average Glucose 108   mg/dl


 


Hemoglobin A1c 5.4  4.5-5.6  %











Medical Emergencies








.


Who to Call and When:





Medical Emergencies:  If at any time you feel your situation is an emergency, 

please call 911 immediately.





.





Non-Emergent Contact


Non-Emergency issues call your:  Primary Care Provider


Call Non-Emergent contact if:  temperature is above 101, you have any 

medication questions





.


.








"Provider Documentation" section prepared by Ollie Coker.








.

















  Signed:  


  Signed:  





The status of this report is Draft


* If report status is Draft, the document has not been finalized by the 

responsible provider.  











Additional Copies To


Dev Post M.D.

## 2020-12-18 NOTE — INFECTIOUS DISEASE PROGRESS NT
Alban Burton was evaluated today and a DME order was entered for a wheeled walker because she requires this to successfully complete daily living tasks of personal cares, ambulating and hygiene. A wheeled walker is necessary due to the patient's unsteady gait, upper body weakness, and inability to  an ambulation device; and she can ambulate only by pushing a walker instead of a lesser assistive device such as a cane, crutch, or standard walker. The need for this equipment was discussed with the patient and she understands and is in agreement. Progress Note


Date of Service


Aug 5, 2018.





Subjective


Pt evaluation today including:  physical exam, chart review, lab review, review 

of studies, conversation w/ consultant, review of inpatient medication list


Patient continues to show improvement from admission, feeling better, remains 

afebrile.  Blood cultures now growing gram-negative bacilli.





   All Other Systems:  Reviewed and Negative





Medications





Current Inpatient Medications








 Medications


  (Trade)  Dose


 Ordered  Sig/Mera


 Route  Start Time


 Stop Time Status Last Admin


Dose Admin


 


 Enoxaparin Sodium


  (Lovenox Inj)  40 mg  Q24H


 SC  18 21:00


 18 20:59  18 22:36


40 MG


 


 Acetaminophen


  (Tylenol Tab)  650 mg  Q4H  PRN


 PO  18 22:15


 18 22:14  8/3/18 16:27


650 MG


 


 Nitroglycerin


  (Nitrostat Tab)  0.4 mg  UD  PRN


 SL  18 22:15


 18 22:14   


 


 


 Aspirin


  (Ecotrin Tab)  81 mg  DAILY


 PO  8/3/18 09:00


 18 08:59  18 07:55


81 MG


 


 Atorvastatin


 Calcium


  (Lipitor Tab)  10 mg  QAM


 PO  8/3/18 09:00


 18 08:59  18 07:55


10 MG


 


 Levothyroxine


 Sodium


  (Synthroid Tab)  125 mcg  DAILYBB


 PO  8/3/18 06:00


 18 06:59  18 06:03


125 MCG


 


 Multivitamins/


 Minerals


  (Multivitamin W/


 Minerals Tab)  1 tab  QAM


 PO  8/3/18 09:00


 18 08:59  18 07:54


1 TAB


 


 Tramadol HCl


  (Ultram Tab)  25 mg  Q6H  PRN


 PO  18 22:15


 18 22:14  8/3/18 20:27


25 MG


 


 Prochlorperazine


 Edisylate 5 mg/


 Syringe  5 ml @ 5


 mls/min  Q6H  PRN


 IV  18 22:15


 18 22:14   


 


 


 Lorazepam


  (Ativan Inj)  0.5 mg  Q4H  PRN


 IV  18 22:15


 18 22:14  18 22:36


0.5 MG


 


 Morphine Sulfate


  (MoRPHine


 SULFATE INJ)  4 mg  Q3H  PRN


 IV  18 22:15


 18 22:14   


 


 


 Vancomycin HCl


  (Consult)  1 ea  UD  PRN


 N/A  18 09:00


 9/3/18 08:59   


 


 


 Ceftriaxone


 Sodium 2000 mg/


 Dextrose  70 ml @ 


 100 mls/hr  Q24H


 IV  8/3/18 16:30


 18 16:29  18 15:14


100 MLS/HR


 


 Loperamide HCl


  (Imodium Cap)  2 mg  UD  PRN


 PO  8/3/18 21:30


 18 21:29  18 06:51


2 MG


 


 Vancomycin HCl


 1500 mg/Sodium


 Chloride  530 ml @ 


 200 mls/hr  Q10H


 IV  18 22:00


 9/15/18 21:59  18 17:53


200 MLS/HR











Objective


Vital Signs











  Date Time  Temp Pulse Resp B/P (MAP) Pulse Ox O2 Delivery O2 Flow Rate FiO2


 


18 14:55 36.3 71 20 132/89 (103) 98 Room Air  


 


18 11:28 36.9 69 20 138/84 (102) 98 Room Air  


 


18 08:00      Room Air  


 


18 07:03 36.7 72 20 135/87 (103) 97 Room Air  


 


18 03:15 37.0 72 16 121/81 (94) 96 Room Air  


 


18 00:01 37.1 70 18 125/76 (92) 96   


 


18 20:00      Room Air  


 


18 19:23 37.0 70 16 120/74 (89) 97 Room Air  











Physical Exam


General Appearance:  WD/WN, no apparent distress


Eyes:  normal inspection, EOMI, sclerae normal


ENT:  normal ENT inspection, pharynx normal


Neck:  supple, no adenopathy, thyroid normal, trachea midline


Respiratory/Chest:  chest non-tender, lungs clear, normal breath sounds, no 

respiratory distress


Cardiovascular:  regular rate, rhythm, no gallop, + systolic murmur


Abdomen:  normal bowel sounds, non tender, soft, no organomegaly


Extremities:  non-tender, no calf tenderness


Neurologic/Psychiatric:  alert, oriented x 3


Skin:  normal color, + pertinent finding (No obvious new embolic lesions)


Lymphatic:  no adenopathy





Laboratory Results





--------------------------------------------------------------------------------

------------





RUN DATE: 18                [facility]             

    PAGE 1   


RUN TIME: 4234                            Specimen Inquiry                    


--------------------------------------------------------------------------------

------------





PATIENT: GOVIND MACHUCA               ACCT #: [id number] LOC:  ATUL       U #

: [id number]


                                       AGE/SX: 68/M         ROOM: [facility]       REG

: 18


REG DR:  Ollie Coker M.D.       :    1950   BED:  1          DIS

:         


                                       STATUS: ADM IN       TLOC:           


--------------------------------------------------------------------------------

------------








SPEC #: [id number]        ESTEFANIA:      STATUS:  RES            REQ 

#: [id number]


                            RECD:      SUBM DR: Chandler Kuhn DO         


SOURCE: BLOOD               ENTR: 18-     ROLANDO DR: Dev Post M.D.

            


[facility]:                                                                        

            


ORDERED:  BLOOD CULTURE                                                        

   


--------------------------------------------------------------------------------

------------





  Procedure                         Result                         Verified    

       Site


--------------------------------------------------------------------------------

------------





  BLD CULT  Preliminary                                            


     Organism 1                     GRAM NEGATIVE BACILLI


        SENS                        SENSITIVITY TO FOLLOW





        Phoned Positive Blood Culture Gram Stain Report to CHANTELLE RANDLE on 18 At 0743 By CRISTEL. Results were


        verbalized back to CRISTEL.


        





--------------------------------------------------------------------------------

------------
































Last 24 Hours








Test


  18


05:59 18


17:21


 


Creatinine 0.74 mg/dl  


 


Est Creatinine Clear Calc


Drug Dose 95.9 ml/min 


  


 


 


Estimated GFR (


American) 108.3 


  


 


 


Estimated GFR (Non-


American 93.5 


  


 











Assessment and Plan


68-year-old male with episode of MRSA associated olecranon bursitis several 

months ago with apparent resolution,  presents with febrile illness with 

echocardiogram consistent with endocarditis and physical picture of multiple 

emboli to the left arm and hand. One blood culture positive for gram-negative 

bacilli,worry about possibility of HACEK pathogen. To continue on ceftriaxone 

pending final ID/sensitivities.

## 2021-01-11 NOTE — ORTHOPEDIC CONSULTATION
Continue to MONITOR CLOSELY to determine the need for TREATMENT and INCREASE/DECREASE in length of time till next follow up visit. Orthopedic Consultation


Date of Consultation:


Mar 27, 2018.


Attending Physician:





Reason for Consultation:


RIGHT ELBOW PAIN


History of Present Illness


Patient is a 69yo male complaining of right elbow pain and swelling. Patient 

bumped his elbow on some wood about 6 weeks ago. He denies any acute issues at 

that time. He had no open wounds. About 2 days ago patient began to develop 

increased pain and swelling. He doesn't have documented fever but says he's 

felt warm today. He is currently in the ED and being admitted for possible 

septic olecranon bursitis.





Past Medical/Surgical History


Medical Problems:


(1) Kidney stone


Status: Acute  





(2) Renal colic


Status: Acute  





(3) Right flank pain


Status: Acute  











Family History





FH: CHF (congestive heart failure)


  SISTER


FH: HTN (hypertension)


  BROTHER


  SISTER


No significant history


  FATHER


  MOTHER





Social History


Smoking Status:  Never Smoker


Alcohol Use:  socially (2 drinks every evening (wine) )


Marital Status:  


Housing Status:  lives with family


Occupation Status:  retired





Allergies


Coded Allergies:  


     No Known Allergies (Unverified , 3/27/18)





Home Medications


Scheduled


Aspirin (Aspirin Chewable), 81 MG PO QAM


Atorvastatin (Lipitor), 10 MG PO QAM


Levothyroxine Sodium (Levothyroxine Sodium), 125 MCG PO QAM


Multivitamins/Minerals (Mvi With Minerals), 1 TAB PO QAM





Scheduled PRN


Melatonin-Pyridoxine (Melatonin), 200 MCG PO HS PRN for Sleep





Current Inpatient Medications





Current Inpatient Medications








 Medications


  (Trade)  Dose


 Ordered  Sig/Mera


 Route  Start Time


 Stop Time Status Last Admin


Dose Admin


 


 Miscellaneous


 Information


  (Consult)  1 ea  UD  PRN


 N/A  3/27/18 12:45


 4/26/18 12:44   


 


 


 Vancomycin HCl


 1500 mg/Sodium


 Chloride  530 ml @ 


 200 mls/hr  NOW  STAT


 IV  3/27/18 13:42


 3/27/18 16:20  3/27/18 13:45


200 MLS/HR


 


 Acetaminophen


  (Tylenol Tab)  650 mg  Q4H  PRN


 PO  3/27/18 14:45


 4/26/18 14:44 UNV  


 


 


 Piperacillin Sod/


 Tazobactam Sod


 4.5 gm/Dextrose  120 ml @ 


 200 mls/hr  Q6


 IV  3/27/18 18:00


 4/10/18 17:59 UNV  


 


 


 Sodium Chloride  1,000 ml @ 


 80 mls/hr  [id number]


 IV  3/27/18 14:45


 3/28/18 03:14 UNV  


 


 


 Miscellaneous


 Information


  (Pharmacy


 Consult)  1 ea  NOW  STAT


 N/A  3/27/18 14:39


 3/27/18 14:40 UNV  


 











Physical Exam











  Date Time  Temp Pulse Resp B/P (MAP) Pulse Ox O2 Delivery O2 Flow Rate FiO2


 


3/27/18 15:06 39.3 111 20 143/91 95 Room Air  


 


3/27/18 12:17 37.2 107 18 141/89 97 Room Air  








General Appearance:  WD/WN, no apparent distress


Head:  normocephalic, atraumatic


Eyes:  normal inspection, PERRL


ENT:  normal ENT inspection, hearing grossly normal


Neck:  supple, no adenopathy


Extremities/Musculoskelatal:  normal capillary refill, normal range of motion, 

+ inflammation, + swelling, + pertinent finding (Patient has diffuse erythema 

consistent with cellulitis over much of the forearm and extending up the bicep 

area. He has no obvious streaking at this time. He has localized tenderness at 

the olecranon but is otherwise non tender. ROM of the wrist, elbow and shoulder 

fully intact without pain. NV intact.)


Neurologic/Psych:  alert, normal mood/affect, normal reflexes, oriented x 3


Skin:  no rash





Laboratory Results





Last 24 Hours








Test


  3/27/18


13:00 3/27/18


13:12 3/27/18


13:17


 


White Blood Count 16.65 K/uL   


 


Red Blood Count 4.85 M/uL   


 


Hemoglobin 15.5 g/dL   


 


Hematocrit 44.1 %   


 


Mean Corpuscular Volume 90.9 fL   


 


Mean Corpuscular Hemoglobin 32.0 pg   


 


Mean Corpuscular Hemoglobin


Concent 35.1 g/dl 


  


  


 


 


Platelet Count 207 K/uL   


 


Mean Platelet Volume 10.7 fL   


 


Neutrophils (%) (Auto) 84.8 %   


 


Lymphocytes (%) (Auto) 7.0 %   


 


Monocytes (%) (Auto) 7.4 %   


 


Eosinophils (%) (Auto) 0.2 %   


 


Basophils (%) (Auto) 0.1 %   


 


Neutrophils # (Auto) 14.11 K/uL   


 


Lymphocytes # (Auto) 1.17 K/uL   


 


Monocytes # (Auto) 1.24 K/uL   


 


Eosinophils # (Auto) 0.03 K/uL   


 


Basophils # (Auto) 0.02 K/uL   


 


RDW Standard Deviation 43.8 fL   


 


RDW Coefficient of Variation 13.3 %   


 


Immature Granulocyte % (Auto) 0.5 %   


 


Immature Granulocyte # (Auto) 0.08 K/uL   


 


Prothrombin Time 9.9 SECONDS   


 


Prothromb Time International


Ratio 0.9 


  


  


 


 


Sodium Level 137 mmol/L   


 


Potassium Level 4.1 mmol/L   


 


Chloride Level 105 mmol/L   


 


Carbon Dioxide Level 27 mmol/L   


 


Anion Gap 6.0 mmol/L   18.0 mmol/L 


 


Blood Urea Nitrogen 17 mg/dl   


 


Creatinine 0.96 mg/dl   


 


Est Creatinine Clear Calc


Drug Dose 73.9 ml/min 


  


  


 


 


Estimated GFR (


American) 92.4 


  


  


 


 


Estimated GFR (Non-


American 79.8 


  


  


 


 


BUN/Creatinine Ratio 17.6   


 


Random Glucose 104 mg/dl   


 


Calcium Level 9.2 mg/dl   


 


Magnesium Level 2.1 mg/dl   


 


Total Bilirubin 0.8 mg/dl   


 


Direct Bilirubin 0.2 mg/dl   


 


Aspartate Amino Transf


(AST/SGOT) 18 U/L 


  


  


 


 


Alanine Aminotransferase


(ALT/SGPT) 23 U/L 


  


  


 


 


Alkaline Phosphatase 68 U/L   


 


Total Creatine Kinase 73 U/L   


 


Creatine Kinase MB 0.7 ng/ml   


 


Creatine Kinase MB Ratio 1.0   


 


Troponin I < 0.015 ng/ml   


 


Total Protein 7.5 gm/dl   


 


Albumin 3.7 gm/dl   


 


Bedside Lactic Acid Venous  0.63 mmol/L  


 


Bedside Hemoglobin   14.3 g/dl 


 


Bedside Hematocrit   42 % 


 


Bedside Sodium   141 mEq/L 


 


Bedside Potassium   4.1 mEq/L 


 


Bedside Chloride   101 mEq/L 


 


Bedside Total CO2   27 mEq/l 


 


Bedside Blood Urea Nitrogen   18 mg/dl 


 


Bedside Creatinine   1.0 mg/dl 


 


Bedside Glucose (other)   112 mg/dl 


 


Bedside Ionized Calcium (Nathan)   1.14 mmol/l 











Assessment & Plan


Possible septic olecranon bursitis


RUE cellulitis





Patient is being admitted to the medical service. He has been started on 

empiric Vancomycin. He has some swelling on exam but no obvious wounds, abscess

, or fluid collection. He has some mild fluctuance at the olecranon but overall 

appears to have a more cellulitic presentation. Recommend 24-48 hours of IV 

antibiotics and access his clinical response. If no improvement, may consider 

MRI vs surgical intervention. Will discuss the plan with Dr. Palafox and have 

him evaluate the patient later this afternoon. Thank you for consultation.